# Patient Record
Sex: FEMALE | Race: WHITE | NOT HISPANIC OR LATINO | Employment: OTHER | URBAN - METROPOLITAN AREA
[De-identification: names, ages, dates, MRNs, and addresses within clinical notes are randomized per-mention and may not be internally consistent; named-entity substitution may affect disease eponyms.]

---

## 2017-05-02 RX ORDER — MEDROXYPROGESTERONE ACETATE 2.5 MG/1
TABLET ORAL
Qty: 90 TABLET | Refills: 1 | Status: SHIPPED | OUTPATIENT
Start: 2017-05-02 | End: 2018-05-30 | Stop reason: SDUPTHER

## 2017-10-23 ENCOUNTER — PROCEDURE VISIT (OUTPATIENT)
Dept: OBSTETRICS AND GYNECOLOGY | Facility: CLINIC | Age: 62
End: 2017-10-23

## 2017-10-23 ENCOUNTER — OFFICE VISIT (OUTPATIENT)
Dept: OBSTETRICS AND GYNECOLOGY | Facility: CLINIC | Age: 62
End: 2017-10-23

## 2017-10-23 VITALS
SYSTOLIC BLOOD PRESSURE: 160 MMHG | BODY MASS INDEX: 30.95 KG/M2 | DIASTOLIC BLOOD PRESSURE: 98 MMHG | HEIGHT: 62 IN | WEIGHT: 168.2 LBS

## 2017-10-23 DIAGNOSIS — Z78.0 POSTMENOPAUSAL: ICD-10-CM

## 2017-10-23 DIAGNOSIS — Z78.0 MENOPAUSE: ICD-10-CM

## 2017-10-23 DIAGNOSIS — Z01.419 ENCOUNTER FOR GYNECOLOGICAL EXAMINATION WITHOUT ABNORMAL FINDING: Primary | ICD-10-CM

## 2017-10-23 DIAGNOSIS — M85.80 OSTEOPENIA, UNSPECIFIED LOCATION: ICD-10-CM

## 2017-10-23 DIAGNOSIS — D25.2 SUBSEROUS LEIOMYOMA OF UTERUS: ICD-10-CM

## 2017-10-23 DIAGNOSIS — Z13.820 SCREENING FOR OSTEOPOROSIS: Primary | ICD-10-CM

## 2017-10-23 DIAGNOSIS — D25.1 INTRAMURAL LEIOMYOMA OF UTERUS: Primary | ICD-10-CM

## 2017-10-23 DIAGNOSIS — M85.88 OSTEOPENIA OF OTHER SITE: ICD-10-CM

## 2017-10-23 LAB
DEVELOPER EXPIRATION DATE: NORMAL
DEVELOPER LOT NUMBER: NORMAL
EXPIRATION DATE: NORMAL
FECAL OCCULT BLOOD SCREEN, POC: NEGATIVE
Lab: NORMAL
NEGATIVE CONTROL: NEGATIVE
POSITIVE CONTROL: POSITIVE

## 2017-10-23 PROCEDURE — G0328 FECAL BLOOD SCRN IMMUNOASSAY: HCPCS | Performed by: OBSTETRICS & GYNECOLOGY

## 2017-10-23 PROCEDURE — 99396 PREV VISIT EST AGE 40-64: CPT | Performed by: OBSTETRICS & GYNECOLOGY

## 2017-10-23 PROCEDURE — 77080 DXA BONE DENSITY AXIAL: CPT | Performed by: OBSTETRICS & GYNECOLOGY

## 2017-10-23 PROCEDURE — 76830 TRANSVAGINAL US NON-OB: CPT | Performed by: OBSTETRICS & GYNECOLOGY

## 2017-10-23 NOTE — PROGRESS NOTES
Subjective    Chief Complaint   Patient presents with   • Gynecologic Exam      History of Present Illness    Christiane Smith is a 62 y.o. female who presents for annual exam. Patient had mammogram today, DEXA scan today showed mild osteopenia, an ultrasound today showed same calcified 4.3 cm uterine fibroid as in 2008. Non-Smoker  , colonoscopy 6 years ago and due every 10 years.  Having no bleeding or problems.    Obstetric History:  OB History      Para Term  AB Living    1 1 1       SAB TAB Ectopic Multiple Live Births                 Menstrual History:     No LMP recorded.       Past Medical History:   Diagnosis Date   • GERD (gastroesophageal reflux disease)    • Hyperlipidemia    • Hypertension    • Migraine      Family History   Problem Relation Age of Onset   • Breast cancer Maternal Grandmother        The following portions of the patient's history were reviewed and updated as appropriate: allergies, current medications, past family history, past medical history, past social history, past surgical history and problem list.    Review of Systems   Constitutional: Negative.  Negative for fever and unexpected weight change.   HENT: Negative.    Respiratory: Negative for shortness of breath and wheezing.    Cardiovascular: Negative for chest pain, palpitations and leg swelling.   Gastrointestinal: Negative for abdominal pain, anal bleeding and blood in stool.   Genitourinary: Negative for dysuria, pelvic pain, urgency, vaginal bleeding, vaginal discharge and vaginal pain.   Skin: Negative.    Neurological: Negative.    Hematological: Negative.  Negative for adenopathy.   Psychiatric/Behavioral: Negative.  Negative for dysphoric mood. The patient is not nervous/anxious.             Objective   Physical Exam   Constitutional: She is oriented to person, place, and time. Vital signs are normal. She appears well-developed and well-nourished.   HENT:   Head: Normocephalic.   Neck: Trachea normal. No  "tracheal deviation present. No thyromegaly present.   Cardiovascular: Normal rate, regular rhythm and normal heart sounds.    No murmur heard.  Pulmonary/Chest: Effort normal and breath sounds normal.   Breasts without masses, tenderness or nipple discharge   Abdominal: Soft. Normal appearance. She exhibits no mass. There is no hepatosplenomegaly. There is no tenderness. No hernia.   Genitourinary: Rectum normal, vagina normal and uterus normal. Rectal exam shows guaiac negative stool. Uterus is not enlarged and not tender. Cervix exhibits no motion tenderness. Right adnexum displays no mass and no tenderness. Left adnexum displays no mass and no tenderness. No vaginal discharge found.   Genitourinary Comments: External genitalia normal    Lymphadenopathy:     She has no cervical adenopathy.     She has no axillary adenopathy.   Neurological: She is alert and oriented to person, place, and time.   Skin: Skin is warm and dry. No rash noted.   Psychiatric: She has a normal mood and affect. Her behavior is normal. Cognition and memory are normal.       /98  Ht 62\" (157.5 cm)  Wt 168 lb 3.2 oz (76.3 kg)  BMI 30.76 kg/m2    Assessment/Plan   Christiane was seen today for gynecologic exam.    Diagnoses and all orders for this visit:    Encounter for gynecological examination without abnormal finding  -     IGP,rfx Aptima HPV All Pth - ThinPrep Vial, Cervix  -     POC Occult Blood Stool    Menopause    Subserous leiomyoma of uterus    Osteopenia, unspecified location    Other orders  -      DEXA SCAN      Mammogram. RTO 1 year     Counseled about increasing calcium with vitamin D 2 twice a day.  Also will try to stop hormone replacement but call if she needs to go back on it.         "

## 2017-10-25 LAB
CONV .: NORMAL
CYTOLOGIST CVX/VAG CYTO: NORMAL
CYTOLOGY CVX/VAG DOC THIN PREP: NORMAL
DX ICD CODE: NORMAL
HIV 1 & 2 AB SER-IMP: NORMAL
OTHER STN SPEC: NORMAL
PATH REPORT.FINAL DX SPEC: NORMAL
STAT OF ADQ CVX/VAG CYTO-IMP: NORMAL

## 2017-11-09 ENCOUNTER — HOSPITAL ENCOUNTER (OUTPATIENT)
Dept: INFUSION THERAPY | Facility: HOSPITAL | Age: 62
Discharge: HOME | End: 2017-11-09
Admitting: PODIATRIST

## 2017-11-09 VITALS
BODY MASS INDEX: 30.91 KG/M2 | DIASTOLIC BLOOD PRESSURE: 91 MMHG | HEART RATE: 98 BPM | SYSTOLIC BLOOD PRESSURE: 158 MMHG | HEIGHT: 62 IN | OXYGEN SATURATION: 98 % | WEIGHT: 168 LBS | TEMPERATURE: 97.6 F | RESPIRATION RATE: 16 BRPM

## 2017-11-09 DIAGNOSIS — M72.2 PLANTAR FASCIITIS: Primary | ICD-10-CM

## 2017-11-09 PROCEDURE — 99195 PHLEBOTOMY: CPT

## 2017-11-09 NOTE — PROGRESS NOTES
NURSING PROGRESS NOTE: Patient ambulated to [facility] at 1000 for scheduled phlebotomy procedure.  History and medications reviewed with patient.  18g angio placed in RT. AC and 30ml blood removed.  3ml of Biomet PRP solution added to blood.  Patient tolerated procedure well.  Patient given blood sample collected to take with her to Dr. Rios's office.  Patient and her family left [facility] at 1025 to go to Dr. Rios's office.  No c/o.  OSBALDO Liang

## 2017-12-06 RX ORDER — ESTRADIOL 0.5 MG/1
TABLET ORAL
Qty: 90 TABLET | Refills: 2 | Status: SHIPPED | OUTPATIENT
Start: 2017-12-06 | End: 2021-06-09 | Stop reason: SDUPTHER

## 2018-06-04 RX ORDER — MEDROXYPROGESTERONE ACETATE 2.5 MG/1
TABLET ORAL
Qty: 90 TABLET | Refills: 2 | Status: SHIPPED | OUTPATIENT
Start: 2018-06-04 | End: 2019-03-11 | Stop reason: SDUPTHER

## 2018-08-01 ENCOUNTER — TRANSCRIBE ORDERS (OUTPATIENT)
Dept: ADMINISTRATIVE | Facility: HOSPITAL | Age: 63
End: 2018-08-01

## 2018-08-01 ENCOUNTER — LAB (OUTPATIENT)
Dept: LAB | Facility: HOSPITAL | Age: 63
End: 2018-08-01

## 2018-08-01 DIAGNOSIS — L13.9 BULLOUS SKIN DISEASE: Primary | ICD-10-CM

## 2018-08-01 DIAGNOSIS — L13.9 BULLOUS SKIN DISEASE: ICD-10-CM

## 2018-08-01 LAB — ERYTHROCYTE [SEDIMENTATION RATE] IN BLOOD: 6 MM/HR (ref 0–20)

## 2018-08-01 PROCEDURE — 86038 ANTINUCLEAR ANTIBODIES: CPT

## 2018-08-01 PROCEDURE — 86255 FLUORESCENT ANTIBODY SCREEN: CPT

## 2018-08-01 PROCEDURE — 85652 RBC SED RATE AUTOMATED: CPT

## 2018-08-01 PROCEDURE — 86256 FLUORESCENT ANTIBODY TITER: CPT

## 2018-08-01 PROCEDURE — 36415 COLL VENOUS BLD VENIPUNCTURE: CPT

## 2018-08-02 LAB — ANA SER QL IA: NEGATIVE

## 2018-08-18 LAB
BMZ BP230 AB SERPL-ACNC: 1.2 U/ML
Lab: <1 U/ML

## 2018-10-08 DIAGNOSIS — Z12.31 VISIT FOR SCREENING MAMMOGRAM: Primary | ICD-10-CM

## 2018-10-18 ENCOUNTER — OFFICE VISIT (OUTPATIENT)
Dept: OBSTETRICS AND GYNECOLOGY | Facility: CLINIC | Age: 63
End: 2018-10-18

## 2018-10-18 VITALS
WEIGHT: 168 LBS | HEIGHT: 64 IN | SYSTOLIC BLOOD PRESSURE: 126 MMHG | BODY MASS INDEX: 28.68 KG/M2 | DIASTOLIC BLOOD PRESSURE: 64 MMHG

## 2018-10-18 DIAGNOSIS — Z78.0 MENOPAUSE: ICD-10-CM

## 2018-10-18 DIAGNOSIS — M85.80 OSTEOPENIA, UNSPECIFIED LOCATION: ICD-10-CM

## 2018-10-18 DIAGNOSIS — Z01.419 ENCOUNTER FOR GYNECOLOGICAL EXAMINATION WITHOUT ABNORMAL FINDING: Primary | ICD-10-CM

## 2018-10-18 PROCEDURE — 99397 PER PM REEVAL EST PAT 65+ YR: CPT | Performed by: OBSTETRICS & GYNECOLOGY

## 2018-10-18 PROCEDURE — G0328 FECAL BLOOD SCRN IMMUNOASSAY: HCPCS | Performed by: OBSTETRICS & GYNECOLOGY

## 2018-10-18 NOTE — PROGRESS NOTES
Subjective    Chief Complaint   Patient presents with   • Gynecologic Exam     AE, PM NO BLEEDING, NO PROBLEMS + HRT'S      History of Present Illness    Christiane Smith is a 63 y.o. female who presents for annual exam.  Nonsmoker.  DEXA scan 2017 mild osteopenia.  Colonoscopy due in 3 years.  Postmenopausal on hormone replacement with estradiol 0.5 mg and Provera 2.5 mg daily.  Patient will try to wean off hormones this month but if not able to will call.  Mammogram scheduled next week.    Obstetric History:  OB History      Para Term  AB Living    1 1 1          SAB TAB Ectopic Molar Multiple Live Births                        Menstrual History:     No LMP recorded.       Past Medical History:   Diagnosis Date   • GERD (gastroesophageal reflux disease)    • Hyperlipidemia    • Hypertension    • Migraine    • Plantar fasciitis      Family History   Problem Relation Age of Onset   • Breast cancer Maternal Grandmother      History   Smoking Status   • Never Smoker   Smokeless Tobacco   • Never Used     Counseling given: Not Answered      The following portions of the patient's history were reviewed and updated as appropriate: allergies, current medications, past family history, past medical history, past social history, past surgical history and problem list.    Review of Systems   Constitutional: Negative.  Negative for fever and unexpected weight change.   HENT: Negative.    Respiratory: Negative for shortness of breath and wheezing.    Cardiovascular: Negative for chest pain, palpitations and leg swelling.   Gastrointestinal: Negative for abdominal pain, anal bleeding and blood in stool.   Genitourinary: Negative for dysuria, pelvic pain, urgency, vaginal bleeding, vaginal discharge and vaginal pain.   Skin: Negative.    Neurological: Negative.    Hematological: Negative.  Negative for adenopathy.   Psychiatric/Behavioral: Negative.  Negative for dysphoric mood. The patient is not  "nervous/anxious.             Objective   Physical Exam   Constitutional: She is oriented to person, place, and time. Vital signs are normal. She appears well-developed and well-nourished.   HENT:   Head: Normocephalic.   Neck: Trachea normal. No tracheal deviation present. No thyromegaly present.   Cardiovascular: Normal rate, regular rhythm and normal heart sounds.    No murmur heard.  Pulmonary/Chest: Effort normal and breath sounds normal.   Breasts without masses, tenderness or nipple discharge   Abdominal: Soft. Normal appearance. She exhibits no mass. There is no hepatosplenomegaly. There is no tenderness. No hernia.   Genitourinary: Rectum normal, vagina normal and uterus normal. Rectal exam shows guaiac negative stool. Uterus is not enlarged and not tender. Cervix exhibits no motion tenderness. Right adnexum displays no mass and no tenderness. Left adnexum displays no mass and no tenderness. No vaginal discharge found.   Genitourinary Comments: External genitalia normal    Lymphadenopathy:     She has no cervical adenopathy.     She has no axillary adenopathy.   Neurological: She is alert and oriented to person, place, and time.   Skin: Skin is warm and dry. No rash noted.   Psychiatric: She has a normal mood and affect. Her behavior is normal. Cognition and memory are normal.       /64   Ht 162.6 cm (64\")   Wt 76.2 kg (168 lb)   BMI 28.84 kg/m²     Assessment/Plan   Christiane was seen today for gynecologic exam.    Diagnoses and all orders for this visit:    Encounter for gynecological examination without abnormal finding  -     IGP,rfx Aptima HPV All Pth  -     POC Occult Blood Stool    Osteopenia, unspecified location    Menopause        Mammogram. RTO 1 year     Counseled about continuing calcium with vitamin D twice daily.  Also discussed weaning off hormone replacement.         "

## 2018-10-23 ENCOUNTER — OFFICE VISIT (OUTPATIENT)
Dept: INFECTIOUS DISEASES | Facility: CLINIC | Age: 63
End: 2018-10-23

## 2018-10-23 ENCOUNTER — APPOINTMENT (OUTPATIENT)
Dept: WOMENS IMAGING | Facility: HOSPITAL | Age: 63
End: 2018-10-23

## 2018-10-23 VITALS
SYSTOLIC BLOOD PRESSURE: 160 MMHG | HEART RATE: 85 BPM | HEIGHT: 64 IN | BODY MASS INDEX: 28.65 KG/M2 | TEMPERATURE: 97.9 F | DIASTOLIC BLOOD PRESSURE: 84 MMHG | RESPIRATION RATE: 12 BRPM | WEIGHT: 167.8 LBS

## 2018-10-23 DIAGNOSIS — L13.8 PSEUDOPORPHYRIA: Primary | ICD-10-CM

## 2018-10-23 PROCEDURE — 77063 BREAST TOMOSYNTHESIS BI: CPT | Performed by: RADIOLOGY

## 2018-10-23 PROCEDURE — 77067 SCR MAMMO BI INCL CAD: CPT | Performed by: RADIOLOGY

## 2018-10-23 PROCEDURE — 99204 OFFICE O/P NEW MOD 45 MIN: CPT | Performed by: INTERNAL MEDICINE

## 2018-10-23 NOTE — PROGRESS NOTES
"cc: This a very nice 63-year-old we are asked to provide evaluation opinion regarding blisters.    Patient reports that she is her usual state of health until November 2017 which developed a single skin blister.  That healed okay but she's had recurrent blisters on her toes since summer of 2018.  She reports that they tend to occur on multiple toes without known inciting events and drain watery serous fluid.  There is also spontaneously after several weeks.  She's had biopsy ×2 from dermatology which she reported to me are negative.  I did obtain those records and review them: Bullous pemphigoid antibodies were negative as well as AMMON.  Sedimentation rate was normal.  She was seen by dermatology and diagnosed with pseudo-porphyria.  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  I'm missing some of the immunotherapy dermatology report that looks like she had negative IgG and IgA basement membrane zone antibodies.  In any event, it was theorized by dermatology that she may have had this blistering reaction as a consequence of NSAID use specifically with oxaprozin.    Patient reports she's been on oxaprozin for about one year.  She continues to take it for arthritis of the back.    She has had a couple episodes of infection in the toe one on the right great toe and the right third toe.  Both are quiescent.    Past medical history: Hypertension, hyperlipidemia, seasonal allergies, degenerative disc disease  No known drug allergies  No family history of infectious diseases  Social history: .  Retired.  Nonsmoker.    Review of Systems: All other reviewed and negative except as per HPI    Blood pressure 160/84, pulse 85, temperature 97.9 °F (36.6 °C), resp. rate 12, height 162.6 cm (64\"), weight 76.1 kg (167 lb 12.8 oz).  GENERAL: " Awake and alert, in no acute distress.   HEENT: Oropharynx is clear. Hearing is grossly normal.   EYES: PERRL. No conjunctival injection. No lid lag.   LYMPHATICS: No lymphadenopathy of the neck or inguinal regions.   HEART: Regular rate and rhythm. No peripheral edema.   LUNGS: Clear to auscultation anteriorly with normal respiratory effort.   ABDOMEN: Soft, nontender, nondistended. No appreciable organomegaly.   SKIN: Warm and dry without cutaneous eruptions   PSYCHIATRIC: Appropriate mood, affect, insight, and judgment.       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  It was theorized this was secondary to oxaprozin use.  I told her that she did not have a primary infectious explanation for the blistering disease.  She's had superinfection of the toes due to the underlying primary dermatologic process.  It may be reasonable to discontinue the oxaprozin in favor of an alternative agent.  I said I would defer this to her primary care providers.  No active infection and treatment of the underlying disease should help prevent continued infections.  She is going to follow back up with dermatology and reach back out and to see if they have additional recommendations.  We will be happy to reevaluate anytime she needs.

## 2018-11-06 DIAGNOSIS — Z12.31 VISIT FOR SCREENING MAMMOGRAM: ICD-10-CM

## 2019-03-12 RX ORDER — MEDROXYPROGESTERONE ACETATE 2.5 MG/1
TABLET ORAL
Qty: 90 TABLET | Refills: 2 | Status: SHIPPED | OUTPATIENT
Start: 2019-03-12 | End: 2020-03-04

## 2019-05-08 ENCOUNTER — APPOINTMENT (OUTPATIENT)
Dept: CARDIOLOGY | Facility: HOSPITAL | Age: 64
End: 2019-05-08

## 2019-05-08 ENCOUNTER — APPOINTMENT (OUTPATIENT)
Dept: GENERAL RADIOLOGY | Facility: HOSPITAL | Age: 64
End: 2019-05-08

## 2019-05-08 ENCOUNTER — HOSPITAL ENCOUNTER (OUTPATIENT)
Facility: HOSPITAL | Age: 64
Setting detail: OBSERVATION
Discharge: HOME | End: 2019-05-10
Attending: EMERGENCY MEDICINE | Admitting: INTERNAL MEDICINE

## 2019-05-08 DIAGNOSIS — R07.9 CHEST PAIN, UNSPECIFIED TYPE: Primary | ICD-10-CM

## 2019-05-08 LAB
ALBUMIN SERPL-MCNC: 4.5 G/DL (ref 3.5–5.2)
ALBUMIN/GLOB SERPL: 1.6 G/DL
ALP SERPL-CCNC: 61 U/L (ref 39–117)
ALT SERPL W P-5'-P-CCNC: 18 U/L (ref 1–33)
ANION GAP SERPL CALCULATED.3IONS-SCNC: 16.9 MMOL/L
AST SERPL-CCNC: 19 U/L (ref 1–32)
BASOPHILS # BLD AUTO: 0.05 10*3/MM3 (ref 0–0.2)
BASOPHILS NFR BLD AUTO: 0.5 % (ref 0–1.5)
BH CV ECHO MEAS - ACS: 1.8 CM
BH CV ECHO MEAS - AO MAX PG (FULL): 0.84 MMHG
BH CV ECHO MEAS - AO MAX PG: 7.4 MMHG
BH CV ECHO MEAS - AO MEAN PG (FULL): 1 MMHG
BH CV ECHO MEAS - AO MEAN PG: 4 MMHG
BH CV ECHO MEAS - AO ROOT AREA (BSA CORRECTED): 1.5
BH CV ECHO MEAS - AO ROOT AREA: 6.2 CM^2
BH CV ECHO MEAS - AO ROOT DIAM: 2.8 CM
BH CV ECHO MEAS - AO V2 MAX: 136 CM/SEC
BH CV ECHO MEAS - AO V2 MEAN: 89 CM/SEC
BH CV ECHO MEAS - AO V2 VTI: 24.3 CM
BH CV ECHO MEAS - AVA(I,A): 3.2 CM^2
BH CV ECHO MEAS - AVA(I,D): 3.2 CM^2
BH CV ECHO MEAS - AVA(V,A): 3.3 CM^2
BH CV ECHO MEAS - AVA(V,D): 3.3 CM^2
BH CV ECHO MEAS - BSA(HAYCOCK): 1.9 M^2
BH CV ECHO MEAS - BSA: 1.8 M^2
BH CV ECHO MEAS - BZI_BMI: 29.4 KILOGRAMS/M^2
BH CV ECHO MEAS - BZI_METRIC_HEIGHT: 162.6 CM
BH CV ECHO MEAS - BZI_METRIC_WEIGHT: 77.6 KG
BH CV ECHO MEAS - EDV(CUBED): 54.4 ML
BH CV ECHO MEAS - EDV(MOD-SP2): 45.1 ML
BH CV ECHO MEAS - EDV(MOD-SP4): 64.7 ML
BH CV ECHO MEAS - EDV(TEICH): 61.6 ML
BH CV ECHO MEAS - EF(CUBED): 65.4 %
BH CV ECHO MEAS - EF(MOD-BP): 60 %
BH CV ECHO MEAS - EF(MOD-SP2): 60.1 %
BH CV ECHO MEAS - EF(MOD-SP4): 58.3 %
BH CV ECHO MEAS - EF(TEICH): 57.7 %
BH CV ECHO MEAS - ESV(CUBED): 18.8 ML
BH CV ECHO MEAS - ESV(MOD-SP2): 18 ML
BH CV ECHO MEAS - ESV(MOD-SP4): 27 ML
BH CV ECHO MEAS - ESV(TEICH): 26 ML
BH CV ECHO MEAS - FS: 29.8 %
BH CV ECHO MEAS - IVS/LVPW: 0.91
BH CV ECHO MEAS - IVSD: 1 CM
BH CV ECHO MEAS - LA DIMENSION: 3.2 CM
BH CV ECHO MEAS - LA/AO: 1.1
BH CV ECHO MEAS - LAT PEAK E' VEL: 7 CM/SEC
BH CV ECHO MEAS - LV DIASTOLIC VOL/BSA (35-75): 35.4 ML/M^2
BH CV ECHO MEAS - LV MASS(C)D: 125 GRAMS
BH CV ECHO MEAS - LV MASS(C)DI: 68.3 GRAMS/M^2
BH CV ECHO MEAS - LV MAX PG: 6.6 MMHG
BH CV ECHO MEAS - LV MEAN PG: 3 MMHG
BH CV ECHO MEAS - LV SYSTOLIC VOL/BSA (12-30): 14.8 ML/M^2
BH CV ECHO MEAS - LV V1 MAX: 128 CM/SEC
BH CV ECHO MEAS - LV V1 MEAN: 79.4 CM/SEC
BH CV ECHO MEAS - LV V1 VTI: 22.3 CM
BH CV ECHO MEAS - LVIDD: 3.8 CM
BH CV ECHO MEAS - LVIDS: 2.7 CM
BH CV ECHO MEAS - LVLD AP2: 5.9 CM
BH CV ECHO MEAS - LVLD AP4: 6.6 CM
BH CV ECHO MEAS - LVLS AP2: 4.3 CM
BH CV ECHO MEAS - LVLS AP4: 6 CM
BH CV ECHO MEAS - LVOT AREA (M): 3.5 CM^2
BH CV ECHO MEAS - LVOT AREA: 3.5 CM^2
BH CV ECHO MEAS - LVOT DIAM: 2.1 CM
BH CV ECHO MEAS - LVPWD: 1.1 CM
BH CV ECHO MEAS - MED PEAK E' VEL: 9 CM/SEC
BH CV ECHO MEAS - MV A DUR: 0.11 SEC
BH CV ECHO MEAS - MV A MAX VEL: 98 CM/SEC
BH CV ECHO MEAS - MV DEC SLOPE: 654 CM/SEC^2
BH CV ECHO MEAS - MV DEC TIME: 0.11 SEC
BH CV ECHO MEAS - MV E MAX VEL: 80 CM/SEC
BH CV ECHO MEAS - MV E/A: 0.82
BH CV ECHO MEAS - MV MAX PG: 5.6 MMHG
BH CV ECHO MEAS - MV MEAN PG: 3 MMHG
BH CV ECHO MEAS - MV P1/2T MAX VEL: 97.7 CM/SEC
BH CV ECHO MEAS - MV P1/2T: 43.8 MSEC
BH CV ECHO MEAS - MV V2 MAX: 118 CM/SEC
BH CV ECHO MEAS - MV V2 MEAN: 76.6 CM/SEC
BH CV ECHO MEAS - MV V2 VTI: 21.8 CM
BH CV ECHO MEAS - MVA P1/2T LCG: 2.3 CM^2
BH CV ECHO MEAS - MVA(P1/2T): 5 CM^2
BH CV ECHO MEAS - MVA(VTI): 3.5 CM^2
BH CV ECHO MEAS - PA ACC TIME: 0.07 SEC
BH CV ECHO MEAS - PA MAX PG (FULL): 1.5 MMHG
BH CV ECHO MEAS - PA MAX PG: 4.2 MMHG
BH CV ECHO MEAS - PA PR(ACCEL): 47.5 MMHG
BH CV ECHO MEAS - PA V2 MAX: 102 CM/SEC
BH CV ECHO MEAS - PULM A REVS DUR: 0.12 SEC
BH CV ECHO MEAS - PULM A REVS VEL: 59.7 CM/SEC
BH CV ECHO MEAS - PULM DIAS VEL: 39.8 CM/SEC
BH CV ECHO MEAS - PULM S/D: 1.5
BH CV ECHO MEAS - PULM SYS VEL: 61.6 CM/SEC
BH CV ECHO MEAS - PVA(V,A): 2.5 CM^2
BH CV ECHO MEAS - PVA(V,D): 2.5 CM^2
BH CV ECHO MEAS - QP/QS: 0.5
BH CV ECHO MEAS - RV MAX PG: 2.7 MMHG
BH CV ECHO MEAS - RV MEAN PG: 1 MMHG
BH CV ECHO MEAS - RV V1 MAX: 82 CM/SEC
BH CV ECHO MEAS - RV V1 MEAN: 46.5 CM/SEC
BH CV ECHO MEAS - RV V1 VTI: 12.2 CM
BH CV ECHO MEAS - RVOT AREA: 3.1 CM^2
BH CV ECHO MEAS - RVOT DIAM: 2 CM
BH CV ECHO MEAS - SI(AO): 81.8 ML/M^2
BH CV ECHO MEAS - SI(CUBED): 19.5 ML/M^2
BH CV ECHO MEAS - SI(LVOT): 42.2 ML/M^2
BH CV ECHO MEAS - SI(MOD-SP2): 14.8 ML/M^2
BH CV ECHO MEAS - SI(MOD-SP4): 20.6 ML/M^2
BH CV ECHO MEAS - SI(TEICH): 19.4 ML/M^2
BH CV ECHO MEAS - SV(AO): 149.6 ML
BH CV ECHO MEAS - SV(CUBED): 35.6 ML
BH CV ECHO MEAS - SV(LVOT): 77.2 ML
BH CV ECHO MEAS - SV(MOD-SP2): 27.1 ML
BH CV ECHO MEAS - SV(MOD-SP4): 37.7 ML
BH CV ECHO MEAS - SV(RVOT): 38.3 ML
BH CV ECHO MEAS - SV(TEICH): 35.5 ML
BH CV ECHO MEAS - TAPSE (>1.6): 1.9 CM2
BH CV ECHO MEASUREMENTS AVERAGE E/E' RATIO: 10
BH CV STRESS BP STAGE 1: NORMAL
BH CV STRESS BP STAGE 2: NORMAL
BH CV STRESS DURATION MIN STAGE 1: 3
BH CV STRESS DURATION SEC STAGE 1: 0
BH CV STRESS DURATION SEC STAGE 2: 21
BH CV STRESS GRADE STAGE 1: 10
BH CV STRESS GRADE STAGE 2: 12
BH CV STRESS HR STAGE 1: 133
BH CV STRESS HR STAGE 2: 140
BH CV STRESS METS STAGE 1: 5
BH CV STRESS METS STAGE 2: 7.5
BH CV STRESS PROTOCOL 1: NORMAL
BH CV STRESS RECOVERY BP: NORMAL MMHG
BH CV STRESS RECOVERY HR: 119 BPM
BH CV STRESS SPEED STAGE 1: 1.7
BH CV STRESS SPEED STAGE 2: 2.5
BH CV STRESS STAGE 1: 1
BH CV STRESS STAGE 2: 2
BH CV VAS BP RIGHT ARM: NORMAL MMHG
BH CV XLRA - RV BASE: 2.3 CM
BH CV XLRA - TDI S': 14 CM/SEC
BILIRUB SERPL-MCNC: 0.6 MG/DL (ref 0.2–1.2)
BUN BLD-MCNC: 31 MG/DL (ref 8–23)
BUN/CREAT SERPL: 41.3 (ref 7–25)
CALCIUM SPEC-SCNC: 9.5 MG/DL (ref 8.6–10.5)
CHLORIDE SERPL-SCNC: 102 MMOL/L (ref 98–107)
CO2 SERPL-SCNC: 23.1 MMOL/L (ref 22–29)
CREAT BLD-MCNC: 0.75 MG/DL (ref 0.57–1)
D DIMER PPP FEU-MCNC: 0.28 MCGFEU/ML (ref 0–0.46)
DEPRECATED RDW RBC AUTO: 44.2 FL (ref 37–54)
EOSINOPHIL # BLD AUTO: 0.17 10*3/MM3 (ref 0–0.4)
EOSINOPHIL NFR BLD AUTO: 1.6 % (ref 0.3–6.2)
ERYTHROCYTE [DISTWIDTH] IN BLOOD BY AUTOMATED COUNT: 13.1 % (ref 12.3–15.4)
GFR SERPL CREATININE-BSD FRML MDRD: 78 ML/MIN/1.73
GLOBULIN UR ELPH-MCNC: 2.9 GM/DL
GLUCOSE BLD-MCNC: 96 MG/DL (ref 65–99)
HBA1C MFR BLD: 5 % (ref 4.8–5.6)
HCT VFR BLD AUTO: 46.3 % (ref 34–46.6)
HGB BLD-MCNC: 15.4 G/DL (ref 12–15.9)
HOLD SPECIMEN: NORMAL
HOLD SPECIMEN: NORMAL
IMM GRANULOCYTES # BLD AUTO: 0.03 10*3/MM3 (ref 0–0.05)
IMM GRANULOCYTES NFR BLD AUTO: 0.3 % (ref 0–0.5)
LEFT ATRIUM VOLUME INDEX: 16 ML/M2
LYMPHOCYTES # BLD AUTO: 0.63 10*3/MM3 (ref 0.7–3.1)
LYMPHOCYTES NFR BLD AUTO: 6 % (ref 19.6–45.3)
MAXIMAL PREDICTED HEART RATE: 155 BPM
MAXIMAL PREDICTED HEART RATE: 155 BPM
MCH RBC QN AUTO: 30.4 PG (ref 26.6–33)
MCHC RBC AUTO-ENTMCNC: 33.3 G/DL (ref 31.5–35.7)
MCV RBC AUTO: 91.5 FL (ref 79–97)
MONOCYTES # BLD AUTO: 0.57 10*3/MM3 (ref 0.1–0.9)
MONOCYTES NFR BLD AUTO: 5.4 % (ref 5–12)
NEUTROPHILS # BLD AUTO: 9.13 10*3/MM3 (ref 1.7–7)
NEUTROPHILS NFR BLD AUTO: 86.2 % (ref 42.7–76)
NRBC BLD AUTO-RTO: 0 /100 WBC (ref 0–0.2)
PERCENT MAX PREDICTED HR: 90.97 %
PLATELET # BLD AUTO: 213 10*3/MM3 (ref 140–450)
PMV BLD AUTO: 10.1 FL (ref 6–12)
POTASSIUM BLD-SCNC: 3.7 MMOL/L (ref 3.5–5.2)
PROT SERPL-MCNC: 7.4 G/DL (ref 6–8.5)
RBC # BLD AUTO: 5.06 10*6/MM3 (ref 3.77–5.28)
SODIUM BLD-SCNC: 142 MMOL/L (ref 136–145)
STRESS BASELINE BP: NORMAL MMHG
STRESS BASELINE HR: 109 BPM
STRESS O2 SAT REST: 99 %
STRESS PERCENT HR: 107 %
STRESS POST ESTIMATED WORKLOAD: 5.1 METS
STRESS POST EXERCISE DUR MIN: 3 MIN
STRESS POST EXERCISE DUR SEC: 21 SEC
STRESS POST O2 SAT PEAK: 99 %
STRESS POST PEAK BP: NORMAL MMHG
STRESS POST PEAK HR: 141 BPM
STRESS TARGET HR: 132 BPM
STRESS TARGET HR: 132 BPM
TROPONIN T SERPL-MCNC: <0.01 NG/ML (ref 0–0.03)
TSH SERPL DL<=0.05 MIU/L-ACNC: 1.49 MIU/ML (ref 0.27–4.2)
WBC NRBC COR # BLD: 10.58 10*3/MM3 (ref 3.4–10.8)
WHOLE BLOOD HOLD SPECIMEN: NORMAL
WHOLE BLOOD HOLD SPECIMEN: NORMAL

## 2019-05-08 PROCEDURE — 83735 ASSAY OF MAGNESIUM: CPT | Performed by: INTERNAL MEDICINE

## 2019-05-08 PROCEDURE — 84484 ASSAY OF TROPONIN QUANT: CPT | Performed by: INTERNAL MEDICINE

## 2019-05-08 PROCEDURE — 96361 HYDRATE IV INFUSION ADD-ON: CPT

## 2019-05-08 PROCEDURE — 99204 OFFICE O/P NEW MOD 45 MIN: CPT | Performed by: NURSE PRACTITIONER

## 2019-05-08 PROCEDURE — G0378 HOSPITAL OBSERVATION PER HR: HCPCS

## 2019-05-08 PROCEDURE — 99285 EMERGENCY DEPT VISIT HI MDM: CPT

## 2019-05-08 PROCEDURE — 25010000002 ONDANSETRON PER 1 MG: Performed by: EMERGENCY MEDICINE

## 2019-05-08 PROCEDURE — 96372 THER/PROPH/DIAG INJ SC/IM: CPT

## 2019-05-08 PROCEDURE — 96374 THER/PROPH/DIAG INJ IV PUSH: CPT

## 2019-05-08 PROCEDURE — 93306 TTE W/DOPPLER COMPLETE: CPT

## 2019-05-08 PROCEDURE — 93010 ELECTROCARDIOGRAM REPORT: CPT | Performed by: INTERNAL MEDICINE

## 2019-05-08 PROCEDURE — 25010000002 ENOXAPARIN PER 10 MG: Performed by: INTERNAL MEDICINE

## 2019-05-08 PROCEDURE — 93005 ELECTROCARDIOGRAM TRACING: CPT | Performed by: EMERGENCY MEDICINE

## 2019-05-08 PROCEDURE — 93306 TTE W/DOPPLER COMPLETE: CPT | Performed by: INTERNAL MEDICINE

## 2019-05-08 PROCEDURE — 93017 CV STRESS TEST TRACING ONLY: CPT

## 2019-05-08 PROCEDURE — 93005 ELECTROCARDIOGRAM TRACING: CPT

## 2019-05-08 PROCEDURE — 85025 COMPLETE CBC W/AUTO DIFF WBC: CPT | Performed by: EMERGENCY MEDICINE

## 2019-05-08 PROCEDURE — 93016 CV STRESS TEST SUPVJ ONLY: CPT | Performed by: INTERNAL MEDICINE

## 2019-05-08 PROCEDURE — 83036 HEMOGLOBIN GLYCOSYLATED A1C: CPT | Performed by: INTERNAL MEDICINE

## 2019-05-08 PROCEDURE — 80053 COMPREHEN METABOLIC PANEL: CPT | Performed by: EMERGENCY MEDICINE

## 2019-05-08 PROCEDURE — 71046 X-RAY EXAM CHEST 2 VIEWS: CPT

## 2019-05-08 PROCEDURE — 99220 PR INITIAL OBSERVATION CARE/DAY 70 MINUTES: CPT | Performed by: INTERNAL MEDICINE

## 2019-05-08 PROCEDURE — 93018 CV STRESS TEST I&R ONLY: CPT | Performed by: INTERNAL MEDICINE

## 2019-05-08 PROCEDURE — 85379 FIBRIN DEGRADATION QUANT: CPT | Performed by: EMERGENCY MEDICINE

## 2019-05-08 PROCEDURE — 84443 ASSAY THYROID STIM HORMONE: CPT | Performed by: INTERNAL MEDICINE

## 2019-05-08 PROCEDURE — 93005 ELECTROCARDIOGRAM TRACING: CPT | Performed by: INTERNAL MEDICINE

## 2019-05-08 PROCEDURE — 99284 EMERGENCY DEPT VISIT MOD MDM: CPT | Performed by: EMERGENCY MEDICINE

## 2019-05-08 PROCEDURE — 84484 ASSAY OF TROPONIN QUANT: CPT | Performed by: EMERGENCY MEDICINE

## 2019-05-08 RX ORDER — ACETAMINOPHEN 325 MG/1
650 TABLET ORAL EVERY 4 HOURS PRN
Status: DISCONTINUED | OUTPATIENT
Start: 2019-05-08 | End: 2019-05-10 | Stop reason: HOSPADM

## 2019-05-08 RX ORDER — SODIUM CHLORIDE 0.9 % (FLUSH) 0.9 %
3 SYRINGE (ML) INJECTION EVERY 12 HOURS SCHEDULED
Status: DISCONTINUED | OUTPATIENT
Start: 2019-05-08 | End: 2019-05-10 | Stop reason: HOSPADM

## 2019-05-08 RX ORDER — SODIUM CHLORIDE 0.9 % (FLUSH) 0.9 %
3-10 SYRINGE (ML) INJECTION AS NEEDED
Status: DISCONTINUED | OUTPATIENT
Start: 2019-05-08 | End: 2019-05-10 | Stop reason: HOSPADM

## 2019-05-08 RX ORDER — ONDANSETRON 2 MG/ML
4 INJECTION INTRAMUSCULAR; INTRAVENOUS EVERY 6 HOURS PRN
Status: DISCONTINUED | OUTPATIENT
Start: 2019-05-08 | End: 2019-05-10 | Stop reason: HOSPADM

## 2019-05-08 RX ORDER — SODIUM CHLORIDE 0.9 % (FLUSH) 0.9 %
10 SYRINGE (ML) INJECTION AS NEEDED
Status: DISCONTINUED | OUTPATIENT
Start: 2019-05-08 | End: 2019-05-10 | Stop reason: HOSPADM

## 2019-05-08 RX ORDER — SODIUM CHLORIDE 9 MG/ML
150 INJECTION, SOLUTION INTRAVENOUS CONTINUOUS
Status: DISCONTINUED | OUTPATIENT
Start: 2019-05-09 | End: 2019-05-10 | Stop reason: HOSPADM

## 2019-05-08 RX ORDER — ONDANSETRON 4 MG/1
4 TABLET, FILM COATED ORAL EVERY 6 HOURS PRN
Status: DISCONTINUED | OUTPATIENT
Start: 2019-05-08 | End: 2019-05-10 | Stop reason: HOSPADM

## 2019-05-08 RX ORDER — SUMATRIPTAN 25 MG/1
50 TABLET, FILM COATED ORAL
Status: DISCONTINUED | OUTPATIENT
Start: 2019-05-08 | End: 2019-05-08

## 2019-05-08 RX ORDER — NITROGLYCERIN 0.4 MG/1
0.4 TABLET SUBLINGUAL
Status: DISCONTINUED | OUTPATIENT
Start: 2019-05-08 | End: 2019-05-10 | Stop reason: HOSPADM

## 2019-05-08 RX ORDER — ONDANSETRON 2 MG/ML
4 INJECTION INTRAMUSCULAR; INTRAVENOUS ONCE
Status: COMPLETED | OUTPATIENT
Start: 2019-05-08 | End: 2019-05-08

## 2019-05-08 RX ORDER — ATORVASTATIN CALCIUM 40 MG/1
40 TABLET, FILM COATED ORAL DAILY
Status: DISCONTINUED | OUTPATIENT
Start: 2019-05-08 | End: 2019-05-10 | Stop reason: HOSPADM

## 2019-05-08 RX ORDER — MULTIPLE VITAMINS W/ MINERALS TAB 9MG-400MCG
1 TAB ORAL DAILY
COMMUNITY
End: 2022-05-31

## 2019-05-08 RX ORDER — SODIUM CHLORIDE 9 MG/ML
75 INJECTION, SOLUTION INTRAVENOUS CONTINUOUS
Status: DISCONTINUED | OUTPATIENT
Start: 2019-05-08 | End: 2019-05-08

## 2019-05-08 RX ORDER — ONDANSETRON 2 MG/ML
INJECTION INTRAMUSCULAR; INTRAVENOUS
Status: DISPENSED
Start: 2019-05-08 | End: 2019-05-08

## 2019-05-08 RX ORDER — CHOLECALCIFEROL (VITAMIN D3) 125 MCG
5 CAPSULE ORAL NIGHTLY PRN
Status: DISCONTINUED | OUTPATIENT
Start: 2019-05-08 | End: 2019-05-10 | Stop reason: HOSPADM

## 2019-05-08 RX ORDER — SODIUM CHLORIDE 9 MG/ML
40 INJECTION, SOLUTION INTRAVENOUS AS NEEDED
Status: DISCONTINUED | OUTPATIENT
Start: 2019-05-08 | End: 2019-05-10 | Stop reason: HOSPADM

## 2019-05-08 RX ORDER — ASPIRIN 81 MG/1
81 TABLET ORAL DAILY
Status: DISCONTINUED | OUTPATIENT
Start: 2019-05-09 | End: 2019-05-10 | Stop reason: HOSPADM

## 2019-05-08 RX ORDER — PANTOPRAZOLE SODIUM 40 MG/1
40 TABLET, DELAYED RELEASE ORAL EVERY MORNING
Status: DISCONTINUED | OUTPATIENT
Start: 2019-05-09 | End: 2019-05-10 | Stop reason: HOSPADM

## 2019-05-08 RX ORDER — SODIUM CHLORIDE 9 MG/ML
125 INJECTION, SOLUTION INTRAVENOUS CONTINUOUS
Status: DISCONTINUED | OUTPATIENT
Start: 2019-05-08 | End: 2019-05-08

## 2019-05-08 RX ORDER — SENNA AND DOCUSATE SODIUM 50; 8.6 MG/1; MG/1
2 TABLET, FILM COATED ORAL NIGHTLY PRN
Status: DISCONTINUED | OUTPATIENT
Start: 2019-05-08 | End: 2019-05-10 | Stop reason: HOSPADM

## 2019-05-08 RX ORDER — ASPIRIN 325 MG
325 TABLET ORAL ONCE
Status: COMPLETED | OUTPATIENT
Start: 2019-05-08 | End: 2019-05-08

## 2019-05-08 RX ADMIN — METOPROLOL TARTRATE 12.5 MG: 25 TABLET, FILM COATED ORAL at 21:00

## 2019-05-08 RX ADMIN — SODIUM CHLORIDE 500 ML: 900 INJECTION, SOLUTION INTRAVENOUS at 09:10

## 2019-05-08 RX ADMIN — ENOXAPARIN SODIUM 40 MG: 40 INJECTION SUBCUTANEOUS at 18:27

## 2019-05-08 RX ADMIN — ONDANSETRON 4 MG: 2 INJECTION, SOLUTION INTRAMUSCULAR; INTRAVENOUS at 09:03

## 2019-05-08 RX ADMIN — NITROGLYCERIN 0.4 MG: 0.4 TABLET SUBLINGUAL at 08:45

## 2019-05-08 RX ADMIN — ATORVASTATIN CALCIUM 40 MG: 40 TABLET, FILM COATED ORAL at 18:25

## 2019-05-08 RX ADMIN — ASPIRIN 325 MG: 325 TABLET, COATED ORAL at 08:45

## 2019-05-08 RX ADMIN — SODIUM CHLORIDE, PRESERVATIVE FREE 3 ML: 5 INJECTION INTRAVENOUS at 21:01

## 2019-05-08 RX ADMIN — ACETAMINOPHEN 650 MG: 325 TABLET, FILM COATED ORAL at 18:25

## 2019-05-08 NOTE — CONSULTS
Patient Name: Christiane Smith  :1955  63 y.o.    Date of Admission: 2019  Date of Consultation:  19  Encounter Provider: THAIS Callejas  Place of Service: [facility] CARDIOLOGY  Referring Provider: Daniela Roman MD  Patient Care Team:  Shan Collier MD as PCP - General (Family Medicine)      Chief complaint: chest tightness    History of Present Illness:  63-year-old female presented to the emergency room this morning after waking up with feelings of chest tightness starting at approximately 6 AM.  She states that she tried to just go back to sleep but wound up throwing up approximately 1 hour later.  She did not feel nauseated at the time.  She presented to the ER and was given a sublingual nitro and an aspirin and states she vomited again.  She again denied having any nausea.  She said the tightness was a 7 out of 10.    She continues to have some tightness but now rates it a 4/10.  She denies having any shortness of breath while resting.    She has noticed that she has been getting  short of breath more easily with regular activity.  She is the example of vacuuming or planting flowers.    She takes lisinopril for blood pressure and is on a statin for her cholesterol.  These were prescribed per her primary care provider.  She has no known history of coronary artery disease or valvular disease.    Her father had a valve replacement at 60 she thinks it was his aortic valve but does not know the history.  Her mother had an MI at 74.  Her siblings have no known heart disease.    She denies having a history of GERD but takes an over-the-counter Prilosec every day.  She is denying that the chest tightness she is feeling at this moment is associated with gastric reflux.    She also has a history of migraines but states she has not had one in quite some time.  She is on hormone replacement therapy.  Past Medical History:   Diagnosis Date   • Arthritis     • GERD (gastroesophageal reflux disease)    • Hyperlipidemia    • Hypertension    • Migraine    • Plantar fasciitis        Past Surgical History:   Procedure Laterality Date   • CHOLECYSTECTOMY           Prior to Admission medications    Medication Sig Start Date End Date Taking? Authorizing Provider   atorvastatin (LIPITOR) 10 MG tablet 10 mg Daily. 9/28/16  Yes Melyssa Meyers MD   Calcium-Magnesium-Vitamin D ER (CALCIUM 1200+D3) 600- MG-MG-UNIT tablet sustained-release 24 hour Take 1 tablet by mouth Daily.   Yes Melyssa Meyers MD   estradiol (ESTRACE) 0.5 MG tablet TAKE ONE TABLET BY MOUTH DAILY 12/6/17  Yes Erick Toth MD   lisinopril (PRINIVIL,ZESTRIL) 10 MG tablet 10 mg Daily. 8/10/16  Yes Melyssa Meyers MD   medroxyPROGESTERone (PROVERA) 2.5 MG tablet TAKE 1 TABLET BY MOUTH ONCE A DAY 3/12/19  Yes Erick Toth MD   montelukast (SINGULAIR) 10 MG tablet Take 10 mg by mouth Daily. 9/26/16  Yes Melyssa Meyers MD   Multiple Vitamins-Minerals (MULTIVITAMIN WITH MINERALS) tablet tablet Take 1 tablet by mouth Daily.   Yes Melyssa Meyers MD   omeprazole (PriLOSEC) 40 MG capsule 40 mg Daily. 9/21/16  Yes Melyssa Meyers MD   oxaprozin (DAYPRO) 600 MG tablet Take 600 mg by mouth 2 (Two) Times a Day.   Yes Melyssa Meyers MD   Ped Multivitamins-Fl-Iron (MULTIVITAMIN WITH FLUORIDE/IRON) 0.25-10 MG/ML solution solution Take  by mouth Daily.    Emergency, Nurse Cinthya, RN   SUMAtriptan (IMITREX) 50 MG tablet Take 50 mg by mouth Every 2 (Two) Hours As Needed for migraine. Take one tablet at onset of headache. May repeat dose one time in 2 hours if headache not relieved.    Emergency, Nurse Epic, RN       No Known Allergies    Social History     Socioeconomic History   • Marital status:      Spouse name: Not on file   • Number of children: Not on file   • Years of education: Not on file   • Highest education level: Not on file   Tobacco Use   • Smoking status:  "Never Smoker   • Smokeless tobacco: Never Used   Substance and Sexual Activity   • Alcohol use: No   • Drug use: No   • Sexual activity: Defer       Family History   Problem Relation Age of Onset   • Breast cancer Maternal Grandmother        REVIEW OF SYSTEMS:   All systems reviewed.  Pertinent positives identified in HPI.  All other systems are negative.      Objective:     Vitals:    05/08/19 0932 05/08/19 1002 05/08/19 1032 05/08/19 1100   BP: 124/65 117/65 (!) 114/104 137/65   BP Location:    Left arm   Patient Position:    Lying   Pulse: 86 96 91 91   Resp:    16   Temp:    97.9 °F (36.6 °C)   TempSrc:    Oral   SpO2: 97% 96% 97% 98%   Weight:    77.9 kg (171 lb 12.8 oz)   Height:    162.6 cm (64\")     Body mass index is 29.49 kg/m².    General Appearance:    Alert, cooperative, in no acute distress   Head:    Normocephalic, without obvious abnormality, atraumatic   Eyes:            Lids and lashes normal, conjunctivae and sclerae normal, no   icterus, no pallor, corneas clear, PERRLA   Ears:    Ears appear intact with no abnormalities noted   Throat:   No oral lesions, no thrush, oral mucosa moist   Neck:   No adenopathy, supple, trachea midline, no thyromegaly, no   carotid bruit, no JVD   Back:     No kyphosis present, no scoliosis present, no skin lesions, erythema or scars, no tenderness to percussion or palpation, range of motion normal   Lungs:     Clear to auscultation,respirations regular, even and unlabored    Heart:    Regular rhythm and normal rate, normal S1 and S2, no murmur, no gallop, no rub, no click   Chest Wall:    No abnormalities observed   Abdomen:     Normal bowel sounds, no masses, no organomegaly, soft        non-tender, non-distended, no guarding, no rebound  tenderness   Extremities:   Moves all extremities well, no edema, no cyanosis, no redness   Pulses:   Pulses palpable and equal bilaterally. Normal radial, carotid, femoral, dorsalis pedis and posterior tibial pulses bilaterally. " "Normal abdominal aorta   Skin:  Psychiatric:   No bleeding, bruising or rash    Alert and oriented x 3, normal mood and affect   Lab Review:     Results from last 7 days   Lab Units 05/08/19  0830   SODIUM mmol/L 142   POTASSIUM mmol/L 3.7   CHLORIDE mmol/L 102   CO2 mmol/L 23.1   BUN mg/dL 31*   CREATININE mg/dL 0.75   CALCIUM mg/dL 9.5   BILIRUBIN mg/dL 0.6   ALK PHOS U/L 61   ALT (SGPT) U/L 18   AST (SGOT) U/L 19   GLUCOSE mg/dL 96     Results from last 7 days   Lab Units 05/08/19  1131 05/08/19  0830   TROPONIN T ng/mL <0.010 <0.010     Results from last 7 days   Lab Units 05/08/19  0830   WBC 10*3/mm3 10.58   HEMOGLOBIN g/dL 15.4   HEMATOCRIT % 46.3   PLATELETS 10*3/mm3 213                           I personally viewed and interpreted the patient's EKG/Telemetry data.    Sinus rhythm with no ACS changes    Assessment and Plan:     1.  Chest pain/tightness - now 4/10, describes as tightness only.  Does not radiate.  Try nitro paste 1/2 \" for chest pain/tightness. She threw up the s/l nitro in the ER  Check Stress    2.  SOB - increasing over the last few months.  Father with history of valve replacement (unsure of disease process and which valve was replaced)  Check echo    3.  HTN - continue home medication    4.  Hyperlipidemia - continue home mediction    Nicolás Coleman, THAIS  05/08/19  12:44 PM              "

## 2019-05-08 NOTE — ED NOTES
Blood drawn with IV start, held at bedside awaiting MD orders     Maria G Scott, RN  05/08/19 9168

## 2019-05-08 NOTE — ED PROVIDER NOTES
"Subjective     History provided by:  Patient and spouse    History of Present Illness    · Chief complaint: Chest pressure    · Location: Center of the chest nonradiating    · Quality/Severity: Patient describes the discomfort as \"pressure\"    · Timing/Onset: She woke at 6 AM with the chest pressure    · Modifying Factors: She reports no aggravating or relieving factors.    · Associated symptoms: She states she was diaphoretic at 6 AM with the onset of it, but none since.  She states that she was not nauseated but she did vomit at 7:10 AM.  She states she may have been a little bit short of breath.  She denies any fatigue or URI symptoms.    · Narrative: The patient is a 63-year-old white female who awoke at 6 AM by chest pressure in the center of her chest.  There is no radiation of discomfort.  She states she was diaphoretic at first and that resolved.  She states that she was not nauseated but vomited at 7:10 AM this morning.  She denies any palpitations, abdominal pain, or nausea.  She reports no aggravating or relieving factors.  She states activity and taking a deep breath does not affect the discomfort.  Her past medical history is negative for coronary artery disease.  She states she has borderline high blood pressure for which her PCP has her on lisinopril 10 mg.  She also has a history of arthritis that is severe.  Her family history is significant for mother having an MI at age 74.  Social history the patient is retired, , has never smoked or drink alcohol.    Review of Systems   Constitutional: Negative for activity change, appetite change, chills, diaphoresis, fatigue and fever.   HENT: Negative for congestion, dental problem, ear pain, hearing loss, mouth sores, postnasal drip, rhinorrhea, sinus pressure, sore throat and voice change.    Eyes: Negative for photophobia, pain, discharge, redness and visual disturbance.   Respiratory: Positive for shortness of breath. Negative for cough, chest " "tightness, wheezing and stridor.    Cardiovascular: Positive for chest pain. Negative for palpitations and leg swelling.   Gastrointestinal: Positive for vomiting. Negative for abdominal pain, diarrhea and nausea.   Genitourinary: Negative for difficulty urinating, dysuria, flank pain, frequency, hematuria, pelvic pain and urgency.   Musculoskeletal: Negative for arthralgias, back pain, gait problem, joint swelling, myalgias, neck pain and neck stiffness.   Skin: Negative for color change and rash.   Neurological: Negative for dizziness, tremors, seizures, syncope, facial asymmetry, speech difficulty, weakness, light-headedness, numbness and headaches.   Hematological: Negative for adenopathy.   Psychiatric/Behavioral: Negative.  Negative for confusion and decreased concentration. The patient is not nervous/anxious.      Past Medical History:   Diagnosis Date   • Arthritis    • GERD (gastroesophageal reflux disease)    • Hyperlipidemia    • Hypertension    • Migraine    • Plantar fasciitis      /54 (BP Location: Left arm, Patient Position: Lying)   Pulse 95   Temp 97.9 °F (36.6 °C) (Oral)   Resp 18   Ht 162.6 cm (64\")   Wt 77.6 kg (171 lb)   SpO2 96%   BMI 29.35 kg/m²     Past Medical History:   Diagnosis Date   • Arthritis    • GERD (gastroesophageal reflux disease)    • Hyperlipidemia    • Hypertension    • Migraine    • Plantar fasciitis        No Known Allergies    Past Surgical History:   Procedure Laterality Date   • CHOLECYSTECTOMY         Family History   Problem Relation Age of Onset   • Breast cancer Maternal Grandmother        Social History     Socioeconomic History   • Marital status:      Spouse name: Not on file   • Number of children: Not on file   • Years of education: Not on file   • Highest education level: Not on file   Tobacco Use   • Smoking status: Never Smoker   • Smokeless tobacco: Never Used   Substance and Sexual Activity   • Alcohol use: No   • Drug use: No   • Sexual " activity: Defer           Objective   Physical Exam   Constitutional: She is oriented to person, place, and time. She appears well-developed and well-nourished. No distress.   The patient appears healthy in no acute distress.  Review of her vital signs: Her temperature is 98.1, respirations normal 18 with a normal oxygen saturation 100% on room is mildly tachycardic with a heart rate of 98, blood pressure elevated 158/88.   HENT:   Head: Normocephalic and atraumatic.   Nose: Nose normal.   Mouth/Throat: Oropharynx is clear and moist. No oropharyngeal exudate.   Eyes: EOM are normal. Pupils are equal, round, and reactive to light. Right eye exhibits no discharge. Left eye exhibits no discharge. No scleral icterus.   Neck: Normal range of motion. Neck supple. No JVD present. No thyromegaly present.   Cardiovascular: Normal rate, regular rhythm and normal heart sounds.   No murmur heard.  Pulmonary/Chest: Effort normal and breath sounds normal. She has no wheezes. She has no rales. She exhibits no tenderness.   Pressure applied to the chest wall does not exacerbate or relieve the chest pressure.   Abdominal: Soft. Bowel sounds are normal. She exhibits no distension. There is no tenderness.   Musculoskeletal: Normal range of motion. She exhibits no edema, tenderness or deformity.   Lymphadenopathy:     She has no cervical adenopathy.   Neurological: She is alert and oriented to person, place, and time. No cranial nerve deficit. Coordination normal.   No focal motor sensory deficit   Skin: Skin is warm and dry. Capillary refill takes less than 2 seconds. No rash noted. She is not diaphoretic.   Psychiatric: She has a normal mood and affect. Her behavior is normal. Judgment and thought content normal.   Nursing note and vitals reviewed.      Procedures           ED Course  ED Course as of May 08 1643   Wed May 08, 2019   0911 The patient was administered aspirin 325 mg p.o.  She was administered a sublingual nitroglycerin  "which caused her to become nauseated and vomit at 09:00.  She did not become hypotensive she was in sinus tachycardia afterwards with a rate of 125.  [TP]   0912 The patient's EKG was performed at 08:15, and interpreted by me at 08:17.  My interpretation of the tracing is sinus tachycardia with a rate of 101, normal axis, normal conduction, no acute ST segment elevation or depression consistent with ischemia, no ectopy, voltage was low in the precordial leads.  [TP]   0913 Review of the patient's laboratory studies: Her CMP had an elevated BUN of 31 with a normal creatinine normal GFR 78.  Cardiac troponin was normal.  CBC had a normal white count normal hemoglobin hematocrit.  Her d-dimer was negative.  [TP]   0914 Due to the patient's elevated BUN and I ordered a normal saline 500 cc bolus followed by an infusion at 125cc/h.  [TP]   1006 Repeat examination at 10:08 the patient reports that her nausea has resolved.  She still has chest pressure.  The patient states that now she feels like she has \"no\".  [TP]   1014 My interpretation of the radiologist interpretation of the patient's chest x-ray was normal.  [TP]   1015 10:12 patient discussed with Dr. Roman, hospitalist, who agreed to admit the patient to observation for further evaluation of her chest pressure.  [TP]      ED Course User Index  [TP] Everett Harrington MD                  MDM  Number of Diagnoses or Management Options  Chest pain, unspecified type: new and requires workup     Amount and/or Complexity of Data Reviewed  Clinical lab tests: reviewed and ordered  Tests in the radiology section of CPT®: ordered and reviewed  Tests in the medicine section of CPT®: ordered and reviewed  Discuss the patient with other providers: yes  Independent visualization of images, tracings, or specimens: yes    Risk of Complications, Morbidity, and/or Mortality  Presenting problems: high  Diagnostic procedures: high  Management options: high  General comments: My " differential diagnosis for chest pain includes but is not limited to:  Muscle strain, costochondritis, myositis, pleurisy, rib fracture, intercostal neuritis, herpes zoster, tumor, myocardial infarction, coronary syndrome, unstable angina, angina, aortic dissection, mitral valve prolapse, pericarditis, palpitations, pulmonary embolus, pneumonia, pneumothorax, lung cancer, GERD, esophagitis, esophageal spasm    Patient Progress  Patient progress: stable        Final diagnoses:   Chest pain, unspecified type           Labs Reviewed   COMPREHENSIVE METABOLIC PANEL - Abnormal; Notable for the following components:       Result Value    BUN 31 (*)     BUN/Creatinine Ratio 41.3 (*)     All other components within normal limits    Narrative:     GFR Normal >60  Chronic Kidney Disease <60  Kidney Failure <15   CBC WITH AUTO DIFFERENTIAL - Abnormal; Notable for the following components:    Neutrophil % 86.2 (*)     Lymphocyte % 6.0 (*)     Neutrophils, Absolute 9.13 (*)     Lymphocytes, Absolute 0.63 (*)     All other components within normal limits   TROPONIN (IN-HOUSE) - Normal    Narrative:     Troponin T Reference Range:  <= 0.03 ng/mL-   Negative for AMI  >0.03 ng/mL-     Abnormal for myocardial necrosis.  Clinicians would have to utilize clinical acumen, EKG, Troponin and serial changes to determine if it is an Acute Myocardial Infarction or myocardial injury due to an underlying chronic condition.    D-DIMER, QUANTITATIVE - Normal    Narrative:     Can be elevated in, but is not diagnostic for deep vein thrombosis (DVT) or pulmonary embolis (PE).  It is also elevated in other medical conditions.  Clinical correlation is required.  The negative cut-off value for the D-Dimer is 0.50 mcg FEU/mL for DVT and PE.   TROPONIN (IN-HOUSE) - Normal    Narrative:     Troponin T Reference Range:  <= 0.03 ng/mL-   Negative for AMI  >0.03 ng/mL-     Abnormal for myocardial necrosis.  Clinicians would have to utilize clinical acumen,  EKG, Troponin and serial changes to determine if it is an Acute Myocardial Infarction or myocardial injury due to an underlying chronic condition.    RAINBOW DRAW    Narrative:     The following orders were created for panel order [facility] Draw.  Procedure                               Abnormality         Status                     ---------                               -----------         ------                     Light Blue Top[[id number]]                                   Final result               Green Top (Gel)[[id number]]                                  Final result               Lavender Top[[id number]]                                     Final result               Gold Top - SST[[id number]]                                   Final result                 Please view results for these tests on the individual orders.   TROPONIN (IN-HOUSE)   CBC AND DIFFERENTIAL    Narrative:     The following orders were created for panel order CBC & Differential.  Procedure                               Abnormality         Status                     ---------                               -----------         ------                     CBC Auto Differential[[id number]]        Abnormal            Final result                 Please view results for these tests on the individual orders.   LIGHT BLUE TOP   GREEN TOP   LAVENDER TOP   GOLD TOP - SST     XR Chest 2 View   ED Interpretation   No active pulmonary disease.       This report was finalized on 5/8/2019 9:33 AM by Dr. Sergei Floyd MD.          Final Result   No active pulmonary disease.       This report was finalized on 5/8/2019 9:33 AM by Dr. Sergei Floyd MD.                 Medication List      No changes were made to your prescriptions during this visit.            Everett Harrington MD  05/08/19 [id number]

## 2019-05-08 NOTE — PLAN OF CARE
"Problem: Patient Care Overview  Goal: Plan of Care Review  Outcome: Ongoing (interventions implemented as appropriate)   05/08/19 1210   Coping/Psychosocial   Plan of Care Reviewed With patient   Plan of Care Review   Progress no change   OTHER   Outcome Summary Patient admitted wiht Angina, EKS ang cardiac monitoring are stable. Trending cardiac enzymes and EKG. Patient with complaint of \"not feeling right\"      Goal: Individualization and Mutuality   05/08/19 1210   Individualization   Patient Specific Preferences None   Patient Specific Goals (Include Timeframe) Discharge tomorrow (5/9)   Mutuality/Individual Preferences   What Anxieties, Fears, Concerns, or Questions Do You Have About Your Care? \"That my heart isn't working right\"         "

## 2019-05-08 NOTE — ED NOTES
Pt's family came to RN station to say pt is vomiting.  She stated she had vomited earlier today but was not nauseated at time of triage.  Family stated she was nauseated after Nitro.  MD aware.  New orders received     Maria G Scott RN  05/08/19 [id number]

## 2019-05-08 NOTE — H&P
"[facility] HOSPITALIST     Shan Collier MD    CHIEF COMPLAINT: Chest pressure and nausea    HISTORY OF PRESENT ILLNESS:    62 yo WF w/ PMH of HTN, chronic migraines, and GERD.  Who personally states she was in her usual good health prior to this morning.  When she woke up from sleep about 4 AM with a pressure-like chest pain that was felt over most of her anterior chest, without radiation that was associated with nausea and diaphoresis.  She was brought at her 's insistence, and developed vomiting with nitro administration.  Pain has gotten better with some nitro.  Chest pain is currently minimal.  Patient's biggest complaint currently is that of a headache like her chronic migraines.  She denies radiation of the pain.    Patient has been continued to be n.p.o. throughout the day, and patient's attributes her headache and feeling of overall tiredness to be secondary to her getting exercise stress test without eating.    When asked what her usual activity level is patient states that she can usually climb 3 or 4 sets of stairs without becoming winded.  States that she is never had this chest pressure before.  And states that she has never felt indigestion \"in her life\".  (But patient has a past medical history of GERD and is on a PPI)    Daughter suggests that patient may be minimizing her symptoms, and that she has noticed progressive exercise intolerance over the last month or 2.  And states that her mother has been climbing less and less stairs, and complaining of dyspnea as to the reason she has been limiting her exercise.    Patient states the day prior to admission, she was out planting flowers.  And this activity was only limited due to her chronic pain in her hip.    Patient denies ever drinking or smoking.  Drinks about 2 glasses of sweet tea a day.  And a small cup of coffee.  Patient is on both estradiol and Provera hormone supplementation.  She also has chronic NSAID use " with Daypro.    Review of systems is negative for fever chills cough palpitations.    Both patient's parents had heart disease in their 60s and 70s.  Denies family medical history of early heart disease.      Past Medical History:   Diagnosis Date   • Arthritis    • GERD (gastroesophageal reflux disease)    • Hyperlipidemia    • Hypertension    • Migraine    • Plantar fasciitis      Past Surgical History:   Procedure Laterality Date   • CHOLECYSTECTOMY       Family History   Problem Relation Age of Onset   • Breast cancer Maternal Grandmother      Social History     Tobacco Use   • Smoking status: Never Smoker   • Smokeless tobacco: Never Used   Substance Use Topics   • Alcohol use: No   • Drug use: No     Medications Prior to Admission   Medication Sig Dispense Refill Last Dose   • atorvastatin (LIPITOR) 10 MG tablet 10 mg Daily.   5/7/2019 at Unknown time   • Calcium-Magnesium-Vitamin D ER (CALCIUM 1200+D3) 600- MG-MG-UNIT tablet sustained-release 24 hour Take 1 tablet by mouth Daily.   5/7/2019 at Unknown time   • estradiol (ESTRACE) 0.5 MG tablet TAKE ONE TABLET BY MOUTH DAILY 90 tablet 2 5/7/2019 at Unknown time   • lisinopril (PRINIVIL,ZESTRIL) 10 MG tablet 10 mg Daily.   5/7/2019 at Unknown time   • medroxyPROGESTERone (PROVERA) 2.5 MG tablet TAKE 1 TABLET BY MOUTH ONCE A DAY 90 tablet 2 5/7/2019 at Unknown time   • montelukast (SINGULAIR) 10 MG tablet Take 10 mg by mouth Daily.   5/7/2019 at Unknown time   • Multiple Vitamins-Minerals (MULTIVITAMIN WITH MINERALS) tablet tablet Take 1 tablet by mouth Daily.   5/7/2019 at Unknown time   • omeprazole (PriLOSEC) 40 MG capsule 40 mg Daily.   5/7/2019 at Unknown time   • oxaprozin (DAYPRO) 600 MG tablet Take 600 mg by mouth 2 (Two) Times a Day.   5/7/2019 at Unknown time   • SUMAtriptan (IMITREX) 50 MG tablet Take 50 mg by mouth Every 2 (Two) Hours As Needed for migraine. Take one tablet at onset of headache. May repeat dose one time in 2 hours if headache  "not relieved.   More than a month at Unknown time     Allergies:  Patient has no known allergies.  Debilities: As per HPI and Physical Exam.     REVIEW OF SYSTEMS:  Please see the above history of present illness for pertinent positives and negatives.  The remainder of the patient's systems have been reviewed and are negative.     Vital Signs  Temp:  [97.9 °F (36.6 °C)-98.1 °F (36.7 °C)] 97.9 °F (36.6 °C)  Heart Rate:  [] 95  Resp:  [16-18] 18  BP: (109-158)/() 109/54    Flowsheet Rows      First Filed Value   Admission Height  162.6 cm (64\") Documented at 05/08/2019 0800   Admission Weight  77.4 kg (170 lb 11.2 oz) Documented at 05/08/2019 0800           Physical Exam:  Physical Exam   Constitutional: She is oriented to person, place, and time. She appears well-developed and well-nourished. No distress.   HENT:   Head: Normocephalic and atraumatic.   Right Ear: External ear normal.   Left Ear: External ear normal.   Nose: Nose normal.   Mouth/Throat: Oropharynx is clear and moist. No oropharyngeal exudate.   Eyes: Conjunctivae are normal. Pupils are equal, round, and reactive to light. No scleral icterus.   Neck: Normal range of motion. Neck supple. No JVD present. No tracheal deviation present.   Cardiovascular: Normal rate, regular rhythm and normal heart sounds. Exam reveals no friction rub.   No murmur heard.  Pulmonary/Chest: Effort normal and breath sounds normal. No stridor. No respiratory distress. She has no wheezes. She has no rales.   Abdominal: Soft. Bowel sounds are normal. She exhibits no distension. There is no tenderness. There is no guarding.   Musculoskeletal: Normal range of motion. She exhibits no edema.   Neurological: She is alert and oriented to person, place, and time. She displays normal reflexes. She exhibits normal muscle tone.   Skin: Skin is warm and dry. She is not diaphoretic. No erythema. No pallor.   Psychiatric: She has a normal mood and affect. Her behavior is normal. "   Nursing note and vitals reviewed.       Results Review:    I reviewed the patient's new clinical results.  Lab Results (most recent)     Procedure Component Value Units Date/Time    Troponin [[id number]]  (Normal) Collected:  05/08/19 1717    Specimen:  Blood Updated:  05/08/19 1743     Troponin T <0.010 ng/mL     Narrative:       Troponin T Reference Range:  <= 0.03 ng/mL-   Negative for AMI  >0.03 ng/mL-     Abnormal for myocardial necrosis.  Clinicians would have to utilize clinical acumen, EKG, Troponin and serial changes to determine if it is an Acute Myocardial Infarction or myocardial injury due to an underlying chronic condition.     Troponin [[id number]]  (Normal) Collected:  05/08/19 1131    Specimen:  Blood Updated:  05/08/19 1217     Troponin T <0.010 ng/mL     Narrative:       Troponin T Reference Range:  <= 0.03 ng/mL-   Negative for AMI  >0.03 ng/mL-     Abnormal for myocardial necrosis.  Clinicians would have to utilize clinical acumen, EKG, Troponin and serial changes to determine if it is an Acute Myocardial Infarction or myocardial injury due to an underlying chronic condition.     D-dimer, Quantitative [[id number]]  (Normal) Collected:  05/08/19 0830    Specimen:  Blood Updated:  05/08/19 1006     D-Dimer, Quantitative 0.28 MCGFEU/mL     Narrative:       Can be elevated in, but is not diagnostic for deep vein thrombosis (DVT) or pulmonary embolis (PE).  It is also elevated in other medical conditions.  Clinical correlation is required.  The negative cut-off value for the D-Dimer is 0.50 mcg FEU/mL for DVT and PE.    [place] Draw [[id number]] Collected:  05/08/19 0830    Specimen:  Blood Updated:  05/08/19 0930    Narrative:       The following orders were created for panel order [place] Draw.  Procedure                               Abnormality         Status                     ---------                               -----------         ------                     Light Blue Top[[id number]]                                    Final result               Green Top (Gel)[[id number]]                                  Final result               Lavender Top[[id number]]                                     Final result               Gold Top - SST[[id number]]                                   Final result                 Please view results for these tests on the individual orders.    Light Blue Top [[id number]] Collected:  05/08/19 0830    Specimen:  Blood Updated:  05/08/19 0930     Extra Tube hold for add-on     Comment: Auto resulted       Green Top (Gel) [[id number]] Collected:  05/08/19 0830    Specimen:  Blood Updated:  05/08/19 0930     Extra Tube Hold for add-ons.     Comment: Auto resulted.       Lavender Top [[id number]] Collected:  05/08/19 0830    Specimen:  Blood Updated:  05/08/19 0930     Extra Tube hold for add-on     Comment: Auto resulted       Gold Top - SST [[id number]] Collected:  05/08/19 0830    Specimen:  Blood Updated:  05/08/19 0930     Extra Tube Hold for add-ons.     Comment: Auto resulted.       Comprehensive Metabolic Panel [[id number]]  (Abnormal) Collected:  05/08/19 0830    Specimen:  Blood Updated:  05/08/19 0853     Glucose 96 mg/dL      BUN 31 mg/dL      Creatinine 0.75 mg/dL      Sodium 142 mmol/L      Potassium 3.7 mmol/L      Chloride 102 mmol/L      CO2 23.1 mmol/L      Calcium 9.5 mg/dL      Total Protein 7.4 g/dL      Albumin 4.50 g/dL      ALT (SGPT) 18 U/L      AST (SGOT) 19 U/L      Alkaline Phosphatase 61 U/L      Total Bilirubin 0.6 mg/dL      eGFR Non African Amer 78 mL/min/1.73      Globulin 2.9 gm/dL      A/G Ratio 1.6 g/dL      BUN/Creatinine Ratio 41.3     Anion Gap 16.9 mmol/L     Narrative:       GFR Normal >60  Chronic Kidney Disease <60  Kidney Failure <15    CBC & Differential [[id number]] Collected:  05/08/19 0830    Specimen:  Blood Updated:  05/08/19 0835    Narrative:       The following orders were created for panel order CBC & Differential.  Procedure                                Abnormality         Status                     ---------                               -----------         ------                     CBC Auto Differential[[id number]]        Abnormal            Final result                 Please view results for these tests on the individual orders.    CBC Auto Differential [[id number]]  (Abnormal) Collected:  05/08/19 0830    Specimen:  Blood Updated:  05/08/19 0835     WBC 10.58 10*3/mm3      RBC 5.06 10*6/mm3      Hemoglobin 15.4 g/dL      Hematocrit 46.3 %      MCV 91.5 fL      MCH 30.4 pg      MCHC 33.3 g/dL      RDW 13.1 %      RDW-SD 44.2 fl      MPV 10.1 fL      Platelets 213 10*3/mm3      Neutrophil % 86.2 %      Lymphocyte % 6.0 %      Monocyte % 5.4 %      Eosinophil % 1.6 %      Basophil % 0.5 %      Immature Grans % 0.3 %      Neutrophils, Absolute 9.13 10*3/mm3      Lymphocytes, Absolute 0.63 10*3/mm3      Monocytes, Absolute 0.57 10*3/mm3      Eosinophils, Absolute 0.17 10*3/mm3      Basophils, Absolute 0.05 10*3/mm3      Immature Grans, Absolute 0.03 10*3/mm3      nRBC 0.0 /100 WBC           Imaging Results (most recent)     Procedure Component Value Units Date/Time    XR Chest 2 View [[id number]] Collected:  05/08/19 0933     Updated:  05/08/19 0941    Narrative:       CHEST 2 VIEWS 05/08/2019     HISTORY:   Chest pain and nausea and shortness of breath beginning at 6:00 AM this  morning.     FINDINGS:  The heart is normal in size. There is poor inspiratory result with  bibasilar discoid atelectasis. The lungs are otherwise clear. There are  no pleural effusions.       Impression:       No active pulmonary disease.     This report was finalized on 5/8/2019 9:33 AM by Dr. Sergei Floyd MD.           reviewed    ECG/EMG Results (most recent)     Procedure Component Value Units Date/Time    ECG 12 Lead [[id number]] Collected:  05/08/19 1117     Updated:  05/08/19 1118    Narrative:       HEART RATE= 91  bpm  RR Interval= 660  ms  FL Interval= 193  ms  P  Horizontal Axis= 6  deg  P Front Axis= 67  deg  QRSD Interval= 84  ms  QT Interval= 365  ms  QRS Axis= 30  deg  T Wave Axis= 54  deg  - NORMAL ECG -  Sinus rhythm  Electronically Signed By:   Date and Time of Study: 2019-05-08 11:17:35    Adult Transthoracic Echo Complete W/ Cont if Necessary Per Protocol [[id number]] Collected:  05/08/19 1440     Updated:  05/08/19 1544     BSA 1.8 m^2      IVSd 1.0 cm      LVIDd 3.8 cm      LVIDs 2.7 cm      LVPWd 1.1 cm      IVS/LVPW 0.91     FS 29.8 %      EDV(Teich) 61.6 ml      ESV(Teich) 26.0 ml      EF(Teich) 57.7 %      EDV(cubed) 54.4 ml      ESV(cubed) 18.8 ml      EF(cubed) 65.4 %      LV mass(C)d 125.0 grams      LV mass(C)dI 68.3 grams/m^2      SV(Teich) 35.5 ml      SI(Teich) 19.4 ml/m^2      SV(cubed) 35.6 ml      SI(cubed) 19.5 ml/m^2      Ao root diam 2.8 cm      Ao root area 6.2 cm^2      ACS 1.8 cm      LA dimension 3.2 cm      LA/Ao 1.1     LVOT diam 2.1 cm      LVOT area 3.5 cm^2      LVOT area(traced) 3.5 cm^2      RVOT diam 2.0 cm      RVOT area 3.1 cm^2      LVLd ap4 6.6 cm      EDV(MOD-sp4) 64.7 ml      LVLs ap4 6.0 cm      ESV(MOD-sp4) 27.0 ml      EF(MOD-sp4) 58.3 %      LVLd ap2 5.9 cm      EDV(MOD-sp2) 45.1 ml      LVLs ap2 4.3 cm      ESV(MOD-sp2) 18.0 ml      EF(MOD-sp2) 60.1 %      SV(MOD-sp4) 37.7 ml      SI(MOD-sp4) 20.6 ml/m^2      SV(MOD-sp2) 27.1 ml      SI(MOD-sp2) 14.8 ml/m^2      Ao root area (BSA corrected) 1.5     LV Robins Vol (BSA corrected) 35.4 ml/m^2      LV Sys Vol (BSA corrected) 14.8 ml/m^2      MV A dur 0.11 sec      MV E max danuta 80.0 cm/sec      MV A max danuta 98.0 cm/sec      MV E/A 0.82     MV V2 max 118.0 cm/sec      MV max PG 5.6 mmHg      MV V2 mean 76.6 cm/sec      MV mean PG 3.0 mmHg      MV V2 VTI 21.8 cm      MVA(VTI) 3.5 cm^2      MV P1/2t max danuta 97.7 cm/sec      MV P1/2t 43.8 msec      MVA(P1/2t) 5.0 cm^2      MV dec slope 654.0 cm/sec^2      MV dec time 0.11 sec      Ao pk danuta 136.0 cm/sec      Ao max PG 7.4 mmHg      Ao  max PG (full) 0.84 mmHg      Ao V2 mean 89.0 cm/sec      Ao mean PG 4.0 mmHg      Ao mean PG (full) 1.0 mmHg      Ao V2 VTI 24.3 cm      LEON(I,A) 3.2 cm^2      LEON(I,D) 3.2 cm^2      LEON(V,A) 3.3 cm^2      LEON(V,D) 3.3 cm^2      LV V1 max PG 6.6 mmHg      LV V1 mean PG 3.0 mmHg      LV V1 max 128.0 cm/sec      LV V1 mean 79.4 cm/sec      LV V1 VTI 22.3 cm      SV(Ao) 149.6 ml      SI(Ao) 81.8 ml/m^2      SV(LVOT) 77.2 ml      SV(RVOT) 38.3 ml      SI(LVOT) 42.2 ml/m^2      PA V2 max 102.0 cm/sec      PA max PG 4.2 mmHg      PA max PG (full) 1.5 mmHg       CV ECHO CHIQUITA - PVA(V,A) 2.5 cm^2       CV ECHO CHIQUITA - PVA(V,D) 2.5 cm^2      PA acc time 0.07 sec      RV V1 max PG 2.7 mmHg      RV V1 mean PG 1.0 mmHg      RV V1 max 82.0 cm/sec      RV V1 mean 46.5 cm/sec      RV V1 VTI 12.2 cm      PA pr(Accel) 47.5 mmHg      Pulm Sys Esteban 61.6 cm/sec      Pulm Robins Esteban 39.8 cm/sec      Pulm S/D 1.5     Qp/Qs 0.5     Pulm A Revs Dur 0.12 sec      Pulm A Revs Esteban 59.7 cm/sec      MVA P1/2T LCG 2.3 cm^2       CV ECHO CHIQUITA - BZI_BMI 29.4 kilograms/m^2       CV ECHO CHIQUITA - BSA([facility]) 1.9 m^2       CV ECHO CHIQUITA - BZI_METRIC_WEIGHT 77.6 kg       CV ECHO CHIQUITA - BZI_METRIC_HEIGHT 162.6 cm      Target HR (85%) 132 bpm      Max. Pred. HR (100%) 155 bpm       CV VAS BP RIGHT /65 mmHg      TDI S' 14.00 cm/sec      RV Base 2.30 cm      LA Volume Index 16.0 mL/m2      Avg E/e' ratio 10.00     EF(MOD-bp) 60.0 %      Lat Peak E' Esteban 7.0 cm/sec      Med Peak E' Esteban 9.00 cm/sec      TAPSE (>1.6) 1.90 cm2     Narrative:       · Left ventricular systolic function is normal. Calculated EF = 60.0%.   Estimated EF was in agreement with the calculated EF. Normal left   ventricular cavity size and wall thickness noted. All left ventricular   wall segments contract normally. Left ventricular diastolic dysfunction is   noted (grade I) consistent with impaired relaxation.       ECG 12 Lead [[id number]] Collected:  05/08/19 0815      "Updated:  05/08/19 1550    Narrative:       HEART RATE= 101  bpm  RR Interval= 596  ms  MI Interval= 172  ms  P Horizontal Axis= -57  deg  P Front Axis= 74  deg  QRSD Interval= 82  ms  QT Interval= 345  ms  QRS Axis= 29  deg  T Wave Axis= 56  deg  - OTHERWISE NORMAL ECG -  Sinus tachycardia  Low voltage, precordial leads  Electronically Signed By:   Date and Time of Study: 2019-05-08 08:15:51    ECG 12 Lead [[id number]] Collected:  05/08/19 1709     Updated:  05/08/19 1713    Narrative:       HEART RATE= 94  bpm  RR Interval= 636  ms  MI Interval= 191  ms  P Horizontal Axis= 4  deg  P Front Axis= 51  deg  QRSD Interval= 77  ms  QT Interval= 353  ms  QRS Axis= 16  deg  T Wave Axis= 46  deg  - OTHERWISE NORMAL ECG -  Sinus rhythm  Low voltage, precordial leads  Electronically Signed By:   Date and Time of Study: 2019-05-08 17:09:20        reviewed    Assessment/Plan     Chest pain with mostly typical features  -Patient got aspirin and nitro in ER, was intolerant of p.o. nitro, cardiology has ordered Nitropaste  -Patient reports that she was told by stress tach that she did \"everything she needed\", but review of preliminary results suggest that patient had poor exercise tolerance consistent with a moderate risk study  -Review of cardiology's note, patient will continue to be observed overnight, will allow patient to eat until midnight, will re-make patient n.p.o., troponins have been negative so far, will check in a.m., cardiology will reevaluate in a.m. to decide on if further ischemic work-up is necessary, notified patient and daughter that this may include transfer to [facility] [place] operated by [facility], or further testing here to be decided by the cardiologist in the a.m.  Patient and daughter are okay with this plan    Acute on chronic migraine  -May be related to nitro, will not continue home Imitrex, due to association with occasional MI and coronary vasospasm, will give acetaminophen, if not controlled with acetaminophen will try " NSAIDs, despite population studies correlating NSAIDs with increased risk of MI, feel current risk versus benefit while patient is being worked up of okay risk, though if migraine continues to be uncontrolled patient may need migraine cocktail    GERD  -To new home PPI    I discussed the patients findings and my recommendations with patient and daughter.     Daniela Roman MD  05/08/19  5:58 PM

## 2019-05-08 NOTE — ED NOTES
Pt stated her nausea is improved and she has not vomited again     Maria G Scott, RN  05/08/19 7537

## 2019-05-09 LAB
ADV 40+41 DNA STL QL NAA+NON-PROBE: NOT DETECTED
ANION GAP SERPL CALCULATED.3IONS-SCNC: 14.4 MMOL/L
ASTRO TYP 1-8 RNA STL QL NAA+NON-PROBE: NOT DETECTED
BASOPHILS # BLD AUTO: 0.02 10*3/MM3 (ref 0–0.2)
BASOPHILS NFR BLD AUTO: 0.3 % (ref 0–1.5)
BUN BLD-MCNC: 20 MG/DL (ref 8–23)
BUN/CREAT SERPL: 28.2 (ref 7–25)
C CAYETANENSIS DNA STL QL NAA+NON-PROBE: NOT DETECTED
CALCIUM SPEC-SCNC: 8.4 MG/DL (ref 8.6–10.5)
CAMPY SP DNA.DIARRHEA STL QL NAA+PROBE: NOT DETECTED
CHLORIDE SERPL-SCNC: 105 MMOL/L (ref 98–107)
CHOLEST SERPL-MCNC: 129 MG/DL (ref 0–200)
CO2 SERPL-SCNC: 21.6 MMOL/L (ref 22–29)
CREAT BLD-MCNC: 0.71 MG/DL (ref 0.57–1)
CRYPTOSP STL CULT: NOT DETECTED
DEPRECATED RDW RBC AUTO: 43.1 FL (ref 37–54)
E COLI DNA SPEC QL NAA+PROBE: NOT DETECTED
E HISTOLYT AG STL-ACNC: NOT DETECTED
EAEC PAA PLAS AGGR+AATA ST NAA+NON-PRB: NOT DETECTED
EC STX1 + STX2 GENES STL NAA+PROBE: NOT DETECTED
EOSINOPHIL # BLD AUTO: 0.06 10*3/MM3 (ref 0–0.4)
EOSINOPHIL NFR BLD AUTO: 0.9 % (ref 0.3–6.2)
EPEC EAE GENE STL QL NAA+NON-PROBE: NOT DETECTED
ERYTHROCYTE [DISTWIDTH] IN BLOOD BY AUTOMATED COUNT: 12.9 % (ref 12.3–15.4)
ETEC LTA+ST1A+ST1B TOX ST NAA+NON-PROBE: NOT DETECTED
G LAMBLIA DNA SPEC QL NAA+PROBE: NOT DETECTED
GFR SERPL CREATININE-BSD FRML MDRD: 83 ML/MIN/1.73
GLUCOSE BLD-MCNC: 100 MG/DL (ref 65–99)
HCT VFR BLD AUTO: 39.7 % (ref 34–46.6)
HDLC SERPL-MCNC: 61 MG/DL (ref 40–60)
HGB BLD-MCNC: 13.3 G/DL (ref 12–15.9)
IMM GRANULOCYTES # BLD AUTO: 0.03 10*3/MM3 (ref 0–0.05)
IMM GRANULOCYTES NFR BLD AUTO: 0.4 % (ref 0–0.5)
INR PPP: 1.17 (ref 0.9–1.1)
LDLC SERPL CALC-MCNC: 57 MG/DL (ref 0–100)
LDLC/HDLC SERPL: 0.93 {RATIO}
LYMPHOCYTES # BLD AUTO: 0.76 10*3/MM3 (ref 0.7–3.1)
LYMPHOCYTES NFR BLD AUTO: 10.9 % (ref 19.6–45.3)
MAGNESIUM SERPL-MCNC: 1.7 MG/DL (ref 1.6–2.4)
MCH RBC QN AUTO: 30.6 PG (ref 26.6–33)
MCHC RBC AUTO-ENTMCNC: 33.5 G/DL (ref 31.5–35.7)
MCV RBC AUTO: 91.3 FL (ref 79–97)
MONOCYTES # BLD AUTO: 0.51 10*3/MM3 (ref 0.1–0.9)
MONOCYTES NFR BLD AUTO: 7.3 % (ref 5–12)
NEUTROPHILS # BLD AUTO: 5.62 10*3/MM3 (ref 1.7–7)
NEUTROPHILS NFR BLD AUTO: 80.2 % (ref 42.7–76)
NOROVIRUS GI+II RNA STL QL NAA+NON-PROBE: DETECTED
NRBC BLD AUTO-RTO: 0 /100 WBC (ref 0–0.2)
P SHIGELLOIDES DNA STL QL NAA+NON-PROBE: NOT DETECTED
PLATELET # BLD AUTO: 169 10*3/MM3 (ref 140–450)
PMV BLD AUTO: 10.6 FL (ref 6–12)
POTASSIUM BLD-SCNC: 3.4 MMOL/L (ref 3.5–5.2)
PROTHROMBIN TIME: 14.6 SECONDS (ref 12.1–15)
RBC # BLD AUTO: 4.35 10*6/MM3 (ref 3.77–5.28)
RV RNA STL NAA+PROBE: NOT DETECTED
SALMONELLA DNA SPEC QL NAA+PROBE: NOT DETECTED
SAPO I+II+IV+V RNA STL QL NAA+NON-PROBE: NOT DETECTED
SHIGELLA SP+EIEC IPAH STL QL NAA+PROBE: NOT DETECTED
SODIUM BLD-SCNC: 141 MMOL/L (ref 136–145)
TRIGL SERPL-MCNC: 57 MG/DL (ref 0–150)
TROPONIN T SERPL-MCNC: <0.01 NG/ML (ref 0–0.03)
V CHOLERAE DNA SPEC QL NAA+PROBE: NOT DETECTED
VIBRIO DNA SPEC NAA+PROBE: NOT DETECTED
VLDLC SERPL-MCNC: 11.4 MG/DL (ref 7–27)
WBC NRBC COR # BLD: 7 10*3/MM3 (ref 3.4–10.8)
YERSINIA STL CULT: NOT DETECTED

## 2019-05-09 PROCEDURE — 87507 IADNA-DNA/RNA PROBE TQ 12-25: CPT | Performed by: INTERNAL MEDICINE

## 2019-05-09 PROCEDURE — 96361 HYDRATE IV INFUSION ADD-ON: CPT

## 2019-05-09 PROCEDURE — 80061 LIPID PANEL: CPT | Performed by: INTERNAL MEDICINE

## 2019-05-09 PROCEDURE — 94799 UNLISTED PULMONARY SVC/PX: CPT

## 2019-05-09 PROCEDURE — 80048 BASIC METABOLIC PNL TOTAL CA: CPT | Performed by: INTERNAL MEDICINE

## 2019-05-09 PROCEDURE — 84484 ASSAY OF TROPONIN QUANT: CPT | Performed by: INTERNAL MEDICINE

## 2019-05-09 PROCEDURE — 85025 COMPLETE CBC W/AUTO DIFF WBC: CPT | Performed by: INTERNAL MEDICINE

## 2019-05-09 PROCEDURE — 99214 OFFICE O/P EST MOD 30 MIN: CPT | Performed by: INTERNAL MEDICINE

## 2019-05-09 PROCEDURE — 85610 PROTHROMBIN TIME: CPT | Performed by: INTERNAL MEDICINE

## 2019-05-09 PROCEDURE — 93010 ELECTROCARDIOGRAM REPORT: CPT | Performed by: INTERNAL MEDICINE

## 2019-05-09 PROCEDURE — 99213 OFFICE O/P EST LOW 20 MIN: CPT | Performed by: INTERNAL MEDICINE

## 2019-05-09 PROCEDURE — G0378 HOSPITAL OBSERVATION PER HR: HCPCS

## 2019-05-09 RX ORDER — LOPERAMIDE HYDROCHLORIDE 2 MG/1
2 CAPSULE ORAL
Status: DISCONTINUED | OUTPATIENT
Start: 2019-05-09 | End: 2019-05-09

## 2019-05-09 RX ORDER — LOPERAMIDE HYDROCHLORIDE 2 MG/1
2 CAPSULE ORAL
Status: DISCONTINUED | OUTPATIENT
Start: 2019-05-09 | End: 2019-05-10 | Stop reason: HOSPADM

## 2019-05-09 RX ORDER — LISINOPRIL 10 MG/1
10 TABLET ORAL
Status: DISCONTINUED | OUTPATIENT
Start: 2019-05-09 | End: 2019-05-10 | Stop reason: HOSPADM

## 2019-05-09 RX ADMIN — SODIUM CHLORIDE, PRESERVATIVE FREE 3 ML: 5 INJECTION INTRAVENOUS at 20:44

## 2019-05-09 RX ADMIN — SODIUM CHLORIDE 75 ML/HR: 9 INJECTION, SOLUTION INTRAVENOUS at 00:16

## 2019-05-09 RX ADMIN — LOPERAMIDE HYDROCHLORIDE 2 MG: 2 CAPSULE ORAL at 05:49

## 2019-05-09 RX ADMIN — LOPERAMIDE HYDROCHLORIDE 2 MG: 2 CAPSULE ORAL at 11:54

## 2019-05-09 RX ADMIN — ATORVASTATIN CALCIUM 40 MG: 40 TABLET, FILM COATED ORAL at 08:03

## 2019-05-09 RX ADMIN — LISINOPRIL 10 MG: 10 TABLET ORAL at 11:25

## 2019-05-09 RX ADMIN — ASPIRIN 81 MG: 81 TABLET ORAL at 08:03

## 2019-05-09 RX ADMIN — LOPERAMIDE HYDROCHLORIDE 2 MG: 2 CAPSULE ORAL at 08:09

## 2019-05-09 RX ADMIN — SODIUM CHLORIDE 150 ML/HR: 9 INJECTION, SOLUTION INTRAVENOUS at 13:37

## 2019-05-09 RX ADMIN — METOPROLOL TARTRATE 12.5 MG: 25 TABLET, FILM COATED ORAL at 08:03

## 2019-05-09 RX ADMIN — ONDANSETRON 4 MG: 4 TABLET, FILM COATED ORAL at 00:36

## 2019-05-09 RX ADMIN — PANTOPRAZOLE SODIUM 40 MG: 40 TABLET, DELAYED RELEASE ORAL at 05:49

## 2019-05-09 RX ADMIN — SODIUM CHLORIDE 150 ML/HR: 9 INJECTION, SOLUTION INTRAVENOUS at 20:45

## 2019-05-09 NOTE — PROGRESS NOTES
LOS: 0 days   Patient Care Team:  Shan Collier MD as PCP - General (Family Medicine)    Chief Complaint: Follow-up for chest pain.    Interval History: Has developed severe diarrhea.  No further chest pain.  No abdominal pain.  No shortness of breath.    Vital Signs:  Temp:  [97.9 °F (36.6 °C)-101.1 °F (38.4 °C)] 98 °F (36.7 °C)  Heart Rate:  [] 77  Resp:  [16-18] 18  BP: (109-137)/() 128/70    Intake/Output Summary (Last 24 hours) at 5/9/2019 0818  Last data filed at 5/8/2019 1759  Gross per 24 hour   Intake 980 ml   Output --   Net 980 ml       Physical Exam:   General Appearance:    No acute distress, alert and oriented x4   Lungs:     Clear to auscultation bilaterally     Heart:    Regular rhythm and normal rate.  No murmurs, gallops, or       rubs.   Abdomen:     Soft, non-tender, non-distended.    Extremities:   Moves all extremities well.  No clubbing, cyanosis, or edema.     Results Review:    Results from last 7 days   Lab Units 05/09/19  0559   SODIUM mmol/L 141   POTASSIUM mmol/L 3.4*   CHLORIDE mmol/L 105   CO2 mmol/L 21.6*   BUN mg/dL 20   CREATININE mg/dL 0.71   GLUCOSE mg/dL 100*   CALCIUM mg/dL 8.4*     Results from last 7 days   Lab Units 05/09/19  0559 05/08/19  1717 05/08/19  1131   TROPONIN T ng/mL <0.010 <0.010 <0.010     Results from last 7 days   Lab Units 05/09/19  0559   WBC 10*3/mm3 7.00   HEMOGLOBIN g/dL 13.3   HEMATOCRIT % 39.7   PLATELETS 10*3/mm3 169     Results from last 7 days   Lab Units 05/09/19  0559   INR  1.17*     Results from last 7 days   Lab Units 05/09/19  0559   CHOLESTEROL mg/dL 129     Results from last 7 days   Lab Units 05/08/19  1717   MAGNESIUM mg/dL 1.7     Results from last 7 days   Lab Units 05/09/19  0559   CHOLESTEROL mg/dL 129   TRIGLYCERIDES mg/dL 57   HDL CHOL mg/dL 61*   LDL CHOL mg/dL 57       I reviewed the patient's new clinical results.        Assessment:  1. Chest pain and pressure  2. Diarrhea   3. Hypertension  4.  Hyperlipidemia  5. Migraine headache    Plan:  -No further chest pain - diarrhea is now the main issue.  Possibly all GI related?    -Echo looked ok.  Treadmill stress test was normal.  She was stopped after achieving target heart rate - she told me she had no chest pain or shortness of breath during the test.  I suspect her exercise tolerance is normal.    -At this point, I do not feel she needs further cardiac testing.  She does not need a heart cath.      -I stopped the metoprolol and restarted her home lisinopril.  I discontinued the Nitropaste.    -From a cardiac standpoint, she is stable for discharge.  Follow-up with Dr. Elizondo as needed.  Advised her to come back immediately for any chest pain or pressure in the future.    Thanks     Praful Smith MD  05/09/19  8:18 AM

## 2019-05-09 NOTE — NURSING NOTE
"Pt on nitro paste q 6hrs.  Day shift nurse notified me that when pt had nitro in ER she went bradycardic and became sick/vomiting.    Day shift nurse said MD okay with pt not receiving nitro if no chest pain.    Pt denies chest pain just stating that she \"just feels bad.\"    Yanna Blackwell RN  "

## 2019-05-09 NOTE — PROGRESS NOTES
"SERVICE: [facility] HOSPITALIST    CONSULTANTS:  Cardiology-signed off    CHIEF COMPLAINT: Diarrhea    SUBJECTIVE:    Patient was due to be discharged today, after evaluation by cardiology.  However she developed a diffuse watery non-bloody diarrhea overnight.  She is not able to go more than 5 to 10 minutes between bowel movements.  She has had well over 30 bowel movements since this developed in the middle of night last night.  With fecal urgency.  No foreign travel.  Her and her  did eat out at [employer] together about 3 or 4 days ago.  But her and her  usually do not eat the same foods.  Her  developed symptoms about 6 hours before the patient.  She denies any recent antibiotics in the last 3 months.  Has not had any nausea or vomiting since being given nitro in the ER yesterday.  Denies melena, but does have scant bright red blood per rectum exhibited by small some spots of blood on the tissue paper, that patient feels is related to the number of times she is already had a bowel movement today.    Patient denies any abdominal pain, and no fever and chills.    Review of systems is negative for skin rash, or headache.    OBJECTIVE:    /75 (BP Location: Right arm, Patient Position: Lying)   Pulse 75   Temp 98.3 °F (36.8 °C) (Oral)   Resp 18   Ht 162.6 cm (64\")   Wt 75.9 kg (167 lb 7 oz)   SpO2 100%   BMI 28.74 kg/m²     MEDS/LABS REVIEWED AND ORDERED      aspirin 81 mg Oral Daily   atorvastatin 40 mg Oral Daily   enoxaparin 40 mg Subcutaneous Q24H   lisinopril 10 mg Oral Q24H   pantoprazole 40 mg Oral QAM   sodium chloride 3 mL Intravenous Q12H       Physical Exam   Constitutional: She is oriented to person, place, and time. She appears well-developed and well-nourished. No distress.   Eyes: Pupils are equal, round, and reactive to light.   Cardiovascular: Normal rate, regular rhythm and normal heart sounds. Exam reveals no friction rub.   No murmur " heard.  Pulmonary/Chest: Effort normal and breath sounds normal. No stridor. No respiratory distress. She has no wheezes. She has no rales.   Abdominal: Soft. She exhibits no distension. There is no tenderness. There is no rebound and no guarding.   Hyperactive bowel sounds   Musculoskeletal: She exhibits no edema.   Neurological: She is alert and oriented to person, place, and time.   Skin: Skin is warm and dry. She is not diaphoretic. No erythema.   Nursing note and vitals reviewed.      LAB/DIAGNOSTICS:    Lab Results (last 24 hours)     Procedure Component Value Units Date/Time    Gastrointestinal Panel, PCR - Stool, Per Rectum [[id number]] Collected:  05/09/19 1154    Specimen:  Stool from Per Rectum Updated:  05/09/19 1200    Troponin [[id number]]  (Normal) Collected:  05/09/19 0559    Specimen:  Blood Updated:  05/09/19 0646     Troponin T <0.010 ng/mL     Narrative:       Troponin T Reference Range:  <= 0.03 ng/mL-   Negative for AMI  >0.03 ng/mL-     Abnormal for myocardial necrosis.  Clinicians would have to utilize clinical acumen, EKG, Troponin and serial changes to determine if it is an Acute Myocardial Infarction or myocardial injury due to an underlying chronic condition.     Basic Metabolic Panel [[id number]]  (Abnormal) Collected:  05/09/19 0559    Specimen:  Blood Updated:  05/09/19 0644     Glucose 100 mg/dL      BUN 20 mg/dL      Creatinine 0.71 mg/dL      Sodium 141 mmol/L      Potassium 3.4 mmol/L      Chloride 105 mmol/L      CO2 21.6 mmol/L      Calcium 8.4 mg/dL      eGFR Non African Amer 83 mL/min/1.73      BUN/Creatinine Ratio 28.2     Anion Gap 14.4 mmol/L     Narrative:       GFR Normal >60  Chronic Kidney Disease <60  Kidney Failure <15    Lipid Panel [[id number]]  (Abnormal) Collected:  05/09/19 0559    Specimen:  Blood Updated:  05/09/19 0644     Total Cholesterol 129 mg/dL      Triglycerides 57 mg/dL      HDL Cholesterol 61 mg/dL      LDL Cholesterol  57 mg/dL      VLDL Cholesterol 11.4  mg/dL      LDL/HDL Ratio 0.93    Narrative:       Cholesterol Reference Ranges  (U.S. Department of Health and Human Services ATP III Classifications)    Desirable          <200 mg/dL  Borderline High    200-239 mg/dL  High Risk          >240 mg/dL      Triglyceride Reference Ranges  (U.S. Department of Health and Human Services ATP III Classifications)    Normal           <150 mg/dL  Borderline High  150-199 mg/dL  High             200-499 mg/dL  Very High        >500 mg/dL    HDL Reference Ranges  (U.S. Department of Health and Human Services ATP III Classifcations)    Low     <40 mg/dl (major risk factor for CHD)  High    >60 mg/dl ('negative' risk factor for CHD)        LDL Reference Ranges  (U.S. Department of Health and Human Services ATP III Classifcations)    Optimal          <100 mg/dL  Near Optimal     100-129 mg/dL  Borderline High  130-159 mg/dL  High             160-189 mg/dL  Very High        >189 mg/dL    Protime-INR [[id number]]  (Abnormal) Collected:  05/09/19 0559    Specimen:  Blood Updated:  05/09/19 0632     Protime 14.6 Seconds      INR 1.17    Narrative:       Therapeutic Ranges for INR: 2.0-3.0 (PT 20-30)                              2.5-3.5 (PT 25-34)    CBC & Differential [[id number]] Collected:  05/09/19 0559    Specimen:  Blood Updated:  05/09/19 0622    Narrative:       The following orders were created for panel order CBC & Differential.  Procedure                               Abnormality         Status                     ---------                               -----------         ------                     CBC Auto Differential[[id number]]        Abnormal            Final result                 Please view results for these tests on the individual orders.    CBC Auto Differential [[id number]]  (Abnormal) Collected:  05/09/19 0559    Specimen:  Blood Updated:  05/09/19 0622     WBC 7.00 10*3/mm3      RBC 4.35 10*6/mm3      Hemoglobin 13.3 g/dL      Hematocrit 39.7 %      MCV 91.3 fL       MCH 30.6 pg      MCHC 33.5 g/dL      RDW 12.9 %      RDW-SD 43.1 fl      MPV 10.6 fL      Platelets 169 10*3/mm3      Neutrophil % 80.2 %      Lymphocyte % 10.9 %      Monocyte % 7.3 %      Eosinophil % 0.9 %      Basophil % 0.3 %      Immature Grans % 0.4 %      Neutrophils, Absolute 5.62 10*3/mm3      Lymphocytes, Absolute 0.76 10*3/mm3      Monocytes, Absolute 0.51 10*3/mm3      Eosinophils, Absolute 0.06 10*3/mm3      Basophils, Absolute 0.02 10*3/mm3      Immature Grans, Absolute 0.03 10*3/mm3      nRBC 0.0 /100 WBC     Magnesium [[id number]]  (Normal) Collected:  05/08/19 1717    Specimen:  Blood Updated:  05/09/19 0024     Magnesium 1.7 mg/dL     TSH [[id number]]  (Normal) Collected:  05/08/19 1717    Specimen:  Blood Updated:  05/08/19 1903     TSH 1.490 mIU/mL     Hemoglobin A1c [[id number]]  (Normal) Collected:  05/08/19 0830    Specimen:  Blood Updated:  05/08/19 1901     Hemoglobin A1C 5.00 %     Narrative:       Hemoglobin A1C Ranges:    Increased Risk for Diabetes  5.7% to 6.4%  Diabetes                     >= 6.5%  Diabetic Goal                < 7.0%        Results for orders placed during the hospital encounter of 05/08/19   Adult Transthoracic Echo Complete W/ Cont if Necessary Per Protocol    Narrative · Left ventricular systolic function is normal. Calculated EF = 60.0%.   Estimated EF was in agreement with the calculated EF. Normal left   ventricular cavity size and wall thickness noted. All left ventricular   wall segments contract normally. Left ventricular diastolic dysfunction is   noted (grade I) consistent with impaired relaxation.        Xr Chest 2 View    Result Date: 5/8/2019  No active pulmonary disease.  This report was finalized on 5/8/2019 9:33 AM by Dr. Sergei Floyd MD.          ASSESSMENT/PLAN:    Acute diarrhea due to acute norovirus  -GI panel has returned as positive for norovirus  -Patient had been n.p.o. for possible stress test, advance diet to clears, increase fluids to 150  cc/h from the 75 cc being given for maintenance.  -Continue Lomotil, patient is low risk for C. difficile  -Otherwise supportive care  -Anticipate discharge in a.m., patient has frequent enough bowel movements that she would not be able to get to her car without very likely accidentally soiling herself    Chest pain rule out  -Stress test was adequate, protocol was terminated after patient achieved goal heart rate, per Dr. Smith consistent with a low risk study, does not need any further ischemic work-up at this time  -Given development of diarrhea, chest pressure and nausea likely related to developing GI illness  Discontinue Nitropaste, cardiology recommends follow-up with Dr. Elizondo as needed, and return to the ER immediately if she develops any further chest pain or pressure in the future    HTN  -Metoprolol started for ACS discontinued by cardiology, restarted home lisinopril  -Systolic blood pressures have been 110s to 120s today, in the setting of diffuse GI fluid losses

## 2019-05-09 NOTE — PLAN OF CARE
Problem: Patient Care Overview  Goal: Discharge Needs Assessment  Outcome: Ongoing (interventions implemented as appropriate)   05/09/19 1416   Disability   Equipment Currently Used at Home none   Discharge Needs Assessment   Readmission Within the Last 30 Days no previous admission in last 30 days   Concerns to be Addressed no discharge needs identified   Patient/Family Anticipates Transition to home   Patient/Family Anticipated Services at Transition none   Transportation Concerns car, none   Transportation Anticipated family or friend will provide;car, drives self

## 2019-05-09 NOTE — NURSING NOTE
Discharge Planning Assessment   Yaima Eaton     Patient Name: Christiane Smith  MRN: [id number]  Today's Date: 5/9/2019    Admit Date: 5/8/2019    Discharge Needs Assessment     Row Name 05/09/19 1416       Living Environment    Lives With  spouse    Current Living Arrangements  home/apartment/condo    Primary Care Provided by  self    Provides Primary Care For  no one    Family Caregiver if Needed  spouse    Quality of Family Relationships  involved;supportive    Able to Return to Prior Arrangements  yes       Resource/Environmental Concerns    Resource/Environmental Concerns  none    Transportation Concerns  car, none       Transition Planning    Patient/Family Anticipates Transition to  home    Patient/Family Anticipated Services at Transition  none    Transportation Anticipated  family or friend will provide;car, drives self       Discharge Needs Assessment    Readmission Within the Last 30 Days  no previous admission in last 30 days    Concerns to be Addressed  no discharge needs identified    Equipment Currently Used at Home  none        Discharge Plan     Row Name 05/09/19 1417       Plan    Plan  plan home, no needs noted    Patient/Family in Agreement with Plan  yes    Plan Comments  Spoke with patient at bedside, face sheet verified. Patient states she lives in a home with her  and is independent of ADLs includig driving prior to admission. She denies use od DME, home 02, cpap/bipap.  She has not used home health or rehab services previously. She does not have a living will and declines additional inforamtion at this time. She uses [facility] and [facility] issues obtaining medications. She does not anticipate any needs at [place]. CM # placed on white board, will continue to follow.        Destination      No service coordination in this encounter.      Durable Medical Equipment      No service coordination in this encounter.      Dialysis/Infusion      No service coordination in this encounter.       Home Medical Care      No service coordination in this encounter.      Therapy      No service coordination in this encounter.      Community Resources      No service coordination in this encounter.          Demographic Summary     Row Name 05/09/19 1416       General Information    Admission Type  observation    Arrived From  home    Referral Source  admission list    Reason for Consult  discharge planning    Preferred Language  English     Used During This Interaction  no       Contact Information    Permission Granted to Share Info With          Functional Status    No documentation.       Psychosocial    No documentation.       Abuse/Neglect    No documentation.       Legal    No documentation.       Substance Abuse    No documentation.       Patient Forms    No documentation.           Edmundo Rodriguez RN

## 2019-05-10 ENCOUNTER — APPOINTMENT (OUTPATIENT)
Dept: CT IMAGING | Facility: HOSPITAL | Age: 64
End: 2019-05-10

## 2019-05-10 ENCOUNTER — HOSPITAL ENCOUNTER (EMERGENCY)
Facility: HOSPITAL | Age: 64
Discharge: HOME | End: 2019-05-11
Attending: EMERGENCY MEDICINE | Admitting: EMERGENCY MEDICINE

## 2019-05-10 VITALS
DIASTOLIC BLOOD PRESSURE: 59 MMHG | BODY MASS INDEX: 28.59 KG/M2 | OXYGEN SATURATION: 96 % | RESPIRATION RATE: 18 BRPM | HEIGHT: 64 IN | WEIGHT: 167.44 LBS | SYSTOLIC BLOOD PRESSURE: 105 MMHG | TEMPERATURE: 97.1 F | HEART RATE: 68 BPM

## 2019-05-10 DIAGNOSIS — N20.0 KIDNEY STONE ON LEFT SIDE: Primary | ICD-10-CM

## 2019-05-10 PROBLEM — K21.9 GERD (GASTROESOPHAGEAL REFLUX DISEASE): Status: ACTIVE | Noted: 2019-05-10

## 2019-05-10 PROBLEM — R07.9 CHEST PAIN: Status: RESOLVED | Noted: 2019-05-08 | Resolved: 2019-05-10

## 2019-05-10 PROBLEM — I10 HYPERTENSION: Status: ACTIVE | Noted: 2019-05-10

## 2019-05-10 PROBLEM — A08.11 GASTROENTERITIS DUE TO NOROVIRUS: Status: ACTIVE | Noted: 2019-05-10

## 2019-05-10 LAB
ALBUMIN SERPL-MCNC: 3.2 G/DL (ref 3.5–5.2)
ALBUMIN SERPL-MCNC: 4 G/DL (ref 3.5–5.2)
ALBUMIN/GLOB SERPL: 1.4 G/DL
ALBUMIN/GLOB SERPL: 1.5 G/DL
ALP SERPL-CCNC: 37 U/L (ref 39–117)
ALP SERPL-CCNC: 47 U/L (ref 39–117)
ALT SERPL W P-5'-P-CCNC: 17 U/L (ref 1–33)
ALT SERPL W P-5'-P-CCNC: 22 U/L (ref 1–33)
ANION GAP SERPL CALCULATED.3IONS-SCNC: 11.6 MMOL/L
ANION GAP SERPL CALCULATED.3IONS-SCNC: 17.1 MMOL/L
AST SERPL-CCNC: 19 U/L (ref 1–32)
AST SERPL-CCNC: 25 U/L (ref 1–32)
BACTERIA UR QL AUTO: ABNORMAL /HPF
BASOPHILS # BLD AUTO: 0.03 10*3/MM3 (ref 0–0.2)
BASOPHILS # BLD AUTO: 0.03 10*3/MM3 (ref 0–0.2)
BASOPHILS NFR BLD AUTO: 0.3 % (ref 0–1.5)
BASOPHILS NFR BLD AUTO: 0.6 % (ref 0–1.5)
BILIRUB SERPL-MCNC: 0.3 MG/DL (ref 0.2–1.2)
BILIRUB SERPL-MCNC: 0.3 MG/DL (ref 0.2–1.2)
BILIRUB UR QL STRIP: NEGATIVE
BUN BLD-MCNC: 10 MG/DL (ref 8–23)
BUN BLD-MCNC: 8 MG/DL (ref 8–23)
BUN/CREAT SERPL: 13 (ref 7–25)
BUN/CREAT SERPL: 14.3 (ref 7–25)
CALCIUM SPEC-SCNC: 8 MG/DL (ref 8.6–10.5)
CALCIUM SPEC-SCNC: 9 MG/DL (ref 8.6–10.5)
CHLORIDE SERPL-SCNC: 103 MMOL/L (ref 98–107)
CHLORIDE SERPL-SCNC: 109 MMOL/L (ref 98–107)
CLARITY UR: ABNORMAL
CO2 SERPL-SCNC: 17.9 MMOL/L (ref 22–29)
CO2 SERPL-SCNC: 20.4 MMOL/L (ref 22–29)
COLOR UR: YELLOW
CREAT BLD-MCNC: 0.56 MG/DL (ref 0.57–1)
CREAT BLD-MCNC: 0.77 MG/DL (ref 0.57–1)
DEPRECATED RDW RBC AUTO: 42 FL (ref 37–54)
DEPRECATED RDW RBC AUTO: 43.7 FL (ref 37–54)
EOSINOPHIL # BLD AUTO: 0.02 10*3/MM3 (ref 0–0.4)
EOSINOPHIL # BLD AUTO: 0.12 10*3/MM3 (ref 0–0.4)
EOSINOPHIL NFR BLD AUTO: 0.2 % (ref 0.3–6.2)
EOSINOPHIL NFR BLD AUTO: 2.6 % (ref 0.3–6.2)
ERYTHROCYTE [DISTWIDTH] IN BLOOD BY AUTOMATED COUNT: 12.9 % (ref 12.3–15.4)
ERYTHROCYTE [DISTWIDTH] IN BLOOD BY AUTOMATED COUNT: 13.1 % (ref 12.3–15.4)
GFR SERPL CREATININE-BSD FRML MDRD: 109 ML/MIN/1.73
GFR SERPL CREATININE-BSD FRML MDRD: 75 ML/MIN/1.73
GLOBULIN UR ELPH-MCNC: 2.3 GM/DL
GLOBULIN UR ELPH-MCNC: 2.7 GM/DL
GLUCOSE BLD-MCNC: 125 MG/DL (ref 65–99)
GLUCOSE BLD-MCNC: 92 MG/DL (ref 65–99)
GLUCOSE UR STRIP-MCNC: NEGATIVE MG/DL
HCT VFR BLD AUTO: 34.4 % (ref 34–46.6)
HCT VFR BLD AUTO: 36.5 % (ref 34–46.6)
HGB BLD-MCNC: 11.6 G/DL (ref 12–15.9)
HGB BLD-MCNC: 12.7 G/DL (ref 12–15.9)
HGB UR QL STRIP.AUTO: ABNORMAL
HYALINE CASTS UR QL AUTO: ABNORMAL /LPF
IMM GRANULOCYTES # BLD AUTO: 0.01 10*3/MM3 (ref 0–0.05)
IMM GRANULOCYTES # BLD AUTO: 0.05 10*3/MM3 (ref 0–0.05)
IMM GRANULOCYTES NFR BLD AUTO: 0.2 % (ref 0–0.5)
IMM GRANULOCYTES NFR BLD AUTO: 0.6 % (ref 0–0.5)
KETONES UR QL STRIP: ABNORMAL
LEUKOCYTE ESTERASE UR QL STRIP.AUTO: NEGATIVE
LYMPHOCYTES # BLD AUTO: 1.27 10*3/MM3 (ref 0.7–3.1)
LYMPHOCYTES # BLD AUTO: 1.72 10*3/MM3 (ref 0.7–3.1)
LYMPHOCYTES NFR BLD AUTO: 14.4 % (ref 19.6–45.3)
LYMPHOCYTES NFR BLD AUTO: 36.9 % (ref 19.6–45.3)
MAGNESIUM SERPL-MCNC: 1.6 MG/DL (ref 1.6–2.4)
MCH RBC QN AUTO: 31 PG (ref 26.6–33)
MCH RBC QN AUTO: 31 PG (ref 26.6–33)
MCHC RBC AUTO-ENTMCNC: 33.7 G/DL (ref 31.5–35.7)
MCHC RBC AUTO-ENTMCNC: 34.8 G/DL (ref 31.5–35.7)
MCV RBC AUTO: 89 FL (ref 79–97)
MCV RBC AUTO: 92 FL (ref 79–97)
MONOCYTES # BLD AUTO: 0.56 10*3/MM3 (ref 0.1–0.9)
MONOCYTES # BLD AUTO: 0.65 10*3/MM3 (ref 0.1–0.9)
MONOCYTES NFR BLD AUTO: 12 % (ref 5–12)
MONOCYTES NFR BLD AUTO: 7.4 % (ref 5–12)
NEUTROPHILS # BLD AUTO: 2.22 10*3/MM3 (ref 1.7–7)
NEUTROPHILS # BLD AUTO: 6.79 10*3/MM3 (ref 1.7–7)
NEUTROPHILS NFR BLD AUTO: 47.7 % (ref 42.7–76)
NEUTROPHILS NFR BLD AUTO: 77.1 % (ref 42.7–76)
NITRITE UR QL STRIP: NEGATIVE
NRBC BLD AUTO-RTO: 0 /100 WBC (ref 0–0.2)
NRBC BLD AUTO-RTO: 0 /100 WBC (ref 0–0.2)
PH UR STRIP.AUTO: 7 [PH] (ref 4.5–8)
PLATELET # BLD AUTO: 152 10*3/MM3 (ref 140–450)
PLATELET # BLD AUTO: 187 10*3/MM3 (ref 140–450)
PMV BLD AUTO: 10.1 FL (ref 6–12)
PMV BLD AUTO: 10.3 FL (ref 6–12)
POTASSIUM BLD-SCNC: 3 MMOL/L (ref 3.5–5.2)
POTASSIUM BLD-SCNC: 3.4 MMOL/L (ref 3.5–5.2)
PROT SERPL-MCNC: 5.5 G/DL (ref 6–8.5)
PROT SERPL-MCNC: 6.7 G/DL (ref 6–8.5)
PROT UR QL STRIP: NEGATIVE
RBC # BLD AUTO: 3.74 10*6/MM3 (ref 3.77–5.28)
RBC # BLD AUTO: 4.1 10*6/MM3 (ref 3.77–5.28)
RBC # UR: ABNORMAL /HPF
REF LAB TEST METHOD: ABNORMAL
SODIUM BLD-SCNC: 138 MMOL/L (ref 136–145)
SODIUM BLD-SCNC: 141 MMOL/L (ref 136–145)
SP GR UR STRIP: 1.01 (ref 1–1.03)
SQUAMOUS #/AREA URNS HPF: ABNORMAL /HPF
UROBILINOGEN UR QL STRIP: ABNORMAL
WBC NRBC COR # BLD: 4.66 10*3/MM3 (ref 3.4–10.8)
WBC NRBC COR # BLD: 8.81 10*3/MM3 (ref 3.4–10.8)
WBC UR QL AUTO: ABNORMAL /HPF

## 2019-05-10 PROCEDURE — 96361 HYDRATE IV INFUSION ADD-ON: CPT

## 2019-05-10 PROCEDURE — G0378 HOSPITAL OBSERVATION PER HR: HCPCS

## 2019-05-10 PROCEDURE — 99284 EMERGENCY DEPT VISIT MOD MDM: CPT | Performed by: EMERGENCY MEDICINE

## 2019-05-10 PROCEDURE — 96375 TX/PRO/DX INJ NEW DRUG ADDON: CPT

## 2019-05-10 PROCEDURE — 74176 CT ABD & PELVIS W/O CONTRAST: CPT

## 2019-05-10 PROCEDURE — 85025 COMPLETE CBC W/AUTO DIFF WBC: CPT | Performed by: EMERGENCY MEDICINE

## 2019-05-10 PROCEDURE — 96374 THER/PROPH/DIAG INJ IV PUSH: CPT

## 2019-05-10 PROCEDURE — 99284 EMERGENCY DEPT VISIT MOD MDM: CPT

## 2019-05-10 PROCEDURE — 85025 COMPLETE CBC W/AUTO DIFF WBC: CPT | Performed by: INTERNAL MEDICINE

## 2019-05-10 PROCEDURE — 83735 ASSAY OF MAGNESIUM: CPT | Performed by: INTERNAL MEDICINE

## 2019-05-10 PROCEDURE — 25010000002 KETOROLAC TROMETHAMINE PER 15 MG: Performed by: EMERGENCY MEDICINE

## 2019-05-10 PROCEDURE — 96376 TX/PRO/DX INJ SAME DRUG ADON: CPT

## 2019-05-10 PROCEDURE — 80053 COMPREHEN METABOLIC PANEL: CPT | Performed by: EMERGENCY MEDICINE

## 2019-05-10 PROCEDURE — 80053 COMPREHEN METABOLIC PANEL: CPT | Performed by: INTERNAL MEDICINE

## 2019-05-10 PROCEDURE — 25010000002 FENTANYL CITRATE (PF) 100 MCG/2ML SOLUTION: Performed by: EMERGENCY MEDICINE

## 2019-05-10 PROCEDURE — 25010000002 ONDANSETRON PER 1 MG: Performed by: EMERGENCY MEDICINE

## 2019-05-10 PROCEDURE — 25010000002 HYDROMORPHONE PER 4 MG: Performed by: EMERGENCY MEDICINE

## 2019-05-10 PROCEDURE — 81001 URINALYSIS AUTO W/SCOPE: CPT | Performed by: EMERGENCY MEDICINE

## 2019-05-10 PROCEDURE — 99217 PR OBSERVATION CARE DISCHARGE MANAGEMENT: CPT | Performed by: INTERNAL MEDICINE

## 2019-05-10 RX ORDER — SODIUM CHLORIDE 0.9 % (FLUSH) 0.9 %
10 SYRINGE (ML) INJECTION AS NEEDED
Status: DISCONTINUED | OUTPATIENT
Start: 2019-05-10 | End: 2019-05-11 | Stop reason: HOSPADM

## 2019-05-10 RX ORDER — FENTANYL CITRATE 50 UG/ML
50 INJECTION, SOLUTION INTRAMUSCULAR; INTRAVENOUS ONCE
Status: COMPLETED | OUTPATIENT
Start: 2019-05-10 | End: 2019-05-10

## 2019-05-10 RX ORDER — OXYCODONE AND ACETAMINOPHEN 10; 325 MG/1; MG/1
1 TABLET ORAL EVERY 4 HOURS PRN
Qty: 25 TABLET | Refills: 0 | Status: SHIPPED | OUTPATIENT
Start: 2019-05-10 | End: 2020-10-22

## 2019-05-10 RX ORDER — POTASSIUM CHLORIDE 20 MEQ/1
40 TABLET, EXTENDED RELEASE ORAL ONCE
Status: COMPLETED | OUTPATIENT
Start: 2019-05-10 | End: 2019-05-10

## 2019-05-10 RX ORDER — ONDANSETRON 2 MG/ML
4 INJECTION INTRAMUSCULAR; INTRAVENOUS ONCE
Status: COMPLETED | OUTPATIENT
Start: 2019-05-10 | End: 2019-05-10

## 2019-05-10 RX ORDER — ONDANSETRON 4 MG/1
4 TABLET, ORALLY DISINTEGRATING ORAL EVERY 6 HOURS PRN
Qty: 20 TABLET | Refills: 0 | Status: SHIPPED | OUTPATIENT
Start: 2019-05-10 | End: 2020-10-22

## 2019-05-10 RX ORDER — ASPIRIN 81 MG/1
81 TABLET ORAL DAILY
Qty: 30 TABLET | Refills: 0 | Status: SHIPPED | OUTPATIENT
Start: 2019-05-10

## 2019-05-10 RX ORDER — KETOROLAC TROMETHAMINE 30 MG/ML
15 INJECTION, SOLUTION INTRAMUSCULAR; INTRAVENOUS ONCE
Status: COMPLETED | OUTPATIENT
Start: 2019-05-10 | End: 2019-05-10

## 2019-05-10 RX ORDER — HYDROMORPHONE HCL 110MG/55ML
1 PATIENT CONTROLLED ANALGESIA SYRINGE INTRAVENOUS ONCE
Status: COMPLETED | OUTPATIENT
Start: 2019-05-10 | End: 2019-05-10

## 2019-05-10 RX ADMIN — PANTOPRAZOLE SODIUM 40 MG: 40 TABLET, DELAYED RELEASE ORAL at 06:26

## 2019-05-10 RX ADMIN — HYDROMORPHONE HYDROCHLORIDE 1 MG: 2 INJECTION, SOLUTION INTRAMUSCULAR; INTRAVENOUS; SUBCUTANEOUS at 21:34

## 2019-05-10 RX ADMIN — ASPIRIN 81 MG: 81 TABLET ORAL at 09:28

## 2019-05-10 RX ADMIN — ATORVASTATIN CALCIUM 40 MG: 40 TABLET, FILM COATED ORAL at 09:28

## 2019-05-10 RX ADMIN — HYDROMORPHONE HYDROCHLORIDE 1 MG: 2 INJECTION, SOLUTION INTRAMUSCULAR; INTRAVENOUS; SUBCUTANEOUS at 22:13

## 2019-05-10 RX ADMIN — SODIUM CHLORIDE 150 ML/HR: 9 INJECTION, SOLUTION INTRAVENOUS at 03:35

## 2019-05-10 RX ADMIN — FENTANYL CITRATE 50 MCG: 50 INJECTION INTRAMUSCULAR; INTRAVENOUS at 23:03

## 2019-05-10 RX ADMIN — POTASSIUM CHLORIDE 40 MEQ: 1500 TABLET, EXTENDED RELEASE ORAL at 09:35

## 2019-05-10 RX ADMIN — KETOROLAC TROMETHAMINE 15 MG: 30 INJECTION, SOLUTION INTRAMUSCULAR at 21:29

## 2019-05-10 RX ADMIN — ACETAMINOPHEN 650 MG: 325 TABLET, FILM COATED ORAL at 00:14

## 2019-05-10 RX ADMIN — ONDANSETRON 4 MG: 2 INJECTION, SOLUTION INTRAMUSCULAR; INTRAVENOUS at 21:32

## 2019-05-10 RX ADMIN — SODIUM CHLORIDE, PRESERVATIVE FREE 10 ML: 5 INJECTION INTRAVENOUS at 09:20

## 2019-05-10 RX ADMIN — SODIUM CHLORIDE 1000 ML: 9 INJECTION, SOLUTION INTRAVENOUS at 22:11

## 2019-05-10 NOTE — NURSING NOTE
PATIENT DISCHARGE COMPLETE. AVS REVIEWED, MATERIALS GIVEN. PATIENT WAITING ON FAMILY TO ARRIVE AT 12:30PM

## 2019-05-10 NOTE — PLAN OF CARE
Problem: Patient Care Overview  Goal: Plan of Care Review  Outcome: Ongoing (interventions implemented as appropriate)   05/10/19 0359   Coping/Psychosocial   Plan of Care Reviewed With patient   Plan of Care Review   Progress improving   OTHER   Outcome Summary patient denies chest pain - pt has not had any diarrhea through night shift - NS running at 150cc/hr - pt slept well through night - slight temperature at night with tylenol given: temp back to normal - pt tested positive for norovirus     Goal: Individualization and Mutuality  Outcome: Ongoing (interventions implemented as appropriate)      Problem: Cardiac: ACS (Acute Coronary Syndrome) (Adult)  Goal: Signs and Symptoms of Listed Potential Problems Will be Absent, Minimized or Managed (Cardiac: ACS)  Outcome: Ongoing (interventions implemented as appropriate)

## 2019-05-11 ENCOUNTER — READMISSION MANAGEMENT (OUTPATIENT)
Dept: CALL CENTER | Facility: HOSPITAL | Age: 64
End: 2019-05-11

## 2019-05-11 VITALS
OXYGEN SATURATION: 97 % | SYSTOLIC BLOOD PRESSURE: 139 MMHG | WEIGHT: 170 LBS | RESPIRATION RATE: 18 BRPM | HEART RATE: 80 BPM | BODY MASS INDEX: 29.02 KG/M2 | DIASTOLIC BLOOD PRESSURE: 69 MMHG | HEIGHT: 64 IN

## 2019-05-11 NOTE — DISCHARGE INSTRUCTIONS
Medications as directed.  Plenty of fluids.  Follow-up with urology as above.  Return to ED for uncontrolled pain, development of high fever, chills, other signs of systemic infection, medical emergencies.

## 2019-05-11 NOTE — OUTREACH NOTE
Prep Survey      Responses   Facility patient discharged from?  [facility]   Is LACE score < 7 ?  Yes   Is patient eligible?  Yes   Discharge diagnosis  Chest pain with mostly typical features   Does the patient have one of the following disease processes/diagnoses(primary or secondary)?  Other   Does the patient have Home health ordered?  No   Is there a DME ordered?  No   Prep survey completed?  Yes          Ivy Boo RN

## 2019-05-11 NOTE — ED PROVIDER NOTES
"Subjective   Ms. Christiane Smith is a 62 yo WF who presents stating \" I do not know if this is a kidney stone\".  Patient has left flank pain.  Sudden onset at 4 PM today.  Associated nausea and vomiting.  The pain radiates to the left lower quadrant.  Patient cannot find a comfortable position.  No fever or chills.  Patient took Phenergan at 5 PM.  This improved the nausea and vomiting.  However the pain was unaffected.  Patient presents for evaluation.    Patient was admitted on 5/8/2019 secondary to chest pain.  Her chest pain work-up was unremarkable.  However patient developed diarrhea while in the hospital.  She was kept for a total of 2 nights.  She was discharged earlier today.  Patient reports her diarrhea has now resolved.  However she does not know the urine color or frequency this evening as urination has occurred with diarrhea earlier in the day.        History provided by:  Patient  Flank Pain   Pain location:  L flank  Pain quality: sharp and stabbing    Pain radiates to:  LLQ  Pain severity:  Severe  Onset quality:  Sudden  Duration:  5 hours  Timing:  Constant  Progression:  Waxing and waning  Chronicity:  New  Context comment:  As described above.  Relieved by:  Nothing  Worsened by:  Nothing  Ineffective treatments: Phenergan.  Associated symptoms: diarrhea (Diarrhea earlier today.  However not associated with patient's current symptoms.), nausea and vomiting    Associated symptoms: no chest pain, no chills, no constipation, no dysuria, no fever, no hematuria and no shortness of breath        Review of Systems   Constitutional: Negative for chills and fever.   HENT: Negative for rhinorrhea.    Eyes: Negative for visual disturbance.   Respiratory: Negative for shortness of breath.    Cardiovascular: Negative for chest pain.   Gastrointestinal: Positive for diarrhea (Diarrhea earlier today.  However not associated with patient's current symptoms.), nausea and vomiting. Negative for constipation. "   Genitourinary: Positive for flank pain. Negative for dysuria, hematuria and urgency.   Musculoskeletal: Negative for myalgias.   Skin: Negative for color change.   Neurological: Negative for syncope.       Past Medical History:   Diagnosis Date   • Arthritis    • GERD (gastroesophageal reflux disease)    • Hyperlipidemia    • Hypertension    • Migraine    • Plantar fasciitis        No Known Allergies    Past Surgical History:   Procedure Laterality Date   • CHOLECYSTECTOMY         Family History   Problem Relation Age of Onset   • Breast cancer Maternal Grandmother        Social History     Socioeconomic History   • Marital status:      Spouse name: Not on file   • Number of children: Not on file   • Years of education: Not on file   • Highest education level: Not on file   Tobacco Use   • Smoking status: Never Smoker   • Smokeless tobacco: Never Used   Substance and Sexual Activity   • Alcohol use: No   • Drug use: No   • Sexual activity: Defer           Objective   Physical Exam   Constitutional: She is oriented to person, place, and time. She appears well-developed and well-nourished. No distress.   63-year-old white female laying in bed.  Patient has an constant motion secondary to pain.  She is holding posterior left flank with her left hand.  Patient is in obvious pain.  Vital signs notable for BP of 166/110.  Otherwise unremarkable.  She appears in good overall health.   is at bedside.   HENT:   Head: Normocephalic and atraumatic.   Right Ear: External ear normal.   Left Ear: External ear normal.   Nose: Nose normal.   Mouth/Throat: Oropharynx is clear and moist.   Eyes: Conjunctivae and EOM are normal. Pupils are equal, round, and reactive to light.   Neck: Normal range of motion. Neck supple.   Cardiovascular: Normal rate, regular rhythm, normal heart sounds and intact distal pulses. Exam reveals no gallop and no friction rub.   No murmur heard.  Pulmonary/Chest: Effort normal and breath  sounds normal.   Abdominal: Soft. Bowel sounds are normal. She exhibits no distension. There is no tenderness.   Musculoskeletal: Normal range of motion.   Neurological: She is alert and oriented to person, place, and time. She has normal reflexes.   Skin: Skin is warm and dry. She is not diaphoretic.   Psychiatric: She has a normal mood and affect. Her behavior is normal.   Nursing note and vitals reviewed.      Procedures           ED Course  ED Course as of May 10 2348   Fri May 10, 2019   2130 Patient experiencing significant pain.  I suspect kidney stone.  Giving Dilaudid, Toradol and Zofran.  [SS]   2203 Patient now resting in bed.  Reports pain significantly improved.  No nausea.  CBC is unremarkable.  CMP only notable for glucose of 125 and CO2 of 17.9.  Patient mildly dehydrated from recent diarrhea.  We will give IV fluids.  [SS]   2206 Patient reports pain is increasing.  Giving additional aliquots of Dilaudid  [SS]   2252 Patient continues to have pain.  She is somewhat restless.  Giving fentanyl.  CT shows a 3 mm stone left UVJ.  Associated moderate severe hydro-.  This is obviously the source of patient's pain.  I discussed results thus far with patient.  She has been unable to provide a urine sample thus far.  Anticipate discharging home once we have obtained control of patient's pain  [SS]   2337 Patient is feeling much better.  Responded much better to fentanyl.  UA shows large blood but is otherwise unremarkable.  Antibiotics not indicated.  Will discharge patient home with prescriptions for Percocet and Zofran.  [facility] with her prior kidney stone.  Underwent lithotripsy and stent placement.  Recommend she follow-up with [facility] this coming week.  Discussed at length with patient and spouse all results, diagnoses treatment and follow-up.  [SS]      ED Course User Index  [SS] Messi Yoder MD      Labs Reviewed   COMPREHENSIVE METABOLIC PANEL - Abnormal; Notable for the  following components:       Result Value    Glucose 125 (*)     Potassium 3.4 (*)     CO2 17.9 (*)     All other components within normal limits    Narrative:     GFR Normal >60  Chronic Kidney Disease <60  Kidney Failure <15   URINALYSIS W/ MICROSCOPIC IF INDICATED (NO CULTURE) - Abnormal; Notable for the following components:    Appearance, UA Cloudy (*)     Ketones, UA 40 mg/dL (2+) (*)     Blood, UA Large (3+) (*)     All other components within normal limits   CBC WITH AUTO DIFFERENTIAL - Abnormal; Notable for the following components:    Neutrophil % 77.1 (*)     Lymphocyte % 14.4 (*)     Eosinophil % 0.2 (*)     Immature Grans % 0.6 (*)     All other components within normal limits   URINALYSIS, MICROSCOPIC ONLY - Abnormal; Notable for the following components:    RBC, UA 13-20 (*)     WBC, UA 0-2 (*)     All other components within normal limits   CBC AND DIFFERENTIAL    Narrative:     The following orders were created for panel order CBC & Differential.  Procedure                               Abnormality         Status                     ---------                               -----------         ------                     CBC Auto Differential[[id number]]        Abnormal            Final result                 Please view results for these tests on the individual orders.     Ct Abdomen Pelvis Without Contrast    Result Date: 5/10/2019  Narrative: CT Abdomen Pelvis WO INDICATION: Left-sided lower abdominal pain and nausea beginning earlier in the day TECHNIQUE: CT of the abdomen and pelvis without contrast. Coronal and sagittal reconstructions were obtained.  Radiation dose reduction techniques included automated exposure control or exposure modulation based on body size. Count of known CT and cardiac nuc med studies performed in previous 12 months: 0. COMPARISON: None available. FINDINGS: Abdomen: There is mild atelectasis or scarring at both lung bases posteriorly. The liver, spleen and pancreas are  normal in size. The right kidney contains a lower pole nonobstructing stone measuring 4 mm in diameter. The left kidney demonstrates moderately severe hydronephrosis. The ureter is dilated down to the ureterovesical junction where a 3 mm obstructing stone is noted. There is no evidence of retroperitoneal adenopathy. There are no distended bowel loops. Pelvis: Normal appendix. No evidence of adenopathy, mass or fluid collection. The uterus is retroverted and the fundus of the uterus contains a large densely calcified fibroid.     Impression: 3 mm obstructing stone at the left UVJ with moderately severe hydronephrosis. Nonobstructing 4 mm kidney stone on the right. Signer Name: Jason Vaca MD  Signed: 5/10/2019 10:37 PM  Workstation Name: [email or web address]     Xr Chest 2 View    Result Date: 5/8/2019  Narrative: CHEST 2 VIEWS 05/08/2019  HISTORY: Chest pain and nausea and shortness of breath beginning at 6:00 AM this morning.  FINDINGS: The heart is normal in size. There is poor inspiratory result with bibasilar discoid atelectasis. The lungs are otherwise clear. There are no pleural effusions.      Impression: No active pulmonary disease.  This report was finalized on 5/8/2019 9:33 AM by Dr. Sergei Floyd MD.      My differential diagnosis for abdominal pain includes but is not limited to:  Gastritis, gastroenteritis, peptic ulcer disease, GERD, esophageal perforation, acute appendicitis, mesenteric adenitis, Meckel’s diverticulum, epiploic appendagitis, diverticulitis, colon cancer, ulcerative colitis, Crohn’s disease, intussusception, small bowel obstruction, adhesions, ischemic bowel, perforated viscus, ileus, obstipation, biliary colic, cholecystitis, cholelithiasis, Segundo-Rad Mc, hepatitis, pancreatitis, common bile duct obstruction, cholangitis, bile leak, splenic trauma, splenic rupture, splenic infarction, splenic abscess, abdominal abscess, ascites, spontaneous bacterial peritonitis, hernia, UTI, cystitis,  prostatitis, ureterolithiasis, urinary obstruction, ovarian cyst, torsion, pregnancy, ectopic pregnancy, PID, pelvic abscess, mittelschmerz, endometriosis, AAA, myocardial infarction, pneumonia, cancer, porphyria, DKA, medications, sickle cell, viral syndrome, zoster              MDM  Number of Diagnoses or Management Options  Kidney stone on left side: new and requires workup     Amount and/or Complexity of Data Reviewed  Clinical lab tests: reviewed and ordered  Tests in the radiology section of CPT®: reviewed and ordered    Risk of Complications, Morbidity, and/or Mortality  Presenting problems: moderate  Diagnostic procedures: high  Management options: moderate    Patient Progress  Patient progress: improved        Final diagnoses:   Kidney stone on left side            Messi Yoder MD  05/10/19 4228

## 2019-05-13 ENCOUNTER — READMISSION MANAGEMENT (OUTPATIENT)
Dept: CALL CENTER | Facility: HOSPITAL | Age: 64
End: 2019-05-13

## 2019-05-13 NOTE — DISCHARGE SUMMARY
"Christiane Smith  1955  [id number]        Hospitalists Discharge Summary    Date of Admission: 5/8/2019  Date of Discharge:  5/10/2019    Primary Discharge Diagnoses: Chest Pain w/ typical and atypical features w/ ruled out for ischemic etiology POA    Secondary Discharge Diagnoses:   Acute Noroviral Gastroenteritis POA  HTN POA  Chronic Migraines POA    PCP  Patient Care Team:  Shan Collier MD as PCP - General (Family Medicine)    Consults:   Consults     Date and Time Order Name Status Description    5/8/2019 1012 Inpatient Cardiology Consult Completed           CC:  Chest pressure and nausea    History of Present Illness:  As per H&P: \"62 yo WF w/ PMH of HTN, chronic migraines, and GERD.  Who personally states she was in her usual good health prior to this morning.  When she woke up from sleep about 4 AM with a pressure-like chest pain that was felt over most of her anterior chest, without radiation that was associated with nausea and diaphoresis.  She was brought at her 's insistence, and developed vomiting with nitro administration.  Pain has gotten better with some nitro.  Chest pain is currently minimal.  Patient's biggest complaint currently is that of a headache like her chronic migraines.  She denies radiation of the pain.     Patient has been continued to be n.p.o. throughout the day, and patient's attributes her headache and feeling of overall tiredness to be secondary to her getting exercise stress test without eating.     When asked what her usual activity level is patient states that she can usually climb 3 or 4 sets of stairs without becoming winded.  States that she is never had this chest pressure before.  And states that she has never felt indigestion \"in her life\".  (But patient has a past medical history of GERD and is on a PPI)     Daughter suggests that patient may be minimizing her symptoms, and that she has noticed progressive exercise intolerance over the last month " "or 2.  And states that her mother has been climbing less and less stairs, and complaining of dyspnea as to the reason she has been limiting her exercise.     Patient states the day prior to admission, she was out planting flowers.  And this activity was only limited due to her chronic pain in her hip.     Patient denies ever drinking or smoking.  Drinks about 2 glasses of sweet tea a day.  And a small cup of coffee.  Patient is on both estradiol and Provera hormone supplementation.  She also has chronic NSAID use with Daypro.\"    Hospital Course  Pt was given a stress test that was negative for ischemic disease. Cardiology didn't feel she needed any further testing.    However, prior to being discharged, she developed copious waterry diarrhea, with stools every 5-10 minutes. No further vomiting since the ER. Her  at home had also developed the same symptoms about 6 hours prior to her at the hospital. Pt had been on maintance fluids for being NPO for testing. These were increased and pt given a clear liquid diet overnight. In the AM, stool frequency slowed to the point where the pt could be discharged, and she tolerated a normal diet. No sign of marci on AM labs, encouraged pt to increase fluid and gatoraid intake over the next day or two.     Possible the Chest pressure and nausea was early prodrome of viral infection, or Gastritis.     Physical Exam at Discharge  Vital Signs      Vitals:    05/09/19 1949 05/09/19 2359 05/10/19 0356 05/10/19 0652   BP: 129/56 110/58  105/59   BP Location: Left arm Left arm  Left arm   Patient Position: Lying Lying  Lying   Pulse: 84 83  68   Resp: 18 16  18   Temp: 99.2 °F (37.3 °C) 100.1 °F (37.8 °C) 98 °F (36.7 °C) 97.1 °F (36.2 °C)   TempSrc: Oral Oral Oral Oral   SpO2: 95% 98%  96%   Weight:       Height:           Physical Exam:  Physical Exam   Constitutional: She is oriented to person, place, and time. She appears well-developed and well-nourished. No distress.   Eyes: " Pupils are equal, round, and reactive to light.   Cardiovascular: Normal rate, regular rhythm and normal heart sounds. Exam reveals no friction rub.   No murmur heard.  Pulmonary/Chest: Effort normal and breath sounds normal. No stridor. No respiratory distress. She has no wheezes. She has no rales.   Abdominal: Soft. Bowel sounds are normal. She exhibits no distension. There is no tenderness. There is no guarding.   Musculoskeletal: She exhibits no edema.   Neurological: She is alert and oriented to person, place, and time. No cranial nerve deficit.   Skin: Skin is warm and dry. She is not diaphoretic. No erythema.   Psychiatric: She has a normal mood and affect. Her behavior is normal.   Nursing note and vitals reviewed.      Operations and Procedures Performed:        Allergies:  has No Known Allergies.    Stefano  reviewed    Discharge Medications:     Discharge Medications      New Medications      Instructions Start Date   aspirin 81 MG EC tablet   81 mg, Oral, Daily         Continue These Medications      Instructions Start Date   atorvastatin 10 MG tablet  Commonly known as:  LIPITOR   10 mg, Daily      CALCIUM 1200+D3 600- MG-MG-UNIT tablet sustained-release 24 hour  Generic drug:  Calcium-Magnesium-Vitamin D ER   1 tablet, Oral, Daily      estradiol 0.5 MG tablet  Commonly known as:  ESTRACE   TAKE ONE TABLET BY MOUTH DAILY      lisinopril 10 MG tablet  Commonly known as:  PRINIVIL,ZESTRIL   10 mg, Daily      medroxyPROGESTERone 2.5 MG tablet  Commonly known as:  PROVERA   TAKE 1 TABLET BY MOUTH ONCE A DAY      montelukast 10 MG tablet  Commonly known as:  SINGULAIR   10 mg, Oral, Daily      multivitamin with minerals tablet tablet   1 tablet, Oral, Daily      omeprazole 40 MG capsule  Commonly known as:  priLOSEC   40 mg, Daily      SUMAtriptan 50 MG tablet  Commonly known as:  IMITREX   50 mg, Oral, Every 2 Hours PRN, Take one tablet at onset of headache. May repeat dose one time in 2 hours if  headache not relieved.          Stop These Medications    oxaprozin 600 MG tablet  Commonly known as:  DAYPRO            Last Lab Results:   Lab Results (last 72 hours)     Procedure Component Value Units Date/Time    Comprehensive Metabolic Panel [[id number]]  (Abnormal) Collected:  05/10/19 0414    Specimen:  Blood Updated:  05/10/19 0444     Glucose 92 mg/dL      BUN 8 mg/dL      Creatinine 0.56 mg/dL      Sodium 141 mmol/L      Potassium 3.0 mmol/L      Chloride 109 mmol/L      CO2 20.4 mmol/L      Calcium 8.0 mg/dL      Total Protein 5.5 g/dL      Albumin 3.20 g/dL      ALT (SGPT) 17 U/L      AST (SGOT) 19 U/L      Alkaline Phosphatase 37 U/L      Total Bilirubin 0.3 mg/dL      eGFR Non African Amer 109 mL/min/1.73      Globulin 2.3 gm/dL      A/G Ratio 1.4 g/dL      BUN/Creatinine Ratio 14.3     Anion Gap 11.6 mmol/L     Narrative:       GFR Normal >60  Chronic Kidney Disease <60  Kidney Failure <15    Magnesium [[id number]]  (Normal) Collected:  05/10/19 0414    Specimen:  Blood Updated:  05/10/19 0436     Magnesium 1.6 mg/dL     CBC & Differential [[id number]] Collected:  05/10/19 0414    Specimen:  Blood Updated:  05/10/19 0423    Narrative:       The following orders were created for panel order CBC & Differential.  Procedure                               Abnormality         Status                     ---------                               -----------         ------                     CBC Auto Differential[[id number]]        Abnormal            Final result                 Please view results for these tests on the individual orders.    CBC Auto Differential [[id number]]  (Abnormal) Collected:  05/10/19 0414    Specimen:  Blood Updated:  05/10/19 0423     WBC 4.66 10*3/mm3      RBC 3.74 10*6/mm3      Hemoglobin 11.6 g/dL      Hematocrit 34.4 %      MCV 92.0 fL      MCH 31.0 pg      MCHC 33.7 g/dL      RDW 13.1 %      RDW-SD 43.7 fl      MPV 10.1 fL      Platelets 152 10*3/mm3      Neutrophil % 47.7 %       Lymphocyte % 36.9 %      Monocyte % 12.0 %      Eosinophil % 2.6 %      Basophil % 0.6 %      Immature Grans % 0.2 %      Neutrophils, Absolute 2.22 10*3/mm3      Lymphocytes, Absolute 1.72 10*3/mm3      Monocytes, Absolute 0.56 10*3/mm3      Eosinophils, Absolute 0.12 10*3/mm3      Basophils, Absolute 0.03 10*3/mm3      Immature Grans, Absolute 0.01 10*3/mm3      nRBC 0.0 /100 WBC     Gastrointestinal Panel, PCR - Stool, Per Rectum [[id number]]  (Abnormal) Collected:  05/09/19 1154    Specimen:  Stool from Per Rectum Updated:  05/09/19 1748     Campylobacter Not Detected     Plesiomonas shigelloides Not Detected     Salmonella Not Detected     Vibrio Not Detected     Vibrio cholerae Not Detected     Yersinia enterocolitica Not Detected     Enteroaggregative E. coli (EAEC) Not Detected     Enteropathogenic E. coli (EPEC) Not Detected     Enterotoxigenic E. coli (ETEC) lt/st Not Detected     Shiga-like toxin-producing E. coli (STEC) stx1/stx2 Not Detected     E. coli O157 Not Detected     Shigella/Enteroinvasive E. coli (EIEC) Not Detected     Cryptosporidium Not Detected     Cyclospora cayetanensis Not Detected     Entamoeba histolytica Not Detected     Giardia lamblia Not Detected     Adenovirus F40/41 Not Detected     Astrovirus Not Detected     Norovirus GI/GII Detected     Rotavirus A Not Detected     Sapovirus (I, II, IV or V) Not Detected    Troponin [[id number]]  (Normal) Collected:  05/09/19 0559    Specimen:  Blood Updated:  05/09/19 0646     Troponin T <0.010 ng/mL     Narrative:       Troponin T Reference Range:  <= 0.03 ng/mL-   Negative for AMI  >0.03 ng/mL-     Abnormal for myocardial necrosis.  Clinicians would have to utilize clinical acumen, EKG, Troponin and serial changes to determine if it is an Acute Myocardial Infarction or myocardial injury due to an underlying chronic condition.     Basic Metabolic Panel [[id number]]  (Abnormal) Collected:  05/09/19 0559    Specimen:  Blood Updated:  05/09/19  0644     Glucose 100 mg/dL      BUN 20 mg/dL      Creatinine 0.71 mg/dL      Sodium 141 mmol/L      Potassium 3.4 mmol/L      Chloride 105 mmol/L      CO2 21.6 mmol/L      Calcium 8.4 mg/dL      eGFR Non African Amer 83 mL/min/1.73      BUN/Creatinine Ratio 28.2     Anion Gap 14.4 mmol/L     Narrative:       GFR Normal >60  Chronic Kidney Disease <60  Kidney Failure <15    Lipid Panel [[id number]]  (Abnormal) Collected:  05/09/19 0559    Specimen:  Blood Updated:  05/09/19 0644     Total Cholesterol 129 mg/dL      Triglycerides 57 mg/dL      HDL Cholesterol 61 mg/dL      LDL Cholesterol  57 mg/dL      VLDL Cholesterol 11.4 mg/dL      LDL/HDL Ratio 0.93    Narrative:       Cholesterol Reference Ranges  (U.S. Department of Health and Human Services ATP III Classifications)    Desirable          <200 mg/dL  Borderline High    200-239 mg/dL  High Risk          >240 mg/dL      Triglyceride Reference Ranges  (U.S. Department of Health and Human Services ATP III Classifications)    Normal           <150 mg/dL  Borderline High  150-199 mg/dL  High             200-499 mg/dL  Very High        >500 mg/dL    HDL Reference Ranges  (U.S. Department of Health and Human Services ATP III Classifcations)    Low     <40 mg/dl (major risk factor for CHD)  High    >60 mg/dl ('negative' risk factor for CHD)        LDL Reference Ranges  (U.S. Department of Health and Human Services ATP III Classifcations)    Optimal          <100 mg/dL  Near Optimal     100-129 mg/dL  Borderline High  130-159 mg/dL  High             160-189 mg/dL  Very High        >189 mg/dL    Protime-INR [[id number]]  (Abnormal) Collected:  05/09/19 0559    Specimen:  Blood Updated:  05/09/19 0632     Protime 14.6 Seconds      INR 1.17    Narrative:       Therapeutic Ranges for INR: 2.0-3.0 (PT 20-30)                              2.5-3.5 (PT 25-34)    CBC & Differential [[id number]] Collected:  05/09/19 0559    Specimen:  Blood Updated:  05/09/19 0622    Narrative:        The following orders were created for panel order CBC & Differential.  Procedure                               Abnormality         Status                     ---------                               -----------         ------                     CBC Auto Differential[[id number]]        Abnormal            Final result                 Please view results for these tests on the individual orders.    CBC Auto Differential [[id number]]  (Abnormal) Collected:  05/09/19 0559    Specimen:  Blood Updated:  05/09/19 0622     WBC 7.00 10*3/mm3      RBC 4.35 10*6/mm3      Hemoglobin 13.3 g/dL      Hematocrit 39.7 %      MCV 91.3 fL      MCH 30.6 pg      MCHC 33.5 g/dL      RDW 12.9 %      RDW-SD 43.1 fl      MPV 10.6 fL      Platelets 169 10*3/mm3      Neutrophil % 80.2 %      Lymphocyte % 10.9 %      Monocyte % 7.3 %      Eosinophil % 0.9 %      Basophil % 0.3 %      Immature Grans % 0.4 %      Neutrophils, Absolute 5.62 10*3/mm3      Lymphocytes, Absolute 0.76 10*3/mm3      Monocytes, Absolute 0.51 10*3/mm3      Eosinophils, Absolute 0.06 10*3/mm3      Basophils, Absolute 0.02 10*3/mm3      Immature Grans, Absolute 0.03 10*3/mm3      nRBC 0.0 /100 WBC     Magnesium [[id number]]  (Normal) Collected:  05/08/19 1717    Specimen:  Blood Updated:  05/09/19 0024     Magnesium 1.7 mg/dL     TSH [[id number]]  (Normal) Collected:  05/08/19 1717    Specimen:  Blood Updated:  05/08/19 1903     TSH 1.490 mIU/mL     Hemoglobin A1c [[id number]]  (Normal) Collected:  05/08/19 0830    Specimen:  Blood Updated:  05/08/19 1901     Hemoglobin A1C 5.00 %     Narrative:       Hemoglobin A1C Ranges:    Increased Risk for Diabetes  5.7% to 6.4%  Diabetes                     >= 6.5%  Diabetic Goal                < 7.0%    Troponin [[id number]]  (Normal) Collected:  05/08/19 1717    Specimen:  Blood Updated:  05/08/19 1743     Troponin T <0.010 ng/mL     Narrative:       Troponin T Reference Range:  <= 0.03 ng/mL-   Negative for AMI  >0.03 ng/mL-      Abnormal for myocardial necrosis.  Clinicians would have to utilize clinical acumen, EKG, Troponin and serial changes to determine if it is an Acute Myocardial Infarction or myocardial injury due to an underlying chronic condition.     Troponin [[id number]]  (Normal) Collected:  05/08/19 1131    Specimen:  Blood Updated:  05/08/19 1217     Troponin T <0.010 ng/mL     Narrative:       Troponin T Reference Range:  <= 0.03 ng/mL-   Negative for AMI  >0.03 ng/mL-     Abnormal for myocardial necrosis.  Clinicians would have to utilize clinical acumen, EKG, Troponin and serial changes to determine if it is an Acute Myocardial Infarction or myocardial injury due to an underlying chronic condition.     D-dimer, Quantitative [[id number]]  (Normal) Collected:  05/08/19 0830    Specimen:  Blood Updated:  05/08/19 1006     D-Dimer, Quantitative 0.28 MCGFEU/mL     Narrative:       Can be elevated in, but is not diagnostic for deep vein thrombosis (DVT) or pulmonary embolis (PE).  It is also elevated in other medical conditions.  Clinical correlation is required.  The negative cut-off value for the D-Dimer is 0.50 mcg FEU/mL for DVT and PE.    [place] Draw [[id number]] Collected:  05/08/19 0830    Specimen:  Blood Updated:  05/08/19 0930    Narrative:       The following orders were created for panel order [place] Draw.  Procedure                               Abnormality         Status                     ---------                               -----------         ------                     Light Blue Top[[id number]]                                   Final result               Green Top (Gel)[[id number]]                                  Final result               Lavender Top[[id number]]                                     Final result               Gold Top - SST[[id number]]                                   Final result                 Please view results for these tests on the individual orders.    Light Blue Top [[id number]]  Collected:  05/08/19 0830    Specimen:  Blood Updated:  05/08/19 0930     Extra Tube hold for add-on     Comment: Auto resulted       Green Top (Gel) [[id number]] Collected:  05/08/19 0830    Specimen:  Blood Updated:  05/08/19 0930     Extra Tube Hold for add-ons.     Comment: Auto resulted.       Lavender Top [[id number]] Collected:  05/08/19 0830    Specimen:  Blood Updated:  05/08/19 0930     Extra Tube hold for add-on     Comment: Auto resulted       Gold Top - SST [[id number]] Collected:  05/08/19 0830    Specimen:  Blood Updated:  05/08/19 0930     Extra Tube Hold for add-ons.     Comment: Auto resulted.       Comprehensive Metabolic Panel [[id number]]  (Abnormal) Collected:  05/08/19 0830    Specimen:  Blood Updated:  05/08/19 0853     Glucose 96 mg/dL      BUN 31 mg/dL      Creatinine 0.75 mg/dL      Sodium 142 mmol/L      Potassium 3.7 mmol/L      Chloride 102 mmol/L      CO2 23.1 mmol/L      Calcium 9.5 mg/dL      Total Protein 7.4 g/dL      Albumin 4.50 g/dL      ALT (SGPT) 18 U/L      AST (SGOT) 19 U/L      Alkaline Phosphatase 61 U/L      Total Bilirubin 0.6 mg/dL      eGFR Non African Amer 78 mL/min/1.73      Globulin 2.9 gm/dL      A/G Ratio 1.6 g/dL      BUN/Creatinine Ratio 41.3     Anion Gap 16.9 mmol/L     Narrative:       GFR Normal >60  Chronic Kidney Disease <60  Kidney Failure <15    Troponin [[id number]]  (Normal) Collected:  05/08/19 0830    Specimen:  Blood Updated:  05/08/19 0853     Troponin T <0.010 ng/mL     Narrative:       Troponin T Reference Range:  <= 0.03 ng/mL-   Negative for AMI  >0.03 ng/mL-     Abnormal for myocardial necrosis.  Clinicians would have to utilize clinical acumen, EKG, Troponin and serial changes to determine if it is an Acute Myocardial Infarction or myocardial injury due to an underlying chronic condition.     CBC & Differential [[id number]] Collected:  05/08/19 0830    Specimen:  Blood Updated:  05/08/19 0835    Narrative:       The following orders were created  for panel order CBC & Differential.  Procedure                               Abnormality         Status                     ---------                               -----------         ------                     CBC Auto Differential[[id number]]        Abnormal            Final result                 Please view results for these tests on the individual orders.    CBC Auto Differential [[id number]]  (Abnormal) Collected:  05/08/19 0830    Specimen:  Blood Updated:  05/08/19 0835     WBC 10.58 10*3/mm3      RBC 5.06 10*6/mm3      Hemoglobin 15.4 g/dL      Hematocrit 46.3 %      MCV 91.5 fL      MCH 30.4 pg      MCHC 33.3 g/dL      RDW 13.1 %      RDW-SD 44.2 fl      MPV 10.1 fL      Platelets 213 10*3/mm3      Neutrophil % 86.2 %      Lymphocyte % 6.0 %      Monocyte % 5.4 %      Eosinophil % 1.6 %      Basophil % 0.5 %      Immature Grans % 0.3 %      Neutrophils, Absolute 9.13 10*3/mm3      Lymphocytes, Absolute 0.63 10*3/mm3      Monocytes, Absolute 0.57 10*3/mm3      Eosinophils, Absolute 0.17 10*3/mm3      Basophils, Absolute 0.05 10*3/mm3      Immature Grans, Absolute 0.03 10*3/mm3      nRBC 0.0 /100 WBC         Ct Abdomen Pelvis Without Contrast    Result Date: 5/10/2019  Narrative: CT Abdomen Pelvis WO INDICATION: Left-sided lower abdominal pain and nausea beginning earlier in the day TECHNIQUE: CT of the abdomen and pelvis without contrast. Coronal and sagittal reconstructions were obtained.  Radiation dose reduction techniques included automated exposure control or exposure modulation based on body size. Count of known CT and cardiac nuc med studies performed in previous 12 months: 0. COMPARISON: None available. FINDINGS: Abdomen: There is mild atelectasis or scarring at both lung bases posteriorly. The liver, spleen and pancreas are normal in size. The right kidney contains a lower pole nonobstructing stone measuring 4 mm in diameter. The left kidney demonstrates moderately severe hydronephrosis. The ureter  is dilated down to the ureterovesical junction where a 3 mm obstructing stone is noted. There is no evidence of retroperitoneal adenopathy. There are no distended bowel loops. Pelvis: Normal appendix. No evidence of adenopathy, mass or fluid collection. The uterus is retroverted and the fundus of the uterus contains a large densely calcified fibroid.     Impression: 3 mm obstructing stone at the left UVJ with moderately severe hydronephrosis. Nonobstructing 4 mm kidney stone on the right. Signer Name: Jason Vaca MD  Signed: 5/10/2019 10:37 PM  Workstation Name: [id number]     Xr Chest 2 View    Result Date: 5/8/2019  Narrative: CHEST 2 VIEWS 05/08/2019  HISTORY: Chest pain and nausea and shortness of breath beginning at 6:00 AM this morning.  FINDINGS: The heart is normal in size. There is poor inspiratory result with bibasilar discoid atelectasis. The lungs are otherwise clear. There are no pleural effusions.      Impression: No active pulmonary disease.  This report was finalized on 5/8/2019 9:33 AM by Dr. Sergei Floyd MD.        Condition on Discharge:  Good    Discharge Disposition  To home    Visiting Nurse:    No     Home PT/OT:  No     Home Safety Evaluation:  No     DME  None    Discharge Diet:        Activity at Discharge:  activity as tolerated      Pre-discharge education      Follow-up Appointments  No future appointments.  Additional Instructions for the Follow-ups that You Need to Schedule     Discharge Follow-up with PCP   As directed       Currently Documented PCP:    Shan Collier MD    PCP Phone Number:    [phone]     Follow Up Details:  2 weeks post hospital follow up for Chest Pain rule out probably due to Noroviral gastroenteritis prodrome               Test Results Pending at Discharge       Daniela Roman MD  05/13/19  2:58 PM    Time: Discharge < 30 (if over 30 minutes give explanation as to why it took greater than 30 minutes)

## 2019-05-13 NOTE — OUTREACH NOTE
LAG < 7 Survey      Responses   Facility patient discharged from?  [facility]   Does the patient have one of the following disease processes/diagnoses(primary or secondary)?  Other   Is there a successful TCM telephone encounter documented?  No   [facility] <7 Attempt successful?  Yes   Call start time  1149   Call end time  1152   Discharge diagnosis  Chest pain with mostly typical features   Meds reviewed with patient/caregiver?  Yes   Is the patient having any side effects they believe may be caused by any medication additions or changes?  No   Does the patient have all medications ordered at discharge?  Yes   Is the patient taking all medications as directed (includes completed medication regime)?  No   What is preventing the patient from taking all medications as directed?  Desires to consult PCP first   Nursing Interventions  Nurse provided patient education   Comments regarding appointments  sees PCP today   Does the patient have a primary care provider?   Yes   Does the patient have an appointment with their PCP within 7 days of discharge?  Yes   Has the patient kept scheduled appointments due by today?  Yes   Psychosocial issues?  No   Did the patient receive a copy of their discharge instructions?  Yes   Nursing interventions  Reviewed instructions with patient   What is the patient's perception of their health status since discharge?  Returned to baseline/stable   Graduated  Yes          Beth Palacio RN

## 2019-10-24 ENCOUNTER — PROCEDURE VISIT (OUTPATIENT)
Dept: OBSTETRICS AND GYNECOLOGY | Facility: CLINIC | Age: 64
End: 2019-10-24

## 2019-10-24 ENCOUNTER — APPOINTMENT (OUTPATIENT)
Dept: WOMENS IMAGING | Facility: HOSPITAL | Age: 64
End: 2019-10-24

## 2019-10-24 DIAGNOSIS — Z12.31 VISIT FOR SCREENING MAMMOGRAM: Primary | ICD-10-CM

## 2019-10-24 PROCEDURE — 77063 BREAST TOMOSYNTHESIS BI: CPT | Performed by: OBSTETRICS & GYNECOLOGY

## 2019-10-24 PROCEDURE — 77063 BREAST TOMOSYNTHESIS BI: CPT | Performed by: RADIOLOGY

## 2019-10-24 PROCEDURE — 77067 SCR MAMMO BI INCL CAD: CPT | Performed by: OBSTETRICS & GYNECOLOGY

## 2019-10-24 PROCEDURE — 77067 SCR MAMMO BI INCL CAD: CPT | Performed by: RADIOLOGY

## 2020-03-04 RX ORDER — MEDROXYPROGESTERONE ACETATE 2.5 MG/1
TABLET ORAL
Qty: 90 TABLET | Refills: 1 | Status: SHIPPED | OUTPATIENT
Start: 2020-03-04 | End: 2021-06-09 | Stop reason: SDUPTHER

## 2020-10-22 ENCOUNTER — OFFICE VISIT (OUTPATIENT)
Dept: OBSTETRICS AND GYNECOLOGY | Facility: CLINIC | Age: 65
End: 2020-10-22

## 2020-10-22 VITALS
DIASTOLIC BLOOD PRESSURE: 80 MMHG | WEIGHT: 166 LBS | BODY MASS INDEX: 28.34 KG/M2 | HEIGHT: 64 IN | SYSTOLIC BLOOD PRESSURE: 128 MMHG

## 2020-10-22 DIAGNOSIS — Z01.419 ENCOUNTER FOR GYNECOLOGICAL EXAMINATION WITHOUT ABNORMAL FINDING: Primary | ICD-10-CM

## 2020-10-22 DIAGNOSIS — M85.80 OSTEOPENIA, UNSPECIFIED LOCATION: ICD-10-CM

## 2020-10-22 DIAGNOSIS — Z12.39 ENCOUNTER FOR BREAST CANCER SCREENING USING NON-MAMMOGRAM MODALITY: ICD-10-CM

## 2020-10-22 PROCEDURE — 99397 PER PM REEVAL EST PAT 65+ YR: CPT | Performed by: OBSTETRICS & GYNECOLOGY

## 2020-10-22 PROCEDURE — 82274 ASSAY TEST FOR BLOOD FECAL: CPT | Performed by: OBSTETRICS & GYNECOLOGY

## 2020-10-22 RX ORDER — FLUOROURACIL 50 MG/G
CREAM TOPICAL
Status: ON HOLD | COMMUNITY
Start: 2020-10-12 | End: 2021-01-07

## 2020-10-22 RX ORDER — CELECOXIB 200 MG/1
200 CAPSULE ORAL 2 TIMES DAILY
COMMUNITY
Start: 2020-08-12

## 2020-10-22 NOTE — PROGRESS NOTES
Subjective    Chief Complaint   Patient presents with   • Gynecologic Exam     AE      History of Present Illness    Christiane Smith is a 65 y.o. female who presents for annual exam.  Non-smoker.  Mammogram due after  and scheduled for next week..  DEXA scan 2017 showed osteopenia.  Colonoscopy approximately 9 years ago and due every 10 years.  No bleeding and no problems.  Obstetric History:  OB History        1    Para   1    Term   1            AB        Living           SAB        TAB        Ectopic        Molar        Multiple        Live Births                   Menstrual History:     No LMP recorded. Patient is postmenopausal.       Past Medical History:   Diagnosis Date   • Arthritis    • GERD (gastroesophageal reflux disease)    • Hyperlipidemia    • Hypertension    • Migraine    • Plantar fasciitis      Family History   Problem Relation Age of Onset   • Breast cancer Maternal Grandmother      Social History     Tobacco Use   Smoking Status Never Smoker   Smokeless Tobacco Never Used         The following portions of the patient's history were reviewed and updated as appropriate: allergies, current medications, past family history, past medical history, past social history, past surgical history and problem list.    Review of Systems   Constitutional: Negative.  Negative for fever and unexpected weight change.   HENT: Negative.    Respiratory: Negative for shortness of breath and wheezing.    Cardiovascular: Negative for chest pain, palpitations and leg swelling.   Gastrointestinal: Negative for abdominal pain, anal bleeding and blood in stool.   Genitourinary: Negative for dysuria, pelvic pain, urgency, vaginal bleeding, vaginal discharge and vaginal pain.   Skin: Negative.    Neurological: Negative.    Hematological: Negative.  Negative for adenopathy.   Psychiatric/Behavioral: Negative.  Negative for dysphoric mood. The patient is not nervous/anxious.          "    Objective   Physical Exam  Exam conducted with a chaperone present.   Constitutional:       Appearance: Normal appearance. She is well-developed.   HENT:      Head: Normocephalic.   Neck:      Thyroid: No thyromegaly.      Trachea: Trachea normal. No tracheal deviation.   Cardiovascular:      Rate and Rhythm: Normal rate and regular rhythm.      Heart sounds: Normal heart sounds. No murmur.   Pulmonary:      Effort: Pulmonary effort is normal.      Breath sounds: Normal breath sounds.   Chest:      Breasts:         Right: Normal. No mass or tenderness.         Left: Normal. No mass or tenderness.   Abdominal:      Palpations: Abdomen is soft. There is no mass.      Tenderness: There is no abdominal tenderness.      Hernia: No hernia is present.   Genitourinary:     General: Normal vulva.      Labia:         Right: No lesion.         Left: No lesion.       Urethra: No prolapse or urethral lesion.      Vagina: Normal. No vaginal discharge.      Cervix: No cervical motion tenderness.      Uterus: Not enlarged and not tender.       Adnexa:         Right: No mass or tenderness.          Left: No mass or tenderness.        Rectum: Normal. Guaiac result negative. No external hemorrhoid or internal hemorrhoid. Normal anal tone.      Comments: External genitalia normal   Lymphadenopathy:      Cervical: No cervical adenopathy.      Upper Body:      Right upper body: No axillary adenopathy.      Left upper body: No axillary adenopathy.   Skin:     General: Skin is warm and dry.      Findings: No rash.   Neurological:      Mental Status: She is alert and oriented to person, place, and time.   Psychiatric:         Behavior: Behavior normal.         /80   Ht 162.6 cm (64\")   Wt 75.3 kg (166 lb)   BMI 28.49 kg/m²     Assessment/Plan   Diagnoses and all orders for this visit:    1. Encounter for gynecological examination without abnormal finding (Primary)  -     IGP,rfx Aptima HPV All Pth  -     POC Occult Blood " Stool    2. Osteopenia, unspecified location    3. Encounter for breast cancer screening using non-mammogram modality        Mammogram.  Return to office 1 year or as needed.  Counseled about continuing calcium with vitamin D and we will recheck DEXA scan next year.

## 2020-10-26 ENCOUNTER — APPOINTMENT (OUTPATIENT)
Dept: WOMENS IMAGING | Facility: HOSPITAL | Age: 65
End: 2020-10-26

## 2020-10-26 ENCOUNTER — PROCEDURE VISIT (OUTPATIENT)
Dept: OBSTETRICS AND GYNECOLOGY | Facility: CLINIC | Age: 65
End: 2020-10-26

## 2020-10-26 DIAGNOSIS — Z12.31 VISIT FOR SCREENING MAMMOGRAM: Primary | ICD-10-CM

## 2020-10-26 LAB
CONV .: NORMAL
CYTOLOGIST CVX/VAG CYTO: NORMAL
CYTOLOGY CVX/VAG DOC CYTO: NORMAL
CYTOLOGY CVX/VAG DOC THIN PREP: NORMAL
DX ICD CODE: NORMAL
HIV 1 & 2 AB SER-IMP: NORMAL
OTHER STN SPEC: NORMAL
STAT OF ADQ CVX/VAG CYTO-IMP: NORMAL

## 2020-10-26 PROCEDURE — 77063 BREAST TOMOSYNTHESIS BI: CPT | Performed by: RADIOLOGY

## 2020-10-26 PROCEDURE — 77063 BREAST TOMOSYNTHESIS BI: CPT | Performed by: OBSTETRICS & GYNECOLOGY

## 2020-10-26 PROCEDURE — 77067 SCR MAMMO BI INCL CAD: CPT | Performed by: RADIOLOGY

## 2020-10-26 PROCEDURE — 77067 SCR MAMMO BI INCL CAD: CPT | Performed by: OBSTETRICS & GYNECOLOGY

## 2020-11-16 ENCOUNTER — HOSPITAL ENCOUNTER (EMERGENCY)
Facility: HOSPITAL | Age: 65
Discharge: HOME | End: 2020-11-16
Attending: EMERGENCY MEDICINE | Admitting: EMERGENCY MEDICINE

## 2020-11-16 VITALS
RESPIRATION RATE: 18 BRPM | OXYGEN SATURATION: 95 % | DIASTOLIC BLOOD PRESSURE: 87 MMHG | TEMPERATURE: 99.3 F | HEART RATE: 104 BPM | SYSTOLIC BLOOD PRESSURE: 102 MMHG

## 2020-11-16 DIAGNOSIS — B34.9 VIRAL ILLNESS: Primary | ICD-10-CM

## 2020-11-16 PROCEDURE — 99282 EMERGENCY DEPT VISIT SF MDM: CPT

## 2020-11-16 NOTE — ED NOTES
"Pt and spouse are waiting in their vehicle. The pt's spouse was diagnosed with COVID \"a week ago Saturday\".     Ophelia Smith [phone]  Hector Smith [phone]     Toyin Acuna, RN  11/16/20 1155    "

## 2020-11-17 NOTE — ED NOTES
Patient complaining of general weakness, fatigue and cough since Friday. Patient denies soa, nausea, vomiting, diarrhea.     Fay Robbins RN  11/16/20 2025

## 2020-11-17 NOTE — ED PROVIDER NOTES
EMERGENCY DEPARTMENT ENCOUNTER      Room Number: 3/03    History is provided by the patient, no translation services needed    HPI:    Chief complaint: Fatigue    Location: Generalized    Quality/Severity:     Timing/Duration: 4 days/constant    Modifying Factors: Spouse diagnosed with COVID-19    Associated Symptoms: Nonproductive cough fever    Narrative: Pt is a 65 y.o. female who presents complaining of fatigue, general weakness and nonproductive cough x4 days.  Patient is also complaining of fever.  Patient denies any nausea, vomiting, or diarrhea.  Patient denies any shortness of breath.  Patient states that her  was diagnosed with COVID-19.      PMD: Samantha Song PA    REVIEW OF SYSTEMS  Review of Systems   Constitutional: Positive for chills, fatigue and fever.   Eyes: Negative for pain and visual disturbance.   Respiratory: Negative for cough and shortness of breath.    Cardiovascular: Negative for chest pain and leg swelling.   Gastrointestinal: Negative for abdominal pain, constipation, diarrhea, nausea and vomiting.   Genitourinary: Negative for dysuria and flank pain.   Musculoskeletal: Negative for arthralgias and myalgias.   Skin: Negative for rash and wound.   Neurological: Negative for dizziness, syncope and headaches.   Psychiatric/Behavioral: Negative for suicidal ideas. The patient is not nervous/anxious.          PAST MEDICAL HISTORY  Active Ambulatory Problems     Diagnosis Date Noted   • Gastroenteritis due to norovirus 05/10/2019   • Hypertension 05/10/2019   • GERD (gastroesophageal reflux disease) 05/10/2019     Resolved Ambulatory Problems     Diagnosis Date Noted   • Chest pain 05/08/2019     Past Medical History:   Diagnosis Date   • Arthritis    • Hyperlipidemia    • Migraine    • Plantar fasciitis        PAST SURGICAL HISTORY  Past Surgical History:   Procedure Laterality Date   • CHOLECYSTECTOMY         FAMILY HISTORY  Family History   Problem Relation Age of Onset   •  Breast cancer Maternal Grandmother        SOCIAL HISTORY  Social History     Socioeconomic History   • Marital status:      Spouse name: Not on file   • Number of children: Not on file   • Years of education: Not on file   • Highest education level: Not on file   Tobacco Use   • Smoking status: Never Smoker   • Smokeless tobacco: Never Used   Substance and Sexual Activity   • Alcohol use: No   • Drug use: No   • Sexual activity: Defer       ALLERGIES  Patient has no known allergies.    No current facility-administered medications for this encounter.     Current Outpatient Medications:   •  Acetaminophen 325 MG capsule, Take 1-2 capsules by mouth., Disp: , Rfl:   •  aspirin 81 MG EC tablet, Take 1 tablet by mouth Daily., Disp: 30 tablet, Rfl: 0  •  atorvastatin (LIPITOR) 10 MG tablet, 10 mg Daily., Disp: , Rfl:   •  B Complex-C-E-Zn (b complex-C-E-zinc) tablet, Take 1 tablet by mouth Daily., Disp: , Rfl:   •  Calcium-Magnesium-Vitamin D ER (CALCIUM 1200+D3) 600- MG-MG-UNIT tablet sustained-release 24 hour, Take 1 tablet by mouth Daily., Disp: , Rfl:   •  celecoxib (CeleBREX) 200 MG capsule, , Disp: , Rfl:   •  estradiol (ESTRACE) 0.5 MG tablet, TAKE ONE TABLET BY MOUTH DAILY, Disp: 90 tablet, Rfl: 2  •  lisinopril (PRINIVIL,ZESTRIL) 10 MG tablet, 10 mg Daily., Disp: , Rfl:   •  medroxyPROGESTERone (PROVERA) 2.5 MG tablet, TAKE 1 TABLET BY MOUTH ONCE A DAY, Disp: 90 tablet, Rfl: 1  •  montelukast (SINGULAIR) 10 MG tablet, Take 10 mg by mouth Daily., Disp: , Rfl:   •  Multiple Vitamins-Minerals (MULTIVITAMIN WITH MINERALS) tablet tablet, Take 1 tablet by mouth Daily., Disp: , Rfl:   •  omeprazole (PriLOSEC) 40 MG capsule, 40 mg Daily., Disp: , Rfl:   •  fluorouracil (EFUDEX) 5 % cream, , Disp: , Rfl:   •  SUMAtriptan (IMITREX) 50 MG tablet, Take 50 mg by mouth Every 2 (Two) Hours As Needed for migraine. Take one tablet at onset of headache. May repeat dose one time in 2 hours if headache not relieved.,  Disp: , Rfl:     PHYSICAL EXAM  ED Triage Vitals [11/16/20 1853]   Temp Heart Rate Resp BP SpO2   99.3 °F (37.4 °C) 110 18 135/84 96 %      Temp src Heart Rate Source Patient Position BP Location FiO2 (%)   Oral Monitor Sitting Right arm --       Physical Exam   Constitutional: She is oriented to person, place, and time and well-developed, well-nourished, and in no distress.   HENT:   Head: Normocephalic and atraumatic.   Eyes: Conjunctivae and EOM are normal.   Neck: Normal range of motion. Neck supple.   Cardiovascular: Normal rate, regular rhythm and normal heart sounds.   Pulmonary/Chest: Effort normal and breath sounds normal. No respiratory distress.   Abdominal: Soft. Bowel sounds are normal. She exhibits no distension. There is no abdominal tenderness.   Musculoskeletal: Normal range of motion.         General: No edema.   Neurological: She is alert and oriented to person, place, and time. GCS score is 15.   Skin: Skin is warm and dry.   Psychiatric: Mood and affect normal.   Nursing note and vitals reviewed.        LAB RESULTS  Lab Results (last 24 hours)     ** No results found for the last 24 hours. **                RADIOLOGY  No Radiology Exams Resulted Within Past 24 Hours        PROCEDURES  Procedures      PROGRESS AND CONSULTS  ED Course as of Nov 16 2202   Mon Nov 16, 2020   2150 65-year-old female presenting to the ED with complaints of fatigue and fever.  Patient states that she is also had a nonproductive cough.  Patient states that her  was recently diagnosed with COVID-19.  Patient denies any vomiting or diarrhea.  After history and physical exam, discussed with patient that she had a viral illness presumably COVID-19 due to her exposure.  Instructed patient that she would need to take over-the-counter symptomatic medication.  Also instructed the patient to stay well-hydrated.  Discussed with the patient the importance of her returning to the ED if her symptoms worsen such as shortness  of breath or uncontrollable vomiting.  Patient expressed verbal understanding of plan of care    [GT]      ED Course User Index  [GT] Foreign Acuna PA-C           MEDICAL DECISION MAKING  .     MDM       DIAGNOSIS  Final diagnoses:   Viral illness       Latest Documented Vital Signs:  As of 22:02 EST  BP- 102/87 HR- 104 Temp- 99.3 °F (37.4 °C) (Oral) O2 sat- 95%    DISPOSITION  Discharge home         Return to the emergency Department warnings were given.         Medication List      No changes were made to your prescriptions during this visit.             Follow-up Information     Samantha Song PA. Call in 1 day.    Specialty: Physician Assistant  Why: To schedule a follow up appointment  Contact information:  [address] [facility] [address]  [phone]                     Dictated utilizing Dragon dictation     Foreign Acuna PA-C  11/16/20 8691

## 2021-01-07 ENCOUNTER — APPOINTMENT (OUTPATIENT)
Dept: NUCLEAR MEDICINE | Facility: HOSPITAL | Age: 66
End: 2021-01-07

## 2021-01-07 ENCOUNTER — HOSPITAL ENCOUNTER (OUTPATIENT)
Facility: HOSPITAL | Age: 66
Setting detail: OBSERVATION
Discharge: HOME | End: 2021-01-08
Attending: EMERGENCY MEDICINE | Admitting: INTERNAL MEDICINE

## 2021-01-07 ENCOUNTER — APPOINTMENT (OUTPATIENT)
Dept: CT IMAGING | Facility: HOSPITAL | Age: 66
End: 2021-01-07

## 2021-01-07 ENCOUNTER — APPOINTMENT (OUTPATIENT)
Dept: GENERAL RADIOLOGY | Facility: HOSPITAL | Age: 66
End: 2021-01-07

## 2021-01-07 DIAGNOSIS — R07.9 CHEST PAIN, UNSPECIFIED TYPE: Primary | ICD-10-CM

## 2021-01-07 LAB
ALBUMIN SERPL-MCNC: 4.4 G/DL (ref 3.5–5.2)
ALBUMIN/GLOB SERPL: 1.2 G/DL
ALP SERPL-CCNC: 96 U/L (ref 39–117)
ALT SERPL W P-5'-P-CCNC: 21 U/L (ref 1–33)
ANION GAP SERPL CALCULATED.3IONS-SCNC: 10.1 MMOL/L (ref 5–15)
AST SERPL-CCNC: 19 U/L (ref 1–32)
BASOPHILS # BLD AUTO: 0.06 10*3/MM3 (ref 0–0.2)
BASOPHILS NFR BLD AUTO: 0.7 % (ref 0–1.5)
BILIRUB SERPL-MCNC: 0.6 MG/DL (ref 0–1.2)
BILIRUB UR QL STRIP: NEGATIVE
BUN SERPL-MCNC: 16 MG/DL (ref 8–23)
BUN/CREAT SERPL: 20 (ref 7–25)
CALCIUM SPEC-SCNC: 10 MG/DL (ref 8.6–10.5)
CHLORIDE SERPL-SCNC: 99 MMOL/L (ref 98–107)
CLARITY UR: CLEAR
CO2 SERPL-SCNC: 25.9 MMOL/L (ref 22–29)
COLOR UR: YELLOW
CREAT SERPL-MCNC: 0.8 MG/DL (ref 0.57–1)
D DIMER PPP FEU-MCNC: 1.12 MCGFEU/ML (ref 0–0.46)
DEPRECATED RDW RBC AUTO: 41.2 FL (ref 37–54)
EOSINOPHIL # BLD AUTO: 0.14 10*3/MM3 (ref 0–0.4)
EOSINOPHIL NFR BLD AUTO: 1.7 % (ref 0.3–6.2)
ERYTHROCYTE [DISTWIDTH] IN BLOOD BY AUTOMATED COUNT: 12.7 % (ref 12.3–15.4)
GFR SERPL CREATININE-BSD FRML MDRD: 72 ML/MIN/1.73
GLOBULIN UR ELPH-MCNC: 3.6 GM/DL
GLUCOSE SERPL-MCNC: 95 MG/DL (ref 65–99)
GLUCOSE UR STRIP-MCNC: NEGATIVE MG/DL
HCT VFR BLD AUTO: 45.1 % (ref 34–46.6)
HGB BLD-MCNC: 14.9 G/DL (ref 12–15.9)
HGB UR QL STRIP.AUTO: NEGATIVE
IMM GRANULOCYTES # BLD AUTO: 0.02 10*3/MM3 (ref 0–0.05)
IMM GRANULOCYTES NFR BLD AUTO: 0.2 % (ref 0–0.5)
INR PPP: 1.1 (ref 0.9–1.1)
KETONES UR QL STRIP: NEGATIVE
LEUKOCYTE ESTERASE UR QL STRIP.AUTO: NEGATIVE
LYMPHOCYTES # BLD AUTO: 2.65 10*3/MM3 (ref 0.7–3.1)
LYMPHOCYTES NFR BLD AUTO: 31.5 % (ref 19.6–45.3)
MCH RBC QN AUTO: 29.4 PG (ref 26.6–33)
MCHC RBC AUTO-ENTMCNC: 33 G/DL (ref 31.5–35.7)
MCV RBC AUTO: 89 FL (ref 79–97)
MONOCYTES # BLD AUTO: 0.63 10*3/MM3 (ref 0.1–0.9)
MONOCYTES NFR BLD AUTO: 7.5 % (ref 5–12)
NEUTROPHILS NFR BLD AUTO: 4.92 10*3/MM3 (ref 1.7–7)
NEUTROPHILS NFR BLD AUTO: 58.4 % (ref 42.7–76)
NITRITE UR QL STRIP: NEGATIVE
NRBC BLD AUTO-RTO: 0 /100 WBC (ref 0–0.2)
NT-PROBNP SERPL-MCNC: 20.3 PG/ML (ref 0–900)
PH UR STRIP.AUTO: 7 [PH] (ref 4.5–8)
PLATELET # BLD AUTO: 277 10*3/MM3 (ref 140–450)
PMV BLD AUTO: 9.8 FL (ref 6–12)
POTASSIUM SERPL-SCNC: 4.2 MMOL/L (ref 3.5–5.2)
PROT SERPL-MCNC: 8 G/DL (ref 6–8.5)
PROT UR QL STRIP: NEGATIVE
PROTHROMBIN TIME: 13.9 SECONDS (ref 12.1–15)
RBC # BLD AUTO: 5.07 10*6/MM3 (ref 3.77–5.28)
SARS-COV-2 RNA PNL SPEC NAA+PROBE: NOT DETECTED
SODIUM SERPL-SCNC: 135 MMOL/L (ref 136–145)
SP GR UR STRIP: 1.01 (ref 1–1.03)
TROPONIN T SERPL-MCNC: <0.01 NG/ML (ref 0–0.03)
TROPONIN T SERPL-MCNC: <0.01 NG/ML (ref 0–0.03)
UROBILINOGEN UR QL STRIP: NORMAL
WBC # BLD AUTO: 8.42 10*3/MM3 (ref 3.4–10.8)

## 2021-01-07 PROCEDURE — 96374 THER/PROPH/DIAG INJ IV PUSH: CPT

## 2021-01-07 PROCEDURE — G0378 HOSPITAL OBSERVATION PER HR: HCPCS

## 2021-01-07 PROCEDURE — 25010000002 MORPHINE PER 10 MG: Performed by: EMERGENCY MEDICINE

## 2021-01-07 PROCEDURE — 71045 X-RAY EXAM CHEST 1 VIEW: CPT

## 2021-01-07 PROCEDURE — 99220 PR INITIAL OBSERVATION CARE/DAY 70 MINUTES: CPT | Performed by: INTERNAL MEDICINE

## 2021-01-07 PROCEDURE — A9540 TC99M MAA: HCPCS | Performed by: EMERGENCY MEDICINE

## 2021-01-07 PROCEDURE — 25010000002 ONDANSETRON PER 1 MG: Performed by: EMERGENCY MEDICINE

## 2021-01-07 PROCEDURE — 93005 ELECTROCARDIOGRAM TRACING: CPT | Performed by: EMERGENCY MEDICINE

## 2021-01-07 PROCEDURE — 81003 URINALYSIS AUTO W/O SCOPE: CPT | Performed by: EMERGENCY MEDICINE

## 2021-01-07 PROCEDURE — 99284 EMERGENCY DEPT VISIT MOD MDM: CPT

## 2021-01-07 PROCEDURE — 93010 ELECTROCARDIOGRAM REPORT: CPT | Performed by: INTERNAL MEDICINE

## 2021-01-07 PROCEDURE — 80053 COMPREHEN METABOLIC PANEL: CPT | Performed by: EMERGENCY MEDICINE

## 2021-01-07 PROCEDURE — 96372 THER/PROPH/DIAG INJ SC/IM: CPT

## 2021-01-07 PROCEDURE — 84484 ASSAY OF TROPONIN QUANT: CPT | Performed by: INTERNAL MEDICINE

## 2021-01-07 PROCEDURE — 85025 COMPLETE CBC W/AUTO DIFF WBC: CPT | Performed by: EMERGENCY MEDICINE

## 2021-01-07 PROCEDURE — 78580 LUNG PERFUSION IMAGING: CPT

## 2021-01-07 PROCEDURE — 87635 SARS-COV-2 COVID-19 AMP PRB: CPT | Performed by: EMERGENCY MEDICINE

## 2021-01-07 PROCEDURE — 96375 TX/PRO/DX INJ NEW DRUG ADDON: CPT

## 2021-01-07 PROCEDURE — 84484 ASSAY OF TROPONIN QUANT: CPT | Performed by: EMERGENCY MEDICINE

## 2021-01-07 PROCEDURE — 0 TECHNETIUM ALBUMIN AGGREGATED: Performed by: EMERGENCY MEDICINE

## 2021-01-07 PROCEDURE — 85610 PROTHROMBIN TIME: CPT | Performed by: EMERGENCY MEDICINE

## 2021-01-07 PROCEDURE — 25010000002 ENOXAPARIN PER 10 MG: Performed by: EMERGENCY MEDICINE

## 2021-01-07 PROCEDURE — 85379 FIBRIN DEGRADATION QUANT: CPT | Performed by: EMERGENCY MEDICINE

## 2021-01-07 PROCEDURE — 83880 ASSAY OF NATRIURETIC PEPTIDE: CPT | Performed by: EMERGENCY MEDICINE

## 2021-01-07 PROCEDURE — 99283 EMERGENCY DEPT VISIT LOW MDM: CPT | Performed by: EMERGENCY MEDICINE

## 2021-01-07 RX ORDER — ONDANSETRON 2 MG/ML
4 INJECTION INTRAMUSCULAR; INTRAVENOUS ONCE
Status: COMPLETED | OUTPATIENT
Start: 2021-01-07 | End: 2021-01-07

## 2021-01-07 RX ORDER — ATORVASTATIN CALCIUM 10 MG/1
10 TABLET, FILM COATED ORAL NIGHTLY
Status: DISCONTINUED | OUTPATIENT
Start: 2021-01-07 | End: 2021-01-08

## 2021-01-07 RX ORDER — ONDANSETRON 2 MG/ML
4 INJECTION INTRAMUSCULAR; INTRAVENOUS EVERY 6 HOURS PRN
Status: DISCONTINUED | OUTPATIENT
Start: 2021-01-07 | End: 2021-01-08 | Stop reason: HOSPADM

## 2021-01-07 RX ORDER — ASPIRIN 81 MG/1
81 TABLET ORAL DAILY
Status: DISCONTINUED | OUTPATIENT
Start: 2021-01-07 | End: 2021-01-08 | Stop reason: HOSPADM

## 2021-01-07 RX ORDER — LISINOPRIL 10 MG/1
10 TABLET ORAL DAILY
Status: DISCONTINUED | OUTPATIENT
Start: 2021-01-08 | End: 2021-01-08 | Stop reason: HOSPADM

## 2021-01-07 RX ORDER — ALUMINA, MAGNESIA, AND SIMETHICONE 2400; 2400; 240 MG/30ML; MG/30ML; MG/30ML
15 SUSPENSION ORAL EVERY 6 HOURS PRN
Status: DISCONTINUED | OUTPATIENT
Start: 2021-01-07 | End: 2021-01-08 | Stop reason: HOSPADM

## 2021-01-07 RX ORDER — ACETAMINOPHEN 325 MG/1
650 TABLET ORAL EVERY 4 HOURS PRN
Status: DISCONTINUED | OUTPATIENT
Start: 2021-01-07 | End: 2021-01-08 | Stop reason: HOSPADM

## 2021-01-07 RX ORDER — SODIUM CHLORIDE 0.9 % (FLUSH) 0.9 %
10 SYRINGE (ML) INJECTION AS NEEDED
Status: DISCONTINUED | OUTPATIENT
Start: 2021-01-07 | End: 2021-01-08 | Stop reason: HOSPADM

## 2021-01-07 RX ORDER — ASPIRIN 81 MG/1
324 TABLET, CHEWABLE ORAL ONCE
Status: DISCONTINUED | OUTPATIENT
Start: 2021-01-07 | End: 2021-01-07

## 2021-01-07 RX ORDER — NITROGLYCERIN 0.4 MG/1
0.4 TABLET SUBLINGUAL
Status: DISCONTINUED | OUTPATIENT
Start: 2021-01-07 | End: 2021-01-08 | Stop reason: HOSPADM

## 2021-01-07 RX ORDER — SODIUM CHLORIDE 9 MG/ML
40 INJECTION, SOLUTION INTRAVENOUS AS NEEDED
Status: DISCONTINUED | OUTPATIENT
Start: 2021-01-07 | End: 2021-01-08 | Stop reason: HOSPADM

## 2021-01-07 RX ORDER — ACETAMINOPHEN 160 MG/5ML
650 SOLUTION ORAL EVERY 4 HOURS PRN
Status: DISCONTINUED | OUTPATIENT
Start: 2021-01-07 | End: 2021-01-08 | Stop reason: HOSPADM

## 2021-01-07 RX ORDER — ONDANSETRON 4 MG/1
4 TABLET, FILM COATED ORAL EVERY 6 HOURS PRN
Status: DISCONTINUED | OUTPATIENT
Start: 2021-01-07 | End: 2021-01-08 | Stop reason: HOSPADM

## 2021-01-07 RX ORDER — MULTIPLE VITAMINS W/ MINERALS TAB 9MG-400MCG
1 TAB ORAL DAILY
Status: DISCONTINUED | OUTPATIENT
Start: 2021-01-07 | End: 2021-01-08 | Stop reason: HOSPADM

## 2021-01-07 RX ORDER — BISACODYL 10 MG
10 SUPPOSITORY, RECTAL RECTAL DAILY PRN
Status: DISCONTINUED | OUTPATIENT
Start: 2021-01-07 | End: 2021-01-08 | Stop reason: HOSPADM

## 2021-01-07 RX ORDER — LORAZEPAM 2 MG/ML
0.5 INJECTION INTRAMUSCULAR ONCE
Status: DISCONTINUED | OUTPATIENT
Start: 2021-01-07 | End: 2021-01-07

## 2021-01-07 RX ORDER — ACETAMINOPHEN 650 MG/1
650 SUPPOSITORY RECTAL EVERY 4 HOURS PRN
Status: DISCONTINUED | OUTPATIENT
Start: 2021-01-07 | End: 2021-01-08 | Stop reason: HOSPADM

## 2021-01-07 RX ORDER — ASPIRIN 81 MG/1
243 TABLET, CHEWABLE ORAL ONCE
Status: COMPLETED | OUTPATIENT
Start: 2021-01-07 | End: 2021-01-07

## 2021-01-07 RX ORDER — SUMATRIPTAN 25 MG/1
50 TABLET, FILM COATED ORAL
Status: DISCONTINUED | OUTPATIENT
Start: 2021-01-07 | End: 2021-01-08 | Stop reason: HOSPADM

## 2021-01-07 RX ORDER — MONTELUKAST SODIUM 10 MG/1
10 TABLET ORAL DAILY
Status: DISCONTINUED | OUTPATIENT
Start: 2021-01-07 | End: 2021-01-08 | Stop reason: HOSPADM

## 2021-01-07 RX ORDER — SODIUM CHLORIDE 0.9 % (FLUSH) 0.9 %
10 SYRINGE (ML) INJECTION EVERY 12 HOURS SCHEDULED
Status: DISCONTINUED | OUTPATIENT
Start: 2021-01-07 | End: 2021-01-08 | Stop reason: HOSPADM

## 2021-01-07 RX ORDER — BISACODYL 5 MG/1
5 TABLET, DELAYED RELEASE ORAL DAILY PRN
Status: DISCONTINUED | OUTPATIENT
Start: 2021-01-07 | End: 2021-01-08 | Stop reason: HOSPADM

## 2021-01-07 RX ORDER — PANTOPRAZOLE SODIUM 40 MG/1
40 TABLET, DELAYED RELEASE ORAL EVERY MORNING
Status: DISCONTINUED | OUTPATIENT
Start: 2021-01-08 | End: 2021-01-08 | Stop reason: HOSPADM

## 2021-01-07 RX ADMIN — NITROGLYCERIN 1 INCH: 20 OINTMENT TOPICAL at 16:59

## 2021-01-07 RX ADMIN — MORPHINE SULFATE 4 MG: 4 INJECTION INTRAVENOUS at 17:02

## 2021-01-07 RX ADMIN — NITROGLYCERIN 0.4 MG: 0.4 TABLET SUBLINGUAL at 13:56

## 2021-01-07 RX ADMIN — SODIUM CHLORIDE, PRESERVATIVE FREE 10 ML: 5 INJECTION INTRAVENOUS at 21:30

## 2021-01-07 RX ADMIN — ONDANSETRON 4 MG: 2 INJECTION, SOLUTION INTRAMUSCULAR; INTRAVENOUS at 17:01

## 2021-01-07 RX ADMIN — NITROGLYCERIN 0.4 MG: 0.4 TABLET SUBLINGUAL at 13:44

## 2021-01-07 RX ADMIN — ENOXAPARIN SODIUM 70 MG: 80 INJECTION SUBCUTANEOUS at 17:03

## 2021-01-07 RX ADMIN — SODIUM CHLORIDE, POTASSIUM CHLORIDE, SODIUM LACTATE AND CALCIUM CHLORIDE 500 ML: 600; 310; 30; 20 INJECTION, SOLUTION INTRAVENOUS at 13:33

## 2021-01-07 RX ADMIN — ASPIRIN 81 MG CHEWABLE TABLET 243 MG: 81 TABLET CHEWABLE at 13:44

## 2021-01-07 RX ADMIN — KIT FOR THE PREPARATION OF TECHNETIUM TC 99M ALBUMIN AGGREGATED 1 DOSE: 2.5 INJECTION, POWDER, FOR SOLUTION INTRAVENOUS at 16:11

## 2021-01-07 NOTE — PLAN OF CARE
Goal Outcome Evaluation:  Plan of Care Reviewed With: patient  Progress: improving  Outcome Summary: Denies chest pain; telemetry shows SR 80s-90s; echo tomorrow; labs in am; troponin q 6 hrs

## 2021-01-07 NOTE — ED NOTES
Call placed to cardiology. Answering service said they will page Dr. Lindsey and will call back.      Ander Dotson  01/07/21 0399

## 2021-01-07 NOTE — H&P
[facility] HOSPITALIST     Samantha Song PA    CHIEF COMPLAINT:     Chest pain    HISTORY OF PRESENT ILLNESS:    Pleasant 65-year-old female presented to the ER for a history of chest pain that started on Saturday.  Ever since Saturday she has had intermittent substernal chest pressure associated with some shortness of air with the pressure.  She is not had any real dyspnea on exertion.  There is also some associated nausea.  The pain was becoming more frequent and was unrelieved with therapies at home.  She tried to make appointment with her primary but they had no appointments so she came to the ER for evaluation.  In the ER D-dimer was elevated they attempted CTA but had trouble with access and had a VQ scan that was negative.  ER discussed with cardiology and decision was made for admission here.  Of note she had a negative echocardiogram and stress test in May 2019.  She also had Covid along with her  in November but she has recovered well from this with no symptoms.      Past Medical History:   Diagnosis Date   • Arthritis    • GERD (gastroesophageal reflux disease)    • Hyperlipidemia    • Hypertension    • Migraine    • Plantar fasciitis      Past Surgical History:   Procedure Laterality Date   • CHOLECYSTECTOMY       Family History   Problem Relation Age of Onset   • Breast cancer Maternal Grandmother      Social History     Tobacco Use   • Smoking status: Never Smoker   • Smokeless tobacco: Never Used   Substance Use Topics   • Alcohol use: No   • Drug use: No     (Not in a hospital admission)    Allergies:  Patient has no known allergies.    Immunization History   Administered Date(s) Administered   • TD Preservative Free 01/06/2017           REVIEW OF SYSTEMS:  Please see the above history of present illness for pertinent positives and negatives.  The remainder of the patient's systems have been reviewed and are negative.     Objective     Vital Signs  Temp:  [98 °F (36.7  "°C)] 98 °F (36.7 °C)  Heart Rate:  [83-93] 88  Resp:  [16] 16  BP: (134-151)/(75-93) 151/75    Flowsheet Rows      First Filed Value   Admission Height  162.6 cm (64\") Documented at 01/07/2021 1313   Admission Weight  68.4 kg (150 lb 11.2 oz) Documented at 01/07/2021 1313           Physical Exam:  Physical Exam  Vitals signs reviewed.   HENT:      Mouth/Throat:      Pharynx: Oropharynx is clear.   Eyes:      Pupils: Pupils are equal, round, and reactive to light.   Neck:      Musculoskeletal: Neck supple.   Cardiovascular:      Rate and Rhythm: Normal rate and regular rhythm.   Pulmonary:      Effort: Pulmonary effort is normal. No respiratory distress.   Abdominal:      General: There is no distension.      Palpations: Abdomen is soft.      Tenderness: There is no abdominal tenderness.   Musculoskeletal:         General: No swelling.   Neurological:      General: No focal deficit present.      Mental Status: She is alert and oriented to person, place, and time.   Psychiatric:         Mood and Affect: Mood normal.         Behavior: Behavior normal.            Results Review:    I reviewed the patient's new clinical results.  Lab Results (most recent)     Procedure Component Value Units Date/Time    BNP [[id number]]  (Normal) Collected: 01/07/21 1328    Specimen: Blood Updated: 01/07/21 1415     proBNP 20.3 pg/mL     Narrative:      Among patients with dyspnea, NT-proBNP is highly sensitive for the detection of acute congestive heart failure. In addition NT-proBNP of <300 pg/ml effectively rules out acute congestive heart failure with 99% negative predictive value.    Results may be falsely decreased if patient taking Biotin.      Urinalysis With Microscopic If Indicated (No Culture) - Urine, Clean Catch [[id number]]  (Normal) Collected: 01/07/21 1330    Specimen: Urine, Clean Catch Updated: 01/07/21 1402     Color, UA Yellow     Appearance, UA Clear     pH, UA 7.0     Specific Gravity, UA 1.015     Glucose, UA " Negative     Ketones, UA Negative     Bilirubin, UA Negative     Blood, UA Negative     Protein, UA Negative     Leuk Esterase, UA Negative     Nitrite, UA Negative     Urobilinogen, UA 0.2 E.U./dL    Narrative:      Urine microscopic not indicated.    Troponin [[id number]]  (Normal) Collected: 01/07/21 1328    Specimen: Blood Updated: 01/07/21 1356     Troponin T <0.010 ng/mL     Narrative:      Troponin T Reference Range:  <= 0.03 ng/mL-   Negative for AMI  >0.03 ng/mL-     Abnormal for myocardial necrosis.  Clinicians would have to utilize clinical acumen, EKG, Troponin and serial changes to determine if it is an Acute Myocardial Infarction or myocardial injury due to an underlying chronic condition.       Results may be falsely decreased if patient taking Biotin.      Comprehensive Metabolic Panel [[id number]]  (Abnormal) Collected: 01/07/21 1328    Specimen: Blood Updated: 01/07/21 1353     Glucose 95 mg/dL      BUN 16 mg/dL      Creatinine 0.80 mg/dL      Sodium 135 mmol/L      Potassium 4.2 mmol/L      Chloride 99 mmol/L      CO2 25.9 mmol/L      Calcium 10.0 mg/dL      Total Protein 8.0 g/dL      Albumin 4.40 g/dL      ALT (SGPT) 21 U/L      AST (SGOT) 19 U/L      Alkaline Phosphatase 96 U/L      Total Bilirubin 0.6 mg/dL      eGFR Non African Amer 72 mL/min/1.73      Globulin 3.6 gm/dL      A/G Ratio 1.2 g/dL      BUN/Creatinine Ratio 20.0     Anion Gap 10.1 mmol/L     Narrative:      GFR Normal >60  Chronic Kidney Disease <60  Kidney Failure <15      D-dimer, Quantitative [[id number]]  (Abnormal) Collected: 01/07/21 1328    Specimen: Blood Updated: 01/07/21 1347     D-Dimer, Quantitative 1.12 MCGFEU/mL     Narrative:      Can be elevated in, but is not diagnostic for deep vein thrombosis (DVT) or pulmonary embolis (PE).  It is also elevated in other medical conditions.  Clinical correlation is required.  The negative cut-off value for the D-Dimer is 0.50 mcg FEU/mL for DVT and PE.      Protime-INR [[id number]]   (Normal) Collected: 01/07/21 1328    Specimen: Blood Updated: 01/07/21 1346     Protime 13.9 Seconds      INR 1.10    Narrative:      Therapeutic Ranges for INR: 2.0-3.0 (PT 20-30)                              2.5-3.5 (PT 25-34)    CBC & Differential [[id number]]  (Normal) Collected: 01/07/21 1328    Specimen: Blood Updated: 01/07/21 1334    Narrative:      The following orders were created for panel order CBC & Differential.  Procedure                               Abnormality         Status                     ---------                               -----------         ------                     CBC Auto Differential[[id number]]        Normal              Final result                 Please view results for these tests on the individual orders.    CBC Auto Differential [[id number]]  (Normal) Collected: 01/07/21 1328    Specimen: Blood Updated: 01/07/21 1334     WBC 8.42 10*3/mm3      RBC 5.07 10*6/mm3      Hemoglobin 14.9 g/dL      Hematocrit 45.1 %      MCV 89.0 fL      MCH 29.4 pg      MCHC 33.0 g/dL      RDW 12.7 %      RDW-SD 41.2 fl      MPV 9.8 fL      Platelets 277 10*3/mm3      Neutrophil % 58.4 %      Lymphocyte % 31.5 %      Monocyte % 7.5 %      Eosinophil % 1.7 %      Basophil % 0.7 %      Immature Grans % 0.2 %      Neutrophils, Absolute 4.92 10*3/mm3      Lymphocytes, Absolute 2.65 10*3/mm3      Monocytes, Absolute 0.63 10*3/mm3      Eosinophils, Absolute 0.14 10*3/mm3      Basophils, Absolute 0.06 10*3/mm3      Immature Grans, Absolute 0.02 10*3/mm3      nRBC 0.0 /100 WBC           Imaging Results (Most Recent)     Procedure Component Value Units Date/Time    NM Lung Scan Perfusion Particulate [[id number]] Collected: 01/07/21 1617     Updated: 01/07/21 1620    Narrative:      PERFUSION STUDY OF THE LUNGS 01/07/2021           HISTORY:   Chest pain shortness of breath for 7 days. Elevated d-dimer.     DOSE:   5.7 mCi of technetium 99m MAA.     COMPARISON:   Chest x-ray from today     FINDINGS:   The  perfusion images are normal       Impression:      Normal study. No perfusion defect is identified     This report was finalized on 1/7/2021 4:18 PM by Dr. Sudeep Clay MD.       XR Chest 1 View [[id number]] Collected: 01/07/21 1554     Updated: 01/07/21 1556    Narrative:      AP PORTABLE CHEST, 01/07/2021     HISTORY:  Chest pain shortness of air for one week     COMPARISON:  05/08/2019     TECHNIQUE:  AP portable chest x-ray.     FINDINGS:  Heart size and pulmonary vascularity are normal. The lungs are expanded  and clear. No visible pulmonary infiltrate or pleural effusion.           Impression:      Negative single view chest     This report was finalized on 1/7/2021 3:54 PM by Dr. Sudeep Clay MD.           reviewed personally negative chest x-ray    ECG/EMG Results (most recent)     Procedure Component Value Units Date/Time    ECG 12 Lead [[id number]] Collected: 01/07/21 1318     Updated: 01/07/21 1320     QT Interval 340 ms     Narrative:      HEART RATE= 90  bpm  RR Interval= 664  ms  NM Interval= 187  ms  P Horizontal Axis= 2  deg  P Front Axis= 67  deg  QRSD Interval= 75  ms  QT Interval= 340  ms  QRS Axis= 5  deg  T Wave Axis= 49  deg  - NORMAL ECG -  Sinus rhythm  Electronically Signed By:   Date and Time of Study: 2021-01-07 13:18:20    ECG 12 Lead [[id number]] Collected: 01/07/21 1349     Updated: 01/07/21 1351     QT Interval 337 ms     Narrative:      HEART RATE= 100  bpm  RR Interval= 600  ms  NM Interval= 179  ms  P Horizontal Axis= 13  deg  P Front Axis= 49  deg  QRSD Interval= 77  ms  QT Interval= 337  ms  QRS Axis= 10  deg  T Wave Axis= 41  deg  - OTHERWISE NORMAL ECG -  Sinus tachycardia  Electronically Signed By:   Date and Time of Study: 2021-01-07 13:49:04        reviewed personally with sinus tachycardia but no overt ST changes      Assessment/Plan     Active Hospital Problems:  Active Hospital Problems    Diagnosis  POA   • Chest pain [R07.9]  Yes      Resolved Hospital Problems   No resolved  problems to display.     Chest pain eval ACS  -Initial troponin negative, trend  -VQ scan negative  -Echo  -Continue statin aspirin  -Consult cardiology (per notes from prior admit for stress test if chest pain recurred they may consider heart cath)    HTN HLD  -Chronic home medications    GERD  -Continue PPI    High risk      DVT ppx-SCD    This patient has been examined wearing appropriate Personal Protective Equipment. 01/07/21      I discussed the patient's findings and my recommendations with patient .     Electronically signed by Obed Dukes DO, 01/07/21, 17:37 EST.

## 2021-01-07 NOTE — ED NOTES
pts  called for another update. Let him know that she just went for another scan and we will update his when we know something     Tyra Manrique  01/07/21 4514

## 2021-01-07 NOTE — ED NOTES
Pt back in ED, notified that her  tried to call her and she can call him back      Tyra Manrique  01/07/21 1233

## 2021-01-07 NOTE — ED NOTES
Pts  called wanting an update when possible. Hector Smith [phone]     Ander Dotson  01/07/21 0256

## 2021-01-07 NOTE — ED NOTES
Unable to get a 18g in AC with ultrasound. Pt  updated on status, and MD notified      Grace Perla, RN  01/07/21 4642

## 2021-01-07 NOTE — ED PROVIDER NOTES
EMERGENCY DEPARTMENT ENCOUNTER      Room Number: 06/06      HPI:    Chief complaint: Chest pain    Location: Sternal chest    Quality/Severity: Sometimes pressure-like, sometimes burning, moderate    Timing/Duration: Started on Saturday has come and gone since then    Modifying Factors: Denies worsening with exertion, denies worsening or improving feeding    Associated Symptoms: Dyspnea, nausea    Narrative: Pt is a 65 y.o. female who presents complaining of chest pain described as pressure-like sometimes and burning at other times that feels like it radiates to the top of her chest.  When it started it was associated with some nausea and some shortness of breath.  She says that it is coming on since that time but does not have any association with exertion.  She says that she tried to see her doctor but there was no appointment so she came to the ER.  Currently she says she is having the pain and it is stayed in the same location in the center of her chest.  She reports that she had a cardiac evaluation consisting of a stress test and echocardiogram in May 2019 and has never followed up with a cardiologist since that time.  Risk factors reported include age, family history and hypertension/hyperlipidemia    PMD: Samantha Song PA    REVIEW OF SYSTEMS  Review of Systems   Constitutional: Negative for activity change, appetite change, chills and fever.   Respiratory: Positive for shortness of breath. Negative for chest tightness.    Cardiovascular: Positive for chest pain. Negative for leg swelling.   Gastrointestinal: Negative for abdominal pain, nausea and vomiting.   Genitourinary: Negative for difficulty urinating and dysuria.   Musculoskeletal: Negative for arthralgias and joint swelling.   Skin: Negative for color change and rash.   Neurological: Negative for dizziness and weakness.   Hematological: Negative for adenopathy. Does not bruise/bleed easily.   Psychiatric/Behavioral: Negative for agitation and  confusion.       PAST MEDICAL HISTORY  Active Ambulatory Problems     Diagnosis Date Noted   • Gastroenteritis due to norovirus 05/10/2019   • Hypertension 05/10/2019   • GERD (gastroesophageal reflux disease) 05/10/2019     Resolved Ambulatory Problems     Diagnosis Date Noted   • Chest pain 05/08/2019     Past Medical History:   Diagnosis Date   • Arthritis    • Hyperlipidemia    • Migraine    • Plantar fasciitis        PAST SURGICAL HISTORY  Past Surgical History:   Procedure Laterality Date   • CHOLECYSTECTOMY         FAMILY HISTORY  Family History   Problem Relation Age of Onset   • Breast cancer Maternal Grandmother        SOCIAL HISTORY  Social History     Socioeconomic History   • Marital status:      Spouse name: Not on file   • Number of children: Not on file   • Years of education: Not on file   • Highest education level: Not on file   Tobacco Use   • Smoking status: Never Smoker   • Smokeless tobacco: Never Used   Substance and Sexual Activity   • Alcohol use: No   • Drug use: No   • Sexual activity: Defer       ALLERGIES  Patient has no known allergies.      Current Facility-Administered Medications:   •  LORazepam (ATIVAN) injection 0.5 mg, 0.5 mg, Intravenous, Once, Jason Drummond MD, Stopped at 01/07/21 1453  •  nitroglycerin (NITROSTAT) SL tablet 0.4 mg, 0.4 mg, Sublingual, Q5 Min PRN, Jason Drummond MD, 0.4 mg at 01/07/21 1356  •  [COMPLETED] Insert peripheral IV, , , Once **AND** sodium chloride 0.9 % flush 10 mL, 10 mL, Intravenous, PRN, Jason Drummond MD    Current Outpatient Medications:   •  Acetaminophen 325 MG capsule, Take 1-2 capsules by mouth., Disp: , Rfl:   •  aspirin 81 MG EC tablet, Take 1 tablet by mouth Daily., Disp: 30 tablet, Rfl: 0  •  atorvastatin (LIPITOR) 10 MG tablet, 10 mg Daily., Disp: , Rfl:   •  B Complex-C-E-Zn (b complex-C-E-zinc) tablet, Take 1 tablet by mouth Daily., Disp: , Rfl:   •  Calcium-Magnesium-Vitamin D ER (CALCIUM 1200+D3) 600- MG-MG-UNIT  tablet sustained-release 24 hour, Take 1 tablet by mouth Daily., Disp: , Rfl:   •  celecoxib (CeleBREX) 200 MG capsule, , Disp: , Rfl:   •  estradiol (ESTRACE) 0.5 MG tablet, TAKE ONE TABLET BY MOUTH DAILY, Disp: 90 tablet, Rfl: 2  •  fluorouracil (EFUDEX) 5 % cream, , Disp: , Rfl:   •  lisinopril (PRINIVIL,ZESTRIL) 10 MG tablet, 10 mg Daily., Disp: , Rfl:   •  medroxyPROGESTERone (PROVERA) 2.5 MG tablet, TAKE 1 TABLET BY MOUTH ONCE A DAY, Disp: 90 tablet, Rfl: 1  •  montelukast (SINGULAIR) 10 MG tablet, Take 10 mg by mouth Daily., Disp: , Rfl:   •  Multiple Vitamins-Minerals (MULTIVITAMIN WITH MINERALS) tablet tablet, Take 1 tablet by mouth Daily., Disp: , Rfl:   •  omeprazole (PriLOSEC) 40 MG capsule, 40 mg Daily., Disp: , Rfl:   •  SUMAtriptan (IMITREX) 50 MG tablet, Take 50 mg by mouth Every 2 (Two) Hours As Needed for migraine. Take one tablet at onset of headache. May repeat dose one time in 2 hours if headache not relieved., Disp: , Rfl:     PHYSICAL EXAM  ED Triage Vitals [01/07/21 1313]   Temp Heart Rate Resp BP SpO2   98 °F (36.7 °C) 90 16 147/90 100 %      Temp src Heart Rate Source Patient Position BP Location FiO2 (%)   Oral Monitor Lying Right arm --       Physical Exam  INITIAL VITAL SIGNS: Reviewed by me.  Pulse ox normal  GENERAL: Alert and interactive. No acute distress.  HEAD: Head is normocephalic.  EYES: EOMI. PERRL. No scleral icterus. No conjunctival injection.  ENT: Moist mucous membranes.   NECK: Supple. Full range of motion.  RESPIRATORY: No tachypnea. Clear breath sounds bilaterally. No wheezing. No rales. No rhonchi.  CV: Regular rate and rhythm. No murmurs. No rubs or gallops.  ABDOMEN: Soft, non-distended, non-tender. No guarding. No rebound. No masses.   BACK:  No obvious deformity.  EXTREMITIES: No deformity. No clubbing or cyanosis. No edema.   SKIN: Warm and dry. No diaphoresis. No obvious rashes.   NEUROLOGIC: Alert and oriented. Face is symmetric. Speech is normal. Moves all  extremities equally. Motor and sensory distally intact.       LAB RESULTS  Results for orders placed or performed during the hospital encounter of 01/07/21   Comprehensive Metabolic Panel    Specimen: Blood   Result Value Ref Range    Glucose 95 65 - 99 mg/dL    BUN 16 8 - 23 mg/dL    Creatinine 0.80 0.57 - 1.00 mg/dL    Sodium 135 (L) 136 - 145 mmol/L    Potassium 4.2 3.5 - 5.2 mmol/L    Chloride 99 98 - 107 mmol/L    CO2 25.9 22.0 - 29.0 mmol/L    Calcium 10.0 8.6 - 10.5 mg/dL    Total Protein 8.0 6.0 - 8.5 g/dL    Albumin 4.40 3.50 - 5.20 g/dL    ALT (SGPT) 21 1 - 33 U/L    AST (SGOT) 19 1 - 32 U/L    Alkaline Phosphatase 96 39 - 117 U/L    Total Bilirubin 0.6 0.0 - 1.2 mg/dL    eGFR Non African Amer 72 >60 mL/min/1.73    Globulin 3.6 gm/dL    A/G Ratio 1.2 g/dL    BUN/Creatinine Ratio 20.0 7.0 - 25.0    Anion Gap 10.1 5.0 - 15.0 mmol/L   Protime-INR    Specimen: Blood   Result Value Ref Range    Protime 13.9 12.1 - 15.0 Seconds    INR 1.10 0.90 - 1.10   Urinalysis With Microscopic If Indicated (No Culture) - Urine, Clean Catch    Specimen: Urine, Clean Catch   Result Value Ref Range    Color, UA Yellow Yellow, Straw    Appearance, UA Clear Clear    pH, UA 7.0 4.5 - 8.0    Specific Gravity, UA 1.015 1.003 - 1.030    Glucose, UA Negative Negative    Ketones, UA Negative Negative    Bilirubin, UA Negative Negative    Blood, UA Negative Negative    Protein, UA Negative Negative    Leuk Esterase, UA Negative Negative    Nitrite, UA Negative Negative    Urobilinogen, UA 0.2 E.U./dL 0.2 - 1.0 E.U./dL   BNP    Specimen: Blood   Result Value Ref Range    proBNP 20.3 0.0 - 900.0 pg/mL   Troponin    Specimen: Blood   Result Value Ref Range    Troponin T <0.010 0.000 - 0.030 ng/mL   CBC Auto Differential    Specimen: Blood   Result Value Ref Range    WBC 8.42 3.40 - 10.80 10*3/mm3    RBC 5.07 3.77 - 5.28 10*6/mm3    Hemoglobin 14.9 12.0 - 15.9 g/dL    Hematocrit 45.1 34.0 - 46.6 %    MCV 89.0 79.0 - 97.0 fL    MCH 29.4 26.6  - 33.0 pg    MCHC 33.0 31.5 - 35.7 g/dL    RDW 12.7 12.3 - 15.4 %    RDW-SD 41.2 37.0 - 54.0 fl    MPV 9.8 6.0 - 12.0 fL    Platelets 277 140 - 450 10*3/mm3    Neutrophil % 58.4 42.7 - 76.0 %    Lymphocyte % 31.5 19.6 - 45.3 %    Monocyte % 7.5 5.0 - 12.0 %    Eosinophil % 1.7 0.3 - 6.2 %    Basophil % 0.7 0.0 - 1.5 %    Immature Grans % 0.2 0.0 - 0.5 %    Neutrophils, Absolute 4.92 1.70 - 7.00 10*3/mm3    Lymphocytes, Absolute 2.65 0.70 - 3.10 10*3/mm3    Monocytes, Absolute 0.63 0.10 - 0.90 10*3/mm3    Eosinophils, Absolute 0.14 0.00 - 0.40 10*3/mm3    Basophils, Absolute 0.06 0.00 - 0.20 10*3/mm3    Immature Grans, Absolute 0.02 0.00 - 0.05 10*3/mm3    nRBC 0.0 0.0 - 0.2 /100 WBC   D-dimer, Quantitative    Specimen: Blood   Result Value Ref Range    D-Dimer, Quantitative 1.12 (H) 0.00 - 0.46 MCGFEU/mL   ECG 12 Lead   Result Value Ref Range    QT Interval 340 ms   ECG 12 Lead   Result Value Ref Range    QT Interval 337 ms         I ordered the above labs and reviewed the results    RADIOLOGY  Nm Lung Scan Perfusion Particulate    Result Date: 1/7/2021  PERFUSION STUDY OF THE LUNGS 01/07/2021    HISTORY: Chest pain shortness of breath for 7 days. Elevated d-dimer.  DOSE: 5.7 mCi of technetium 99m MAA.  COMPARISON: Chest x-ray from today  FINDINGS: The perfusion images are normal      Normal study. No perfusion defect is identified  This report was finalized on 1/7/2021 4:18 PM by Dr. Sudeep Clay MD.      Xr Chest 1 View    Result Date: 1/7/2021  AP PORTABLE CHEST, 01/07/2021  HISTORY: Chest pain shortness of air for one week  COMPARISON: 05/08/2019  TECHNIQUE: AP portable chest x-ray.  FINDINGS: Heart size and pulmonary vascularity are normal. The lungs are expanded and clear. No visible pulmonary infiltrate or pleural effusion.       Negative single view chest  This report was finalized on 1/7/2021 3:54 PM by Dr. Sudeep Clay MD.        I ordered the above radiologic testing and reviewed the  results    PROCEDURES  Procedures  EKG           EKG time/Interp time: 1318/1320  Rhythm/Rate: Sinus rhythm rate of 90  P waves and WV: Normal P waves with a normal WV interval  QRS, axis: Normal QRS duration normal axis  ST and T waves: No ST elevations or depressions    Independently interpreted by me contemporaneously with treatment    EKG           EKG time/Interp time: 1349/1352  Rhythm/Rate: Sinus rhythm rate of 100  P waves and WV: Normal P waves with normal WV interval  QRS, axis: Normal QRS duration with normal axis  ST and T waves: No ST elevations or depressions    Independently interpreted by me contemporaneously with treatment    PROGRESS AND CONSULTS  ED Course as of Jan 07 1717   Thu Jan 07, 2021   1505 Care of this patient has been transferred from Dr. Master Mcdaniel to Dr. Drew Yoder.    [SS]   1645 VQ scan is unremarkable.  I find no etiology for patient's pain.  I am concerned the patient's ongoing pain is cardiac in nature.  Placing nitroglycerin paste on her chest.  Giving Lovenox.  Also giving pain and nausea medicines.  Calling cardiology.  Patient rates her chest pain currently at a 4.  It was at an 8 at its worst.    [SS]   1705 I discussed this case with Dr. Erick Pierre - cardiology.  He feels patient is suitable for  hospitalization here at [facility].  Calling hospitalist.    [SS]   1712 Discussed with Dr. Obed Dukes.  He will hospitalize for further care.    [SS]      ED Course User Index  [SS] Messi Yoder MD     Labs Reviewed   COMPREHENSIVE METABOLIC PANEL - Abnormal; Notable for the following components:       Result Value    Sodium 135 (*)     All other components within normal limits    Narrative:     GFR Normal >60  Chronic Kidney Disease <60  Kidney Failure <15     D-DIMER, QUANTITATIVE - Abnormal; Notable for the following components:    D-Dimer, Quantitative 1.12 (*)     All other components within normal limits    Narrative:     Can be elevated in, but is not  diagnostic for deep vein thrombosis (DVT) or pulmonary embolis (PE).  It is also elevated in other medical conditions.  Clinical correlation is required.  The negative cut-off value for the D-Dimer is 0.50 mcg FEU/mL for DVT and PE.     PROTIME-INR - Normal    Narrative:     Therapeutic Ranges for INR: 2.0-3.0 (PT 20-30)                              2.5-3.5 (PT 25-34)   URINALYSIS W/ MICROSCOPIC IF INDICATED (NO CULTURE) - Normal    Narrative:     Urine microscopic not indicated.   BNP (IN-HOUSE) - Normal    Narrative:     Among patients with dyspnea, NT-proBNP is highly sensitive for the detection of acute congestive heart failure. In addition NT-proBNP of <300 pg/ml effectively rules out acute congestive heart failure with 99% negative predictive value.    Results may be falsely decreased if patient taking Biotin.     TROPONIN (IN-HOUSE) - Normal    Narrative:     Troponin T Reference Range:  <= 0.03 ng/mL-   Negative for AMI  >0.03 ng/mL-     Abnormal for myocardial necrosis.  Clinicians would have to utilize clinical acumen, EKG, Troponin and serial changes to determine if it is an Acute Myocardial Infarction or myocardial injury due to an underlying chronic condition.       Results may be falsely decreased if patient taking Biotin.     CBC WITH AUTO DIFFERENTIAL - Normal   CBC AND DIFFERENTIAL    Narrative:     The following orders were created for panel order CBC & Differential.  Procedure                               Abnormality         Status                     ---------                               -----------         ------                     CBC Auto Differential[[id number]]        Normal              Final result                 Please view results for these tests on the individual orders.     Nm Lung Scan Perfusion Particulate    Result Date: 1/7/2021  Narrative: PERFUSION STUDY OF THE LUNGS 01/07/2021    HISTORY: Chest pain shortness of breath for 7 days. Elevated d-dimer.  DOSE: 5.7 mCi of  technetium 99m MAA.  COMPARISON: Chest x-ray from today  FINDINGS: The perfusion images are normal      Impression: Normal study. No perfusion defect is identified  This report was finalized on 1/7/2021 4:18 PM by Dr. Sudeep Clay MD.      Xr Chest 1 View    Result Date: 1/7/2021  Narrative: AP PORTABLE CHEST, 01/07/2021  HISTORY: Chest pain shortness of air for one week  COMPARISON: 05/08/2019  TECHNIQUE: AP portable chest x-ray.  FINDINGS: Heart size and pulmonary vascularity are normal. The lungs are expanded and clear. No visible pulmonary infiltrate or pleural effusion.       Impression: Negative single view chest  This report was finalized on 1/7/2021 3:54 PM by Dr. Sudeep Clay MD.      My differential diagnosis for chest pain includes but is not limited to:  Muscle strain, costochondritis, myositis, pleurisy, rib fracture, intercostal neuritis, herpes zoster, tumor, myocardial infarction, coronary syndrome, unstable angina, angina, aortic dissection, mitral valve prolapse, pericarditis, palpitations, pulmonary embolus, pneumonia, pneumothorax, lung cancer, GERD, esophagitis, esophageal spasm        MEDICAL DECISION MAKING      MDM      HEART Score (for prediction of 6-week risk of major adverse cardiac event) reviewed and/or performed as part of the patient evaluation and treatment planning process.  The result associated with this review/performance is: 4      65-year-old female complaining of some intermittent chest pain since Saturday.  When it first started she had some nausea and dyspnea but no diaphoresis.  She says sometimes is pressure-like sometimes is burning-like.  She has had a cardiac work-up but not for the last 19 months.  I plan to draw labs including a D-dimer as she is complaining of dyspnea and then she likely will need admission based on age and complaint.    D-dimer is positive, I have ordered a CTA    Multiple attempts to get additional line for the CTA have been undertaken by nursing staff  included ultrasound-guided placement of IV which appeared successful at first but the line blew with flushing.  I have therefore ordered a VQ scan.  Labs show negative troponin normal CBC, normal CMP, normal BNP.  Care turned over to oncoming physician Dr. Yoder pending completion and results of VQ scan and then patient should be admitted to the hospital.        DIAGNOSIS  Final diagnoses:   Chest pain, unspecified type       Latest Documented Vital Signs:  As of 17:17 EST  BP- 151/75 HR- 88 Temp- 98 °F (36.7 °C) (Oral) O2 sat- 98%    DISPOSITION  Observation      Discussed pertinent labs and imaging findings with the patient/family.  Patient/Family voiced understanding of need to follow-up for recheck, further testing as needed.  Return to the emergency Department warnings were given.         Medication List      No changes were made to your prescriptions during this visit.                   Dictated utilizing Dragon dictation     Messi Yoder MD  01/07/21 6417

## 2021-01-07 NOTE — ED NOTES
Call placed to Dr. Dukes, connected with Dr. Yoder. Call complete.      Ander Dotson  01/07/21 0052

## 2021-01-08 ENCOUNTER — TRANSCRIBE ORDERS (OUTPATIENT)
Dept: ADMINISTRATIVE | Facility: HOSPITAL | Age: 66
End: 2021-01-08

## 2021-01-08 ENCOUNTER — LAB (OUTPATIENT)
Dept: LAB | Facility: HOSPITAL | Age: 66
End: 2021-01-08

## 2021-01-08 VITALS
BODY MASS INDEX: 25.73 KG/M2 | RESPIRATION RATE: 18 BRPM | HEIGHT: 64 IN | HEART RATE: 84 BPM | WEIGHT: 150.7 LBS | TEMPERATURE: 97.5 F | DIASTOLIC BLOOD PRESSURE: 76 MMHG | OXYGEN SATURATION: 97 % | SYSTOLIC BLOOD PRESSURE: 115 MMHG

## 2021-01-08 DIAGNOSIS — R07.9 CHEST PAIN, UNSPECIFIED TYPE: Primary | ICD-10-CM

## 2021-01-08 DIAGNOSIS — Z01.818 OTHER SPECIFIED PRE-OPERATIVE EXAMINATION: Primary | ICD-10-CM

## 2021-01-08 DIAGNOSIS — Z01.818 OTHER SPECIFIED PRE-OPERATIVE EXAMINATION: ICD-10-CM

## 2021-01-08 LAB
ALBUMIN SERPL-MCNC: 4.3 G/DL (ref 3.5–5.2)
ALBUMIN/GLOB SERPL: 1.3 G/DL
ALP SERPL-CCNC: 89 U/L (ref 39–117)
ALT SERPL W P-5'-P-CCNC: 18 U/L (ref 1–33)
ANION GAP SERPL CALCULATED.3IONS-SCNC: 6.3 MMOL/L (ref 5–15)
AST SERPL-CCNC: 17 U/L (ref 1–32)
BH CV ECHO MEAS - AO MAX PG: 6 MMHG
BH CV ECHO MEAS - AO MEAN PG (FULL): 1 MMHG
BH CV ECHO MEAS - AO MEAN PG: 3 MMHG
BH CV ECHO MEAS - AO ROOT AREA (BSA CORRECTED): 1.8
BH CV ECHO MEAS - AO ROOT AREA: 8 CM^2
BH CV ECHO MEAS - AO ROOT DIAM: 3.2 CM
BH CV ECHO MEAS - AO V2 MAX: 121 CM/SEC
BH CV ECHO MEAS - AO V2 MEAN: 83.8 CM/SEC
BH CV ECHO MEAS - AO V2 VTI: 22 CM
BH CV ECHO MEAS - ASC AORTA: 3 CM
BH CV ECHO MEAS - AVA(I,A): 2.4 CM^2
BH CV ECHO MEAS - AVA(I,D): 2.4 CM^2
BH CV ECHO MEAS - BSA(HAYCOCK): 1.8 M^2
BH CV ECHO MEAS - BSA: 1.7 M^2
BH CV ECHO MEAS - BZI_BMI: 25.7 KILOGRAMS/M^2
BH CV ECHO MEAS - BZI_METRIC_HEIGHT: 162.6 CM
BH CV ECHO MEAS - BZI_METRIC_WEIGHT: 68 KG
BH CV ECHO MEAS - EDV(CUBED): 50.7 ML
BH CV ECHO MEAS - EDV(MOD-SP2): 61.3 ML
BH CV ECHO MEAS - EDV(MOD-SP4): 70.9 ML
BH CV ECHO MEAS - EDV(TEICH): 58.1 ML
BH CV ECHO MEAS - EF(CUBED): 67.3 %
BH CV ECHO MEAS - EF(MOD-BP): 52.8 %
BH CV ECHO MEAS - EF(MOD-SP2): 52.9 %
BH CV ECHO MEAS - EF(MOD-SP4): 53 %
BH CV ECHO MEAS - EF(TEICH): 59.7 %
BH CV ECHO MEAS - ESV(CUBED): 16.6 ML
BH CV ECHO MEAS - ESV(MOD-SP2): 28.9 ML
BH CV ECHO MEAS - ESV(MOD-SP4): 33.3 ML
BH CV ECHO MEAS - ESV(TEICH): 23.4 ML
BH CV ECHO MEAS - FS: 31.1 %
BH CV ECHO MEAS - IVS/LVPW: 0.94
BH CV ECHO MEAS - IVSD: 0.91 CM
BH CV ECHO MEAS - LAT PEAK E' VEL: 8.3 CM/SEC
BH CV ECHO MEAS - LV DIASTOLIC VOL/BSA (35-75): 41 ML/M^2
BH CV ECHO MEAS - LV MASS(C)D: 103.1 GRAMS
BH CV ECHO MEAS - LV MASS(C)DI: 59.6 GRAMS/M^2
BH CV ECHO MEAS - LV MAX PG: 3 MMHG
BH CV ECHO MEAS - LV MEAN PG: 2 MMHG
BH CV ECHO MEAS - LV SYSTOLIC VOL/BSA (12-30): 19.2 ML/M^2
BH CV ECHO MEAS - LV V1 MAX: 88 CM/SEC
BH CV ECHO MEAS - LV V1 MEAN: 64.3 CM/SEC
BH CV ECHO MEAS - LV V1 VTI: 16.6 CM
BH CV ECHO MEAS - LVIDD: 3.7 CM
BH CV ECHO MEAS - LVIDS: 2.6 CM
BH CV ECHO MEAS - LVLD AP2: 7 CM
BH CV ECHO MEAS - LVLD AP4: 7.1 CM
BH CV ECHO MEAS - LVLS AP2: 6.1 CM
BH CV ECHO MEAS - LVLS AP4: 6 CM
BH CV ECHO MEAS - LVOT AREA (M): 3.1 CM^2
BH CV ECHO MEAS - LVOT AREA: 3.1 CM^2
BH CV ECHO MEAS - LVOT DIAM: 2 CM
BH CV ECHO MEAS - LVPWD: 0.97 CM
BH CV ECHO MEAS - MED PEAK E' VEL: 6.3 CM/SEC
BH CV ECHO MEAS - MV A MAX VEL: 70.1 CM/SEC
BH CV ECHO MEAS - MV DEC SLOPE: 257 CM/SEC^2
BH CV ECHO MEAS - MV DEC TIME: 219 SEC
BH CV ECHO MEAS - MV E MAX VEL: 56.3 CM/SEC
BH CV ECHO MEAS - MV E/A: 0.8
BH CV ECHO MEAS - MV MEAN PG: 2 MMHG
BH CV ECHO MEAS - MV P1/2T MAX VEL: 70 CM/SEC
BH CV ECHO MEAS - MV P1/2T: 79.8 MSEC
BH CV ECHO MEAS - MV V2 MEAN: 62 CM/SEC
BH CV ECHO MEAS - MV V2 VTI: 20.9 CM
BH CV ECHO MEAS - MVA P1/2T LCG: 3.1 CM^2
BH CV ECHO MEAS - MVA(P1/2T): 2.8 CM^2
BH CV ECHO MEAS - MVA(VTI): 2.5 CM^2
BH CV ECHO MEAS - RAP SYSTOLE: 3 MMHG
BH CV ECHO MEAS - SI(AO): 102.2 ML/M^2
BH CV ECHO MEAS - SI(CUBED): 19.7 ML/M^2
BH CV ECHO MEAS - SI(LVOT): 30.1 ML/M^2
BH CV ECHO MEAS - SI(MOD-SP2): 18.7 ML/M^2
BH CV ECHO MEAS - SI(MOD-SP4): 21.7 ML/M^2
BH CV ECHO MEAS - SI(TEICH): 20 ML/M^2
BH CV ECHO MEAS - SV(AO): 176.9 ML
BH CV ECHO MEAS - SV(CUBED): 34.1 ML
BH CV ECHO MEAS - SV(LVOT): 52.2 ML
BH CV ECHO MEAS - SV(MOD-SP2): 32.4 ML
BH CV ECHO MEAS - SV(MOD-SP4): 37.6 ML
BH CV ECHO MEAS - SV(TEICH): 34.7 ML
BH CV ECHO MEAS - TAPSE (>1.6): 1.7 CM
BH CV ECHO MEASUREMENTS AVERAGE E/E' RATIO: 7.71
BH CV XLRA - RV BASE: 2.3 CM
BH CV XLRA - TDI S': 9.4 CM/SEC
BILIRUB SERPL-MCNC: 0.7 MG/DL (ref 0–1.2)
BUN SERPL-MCNC: 15 MG/DL (ref 8–23)
BUN/CREAT SERPL: 20 (ref 7–25)
CALCIUM SPEC-SCNC: 9.3 MG/DL (ref 8.6–10.5)
CHLORIDE SERPL-SCNC: 103 MMOL/L (ref 98–107)
CO2 SERPL-SCNC: 27.7 MMOL/L (ref 22–29)
CREAT SERPL-MCNC: 0.75 MG/DL (ref 0.57–1)
DEPRECATED RDW RBC AUTO: 43.8 FL (ref 37–54)
ERYTHROCYTE [DISTWIDTH] IN BLOOD BY AUTOMATED COUNT: 12.9 % (ref 12.3–15.4)
GFR SERPL CREATININE-BSD FRML MDRD: 77 ML/MIN/1.73
GLOBULIN UR ELPH-MCNC: 3.4 GM/DL
GLUCOSE SERPL-MCNC: 88 MG/DL (ref 65–99)
HCT VFR BLD AUTO: 43.4 % (ref 34–46.6)
HGB BLD-MCNC: 14.2 G/DL (ref 12–15.9)
LEFT ATRIUM VOLUME INDEX: 15 ML/M2
MAXIMAL PREDICTED HEART RATE: 153 BPM
MCH RBC QN AUTO: 30 PG (ref 26.6–33)
MCHC RBC AUTO-ENTMCNC: 32.7 G/DL (ref 31.5–35.7)
MCV RBC AUTO: 91.8 FL (ref 79–97)
PLATELET # BLD AUTO: 247 10*3/MM3 (ref 140–450)
PMV BLD AUTO: 10.8 FL (ref 6–12)
POTASSIUM SERPL-SCNC: 4.5 MMOL/L (ref 3.5–5.2)
PROT SERPL-MCNC: 7.7 G/DL (ref 6–8.5)
QT INTERVAL: 337 MS
QT INTERVAL: 340 MS
RBC # BLD AUTO: 4.73 10*6/MM3 (ref 3.77–5.28)
SARS-COV-2 ORF1AB RESP QL NAA+PROBE: NOT DETECTED
SODIUM SERPL-SCNC: 137 MMOL/L (ref 136–145)
STRESS TARGET HR: 130 BPM
WBC # BLD AUTO: 6.16 10*3/MM3 (ref 3.4–10.8)

## 2021-01-08 PROCEDURE — 99213 OFFICE O/P EST LOW 20 MIN: CPT | Performed by: INTERNAL MEDICINE

## 2021-01-08 PROCEDURE — U0004 COV-19 TEST NON-CDC HGH THRU: HCPCS

## 2021-01-08 PROCEDURE — G0378 HOSPITAL OBSERVATION PER HR: HCPCS

## 2021-01-08 PROCEDURE — 99217 PR OBSERVATION CARE DISCHARGE MANAGEMENT: CPT | Performed by: HOSPITALIST

## 2021-01-08 PROCEDURE — 80053 COMPREHEN METABOLIC PANEL: CPT | Performed by: INTERNAL MEDICINE

## 2021-01-08 PROCEDURE — C9803 HOPD COVID-19 SPEC COLLECT: HCPCS

## 2021-01-08 PROCEDURE — 85027 COMPLETE CBC AUTOMATED: CPT | Performed by: INTERNAL MEDICINE

## 2021-01-08 RX ORDER — ATORVASTATIN CALCIUM 10 MG/1
10 TABLET, FILM COATED ORAL DAILY
Status: DISCONTINUED | OUTPATIENT
Start: 2021-01-09 | End: 2021-01-08

## 2021-01-08 RX ORDER — ATORVASTATIN CALCIUM 40 MG/1
40 TABLET, FILM COATED ORAL NIGHTLY
Status: DISCONTINUED | OUTPATIENT
Start: 2021-01-08 | End: 2021-01-08 | Stop reason: HOSPADM

## 2021-01-08 RX ORDER — ATORVASTATIN CALCIUM 40 MG/1
40 TABLET, FILM COATED ORAL NIGHTLY
Qty: 30 TABLET | Refills: 0 | Status: SHIPPED | OUTPATIENT
Start: 2021-01-08 | End: 2021-02-07

## 2021-01-08 RX ADMIN — MULTIPLE VITAMINS W/ MINERALS TAB 1 TABLET: TAB at 08:27

## 2021-01-08 RX ADMIN — METOPROLOL TARTRATE 25 MG: 25 TABLET, FILM COATED ORAL at 11:24

## 2021-01-08 RX ADMIN — MONTELUKAST SODIUM 10 MG: 10 TABLET, FILM COATED ORAL at 08:27

## 2021-01-08 RX ADMIN — ACETAMINOPHEN 650 MG: 325 TABLET, FILM COATED ORAL at 11:25

## 2021-01-08 RX ADMIN — SODIUM CHLORIDE, PRESERVATIVE FREE 10 ML: 5 INJECTION INTRAVENOUS at 08:29

## 2021-01-08 RX ADMIN — ASPIRIN 81 MG: 81 TABLET, COATED ORAL at 08:27

## 2021-01-08 RX ADMIN — LISINOPRIL 10 MG: 10 TABLET ORAL at 08:27

## 2021-01-08 NOTE — PLAN OF CARE
Goal Outcome Evaluation:  Plan of Care Reviewed With: patient  Progress: improving  Outcome Summary: no c/o chest pain this shift. plan to see [facility] this morning and have ECHO. continue to monitor with VS and telemetry.

## 2021-01-08 NOTE — CONSULTS
Date of Hospital Visit: 2021  Date of consult: 2020  Encounter Provider: Keyur Mancini MD  Place of Service: [facility] CARDIOLOGY  Patient Name: Christiane Smith  :1955  Referral Provider: No ref. provider found    Chief complaint-chest pain    For consult: Chest pain    History of Present Illness  Ms. Smith is a 65 years old female patient with past medical history of hyperlipidemia and hypertension came to the ER yesterday with progressively worsening intermittent chest pain of 1 week duration.  Episodes  last for few minutes and of variable severity and located to the retrosternal area that she describes as heaviness.  No known exacerbating or relieving factor and she gets chest pain past during exertion and at rest and even during nighttime.  Associated she has shortness of breath.  She reports since after recovering from Covid back in 2020 she has had persistent fatigue, shortness of breath and cough.  She denied any palpitations, presyncope or syncope, orthopnea, PND or extremity swelling.  She admits she does not exercise regularly.  She denied any prior tobacco use.    She had echocardiogram and treadmill test back in .  She walked only for 3 minutes EKG was negative for ischemia the duration of the study.  Echocardiogram was unremarkable.    She reports her blood pressures well controlled on lisinopril 10 mg daily.    She has negative troponin x2 and EKG showing only mild sinus tachycardia.  And she was given sublingual nitroglycerin x2 and aspirin and on Nitropaste temporarily and pain completely resolved.  No recurrence of pain since after admission.    So also had VQ scan because of borderline elevated D-dimer that is negative for any significant PE.      Past Medical History:   Diagnosis Date   • Arthritis    • GERD (gastroesophageal reflux disease)    • Hyperlipidemia    • Hypertension    • Migraine    • Plantar fasciitis        Past  Surgical History:   Procedure Laterality Date   • CHOLECYSTECTOMY         Medications Prior to Admission   Medication Sig Dispense Refill Last Dose   • aspirin 81 MG EC tablet Take 1 tablet by mouth Daily. 30 tablet 0 1/7/2021 at am   • atorvastatin (LIPITOR) 10 MG tablet 10 mg Daily.   1/7/2021 at am   • B Complex-C-E-Zn (b complex-C-E-zinc) tablet Take 1 tablet by mouth Daily.   1/7/2021 at am   • Calcium-Magnesium-Vitamin D ER (CALCIUM 1200+D3) 600- MG-MG-UNIT tablet sustained-release 24 hour Take 1 tablet by mouth Daily.   1/7/2021 at am   • celecoxib (CeleBREX) 200 MG capsule Take 200 mg by mouth 2 (Two) Times a Day.   1/7/2021 at am   • estradiol (ESTRACE) 0.5 MG tablet TAKE ONE TABLET BY MOUTH DAILY (Patient taking differently: Take 0.5 mg by mouth Every Other Day.) 90 tablet 2 1/7/2021 at Unknown time   • lisinopril (PRINIVIL,ZESTRIL) 10 MG tablet 10 mg Daily.   1/7/2021 at am   • medroxyPROGESTERone (PROVERA) 2.5 MG tablet TAKE 1 TABLET BY MOUTH ONCE A DAY (Patient taking differently: Take 2.5 mg by mouth 2 (two) times a day.) 90 tablet 1 1/7/2021 at am   • montelukast (SINGULAIR) 10 MG tablet Take 10 mg by mouth Daily.   1/7/2021 at am   • Multiple Vitamins-Minerals (MULTIVITAMIN WITH MINERALS) tablet tablet Take 1 tablet by mouth Daily.   1/7/2021 at am   • omeprazole (PriLOSEC) 40 MG capsule 40 mg Daily.   1/7/2021 at am   • Acetaminophen 325 MG capsule Take 1-2 capsules by mouth 2 (Two) Times a Day As Needed.   Unknown at Unknown time   • SUMAtriptan (IMITREX) 50 MG tablet Take 50 mg by mouth Every 2 (Two) Hours As Needed for migraine. Take one tablet at onset of headache. May repeat dose one time in 2 hours if headache not relieved.   Unknown at Unknown time       Current Meds  Scheduled Meds:aspirin, 81 mg, Oral, Daily  [START ON 1/9/2021] atorvastatin, 10 mg, Oral, Daily  lisinopril, 10 mg, Oral, Daily  montelukast, 10 mg, Oral, Daily  multivitamin with minerals, 1 tablet, Oral,  "Daily  pantoprazole, 40 mg, Oral, QAM  sodium chloride, 10 mL, Intravenous, Q12H      Continuous Infusions:   PRN Meds:.•  acetaminophen **OR** acetaminophen **OR** acetaminophen  •  aluminum-magnesium hydroxide-simethicone  •  bisacodyl  •  bisacodyl  •  magnesium hydroxide  •  nitroglycerin  •  ondansetron **OR** ondansetron  •  [COMPLETED] Insert peripheral IV **AND** sodium chloride  •  sodium chloride  •  sodium chloride  •  SUMAtriptan    Allergies as of 01/07/2021   • (No Known Allergies)       Social History     Socioeconomic History   • Marital status:      Spouse name: Not on file   • Number of children: Not on file   • Years of education: Not on file   • Highest education level: Not on file   Tobacco Use   • Smoking status: Never Smoker   • Smokeless tobacco: Never Used   Substance and Sexual Activity   • Alcohol use: No   • Drug use: No   • Sexual activity: Defer       Family History   Problem Relation Age of Onset   • Breast cancer Maternal Grandmother        REVIEW OF SYSTEMS:   All systems reviewed and negative except as noted in HPI.       Objective:   Temp:  [97.4 °F (36.3 °C)-98.4 °F (36.9 °C)] 97.4 °F (36.3 °C)  Heart Rate:  [77-93] 83  Resp:  [16-18] 18  BP: (104-151)/(61-93) 144/82  Body mass index is 25.85 kg/m².  Flowsheet Rows      First Filed Value   Admission Height  162.6 cm (64\") Documented at 01/07/2021 1313   Admission Weight  68.4 kg (150 lb 11.2 oz) Documented at 01/07/2021 1313        Vitals:    01/08/21 0630   BP: 144/82   Pulse: 83   Resp: 18   Temp: 97.4 °F (36.3 °C)   SpO2: 98%       General Appearance:    Alert, cooperative, in no acute distress   Head:    Normocephalic, without obvious abnormality, atraumatic   Eyes:            Lids and lashes normal, conjunctivae and sclerae normal, no   icterus, no pallor, corneas clear, PERRLA   Ears:    Ears appear intact with no abnormalities noted   Throat:   No oral lesions, no thrush, oral mucosa moist   Neck:   No adenopathy, " supple, trachea midline, no thyromegaly, no   carotid bruit, no JVD   Back:     No kyphosis present, no scoliosis present, no skin lesions, erythema or scars, no tenderness to percussion or palpation, range of motion normal   Lungs:     Clear to auscultation,respirations regular, even and unlabored    Heart:    Regular rhythm and normal rate, normal S1 and S2, no murmur, no gallop, no rub, no click   Chest Wall:    No abnormalities observed   Abdomen:     Normal bowel sounds, no masses, no organomegaly, soft nontender, nondistended, no guarding, no rebound  tenderness   Extremities:   Moves all extremities well, no edema, no cyanosis, no redness   Pulses:   Pulses palpable and equal bilaterally. Normal radial, carotid, femoral, dorsalis pedis and posterior tibial pulses bilaterally. Normal abdominal aorta   Skin:  Neurology:   Psychiatric:   No bleeding, bruising or rash   Normal speech and cranial nerve exam, no focal deficit   Alert and oriented x 3, normal mood and affect                 Review of Data:      Results from last 7 days   Lab Units 01/08/21  0647   SODIUM mmol/L 137   POTASSIUM mmol/L 4.5   CHLORIDE mmol/L 103   CO2 mmol/L 27.7   BUN mg/dL 15   CREATININE mg/dL 0.75   CALCIUM mg/dL 9.3   BILIRUBIN mg/dL 0.7   ALK PHOS U/L 89   ALT (SGPT) U/L 18   AST (SGOT) U/L 17   GLUCOSE mg/dL 88     Results from last 7 days   Lab Units 01/07/21  1932 01/07/21  1328   TROPONIN T ng/mL <0.010 <0.010     @LABRCNTbnp@  Results from last 7 days   Lab Units 01/08/21  0647 01/07/21  1328   WBC 10*3/mm3 6.16 8.42   HEMOGLOBIN g/dL 14.2 14.9   HEMATOCRIT % 43.4 45.1   PLATELETS 10*3/mm3 247 277     Results from last 7 days   Lab Units 01/07/21  1328   INR  1.10         @[facility](chol,trig,hdl,ldl)    I personally viewed and interpreted the patient's EKG/Telemetry data  )  Patient Active Problem List   Diagnosis   • Gastroenteritis due to norovirus   • Hypertension   • GERD (gastroesophageal reflux disease)   • Chest pain      Assessment and Plan:    1.  Chest pain concerning for angina: Troponin negative x2, EKG with sinus tachycardia, normal proBNP  -Patient is hemodynamically stable.  She cannot get myocardial perfusion study because of VQ scan she had yesterday  - continue aspirin 81 mg daily, start metoprolol 25 mg daily and increase Lipitor to 40 mg daily.  Discontinue lisinopril as her blood pressures is normal and to avoid hypotension with addition of metoprolol.    2.  Hypertension-controlled    3.  Hyperlipidemia-on statin    4.  Recent history of COVID-19 infection.    Patient can be discharged home from cardiology standpoint instead of waiting till Monday for stress test.  Schedule outpatient myocardial perfusion study.  Patient advised to come to the ER with worsening and recurrent chest pain.  She can be released home on as needed nitroglycerin as well.      Keyur Mancini MD  01/08/21  09:21 EST.  Time spent in reviewing chart, discussion and examination:

## 2021-01-08 NOTE — NURSING NOTE
Case Management Discharge Note      Final Note: D/C home         Selected Continued Care - Discharged on 1/8/2021 Admission date: 1/7/2021 - Discharge disposition: Home or Self Care    Destination    No services have been selected for the patient.              Durable Medical Equipment    No services have been selected for the patient.              Dialysis/Infusion    No services have been selected for the patient.              Home Medical Care    No services have been selected for the patient.              Therapy    No services have been selected for the patient.              Community Resources    No services have been selected for the patient.                       Final Discharge Disposition Code: 01 - home or self-care

## 2021-01-08 NOTE — NURSING NOTE
Discharge Planning Assessment   Yaima Eaton     Patient Name: Christiane Smith  MRN: [id number]  Today's Date: 1/8/2021    Admit Date: 1/7/2021    Discharge Needs Assessment     Row Name 01/08/21 1117       Living Environment    Lives With  spouse;grandchild(hiro)    Name(s) of Who Lives With Patient   and 20 year old grandson    Current Living Arrangements  home/apartment/condo    Primary Care Provided by  self    Provides Primary Care For  no one        Discharge Plan     Row Name 01/08/21 1156       Plan    Plan Comments  Spoke with Mrs Smith at bedside.  She her  and 20 year old grandson live in a home in [place].  She does not have any DME or oxygen at home.  She is independent with her ADL's and drives herself.  Her pharmacy is [facility] in [place].  She does not have an advanced directive on file and has no interest in such.  Plan is home when stable.  Will continue to follow    Row Name 01/08/21 1159       Plan    Plan  Home when stable        Continued Care and Services - Admitted Since 1/7/2021    Coordination has not been started for this encounter.       Expected Discharge Date and Time     Expected Discharge Date Expected Discharge Time    Jan 8, 2021         Demographic Summary     Row Name 01/08/21 1117       General Information    Admission Type  observation    Arrived From  home    Referral Source  admission list    Reason for Consult  discharge planning    Preferred Language  English     Used During This Interaction  no       Contact Information    Permission Granted to Share Info With          Functional Status    No documentation.       Psychosocial    No documentation.       Abuse/Neglect    No documentation.       Legal    No documentation.       Substance Abuse    No documentation.       Patient Forms    No documentation.           Mackenzie Chairez RN

## 2021-01-08 NOTE — DISCHARGE SUMMARY
Christiane Smith  1955  [id number]    Hospitalists Discharge Summary    Date of Admission: 1/7/2021  Date of Discharge:  1/8/2021    Primary Discharge Diagnoses:  1.  Chest Pain    Secondary Discharge Diagnoses:  1.  HTN  2.  GERD    History of Present Illness (taken from H&P):  Pleasant 65-year-old female presented to the ER for a history of chest pain that started on Saturday.  Ever since Saturday she has had intermittent substernal chest pressure associated with some shortness of air with the pressure.  She is not had any real dyspnea on exertion.  There is also some associated nausea.  The pain was becoming more frequent and was unrelieved with therapies at home.  She tried to make appointment with her primary but they had no appointments so she came to the ER for evaluation.  In the ER D-dimer was elevated they attempted CTA but had trouble with access and had a VQ scan that was negative.  ER discussed with cardiology and decision was made for admission here.  Of note she had a negative echocardiogram and stress test in May 2019.  She also had Covid along with her  in November but she has recovered well from this with no symptoms.    Hospital Course:  Ms. Smith was admitted to the Med/Surg unit.  AMI was excluded by serial biomarker study.  She was seen in consultation by Cardiology, and plans were made for outpatient cardiac stress.    PCP  Patient Care Team:  Samantha Song PA as PCP - General (Physician Assistant)    Consults:   Consults     Date and Time Order Name Status Description    1/7/2021 1806 Inpatient Cardiology Consult            Operations and Procedures Performed:       Nm Lung Scan Perfusion Particulate    Result Date: 1/7/2021  Narrative: PERFUSION STUDY OF THE LUNGS 01/07/2021    HISTORY: Chest pain shortness of breath for 7 days. Elevated d-dimer.  DOSE: 5.7 mCi of technetium 99m MAA.  COMPARISON: Chest x-ray from today  FINDINGS: The perfusion images are normal       Impression: Normal study. No perfusion defect is identified  This report was finalized on 1/7/2021 4:18 PM by Dr. Sudeep Clay MD.      Xr Chest 1 View    Result Date: 1/7/2021  Narrative: AP PORTABLE CHEST, 01/07/2021  HISTORY: Chest pain shortness of air for one week  COMPARISON: 05/08/2019  TECHNIQUE: AP portable chest x-ray.  FINDINGS: Heart size and pulmonary vascularity are normal. The lungs are expanded and clear. No visible pulmonary infiltrate or pleural effusion.       Impression: Negative single view chest  This report was finalized on 1/7/2021 3:54 PM by Dr. Sudeep Clay MD.        Allergies:  has No Known Allergies.    Stefano  reviewed    Discharge Medications:     Discharge Medications      New Medications      Instructions Start Date   metoprolol tartrate 25 MG tablet  Commonly known as: LOPRESSOR   25 mg, Oral, Every 12 Hours Scheduled         Changes to Medications      Instructions Start Date   atorvastatin 40 MG tablet  Commonly known as: LIPITOR  What changed:   · medication strength  · how much to take  · how to take this  · when to take this   40 mg, Oral, Nightly      estradiol 0.5 MG tablet  Commonly known as: ESTRACE  What changed: when to take this   TAKE ONE TABLET BY MOUTH DAILY      medroxyPROGESTERone 2.5 MG tablet  Commonly known as: PROVERA  What changed: when to take this   TAKE 1 TABLET BY MOUTH ONCE A DAY         Continue These Medications      Instructions Start Date   Acetaminophen 325 MG capsule   1-2 capsules, Oral, 2 Times Daily PRN      aspirin 81 MG EC tablet   81 mg, Oral, Daily      b complex-C-E-zinc tablet   1 tablet, Oral, Daily      Calcium 1200+D3 600- MG-MG-UNIT tablet sustained-release 24 hour  Generic drug: Calcium-Magnesium-Vitamin D ER   1 tablet, Oral, Daily      celecoxib 200 MG capsule  Commonly known as: CeleBREX   200 mg, Oral, 2 Times Daily      lisinopril 10 MG tablet  Commonly known as: PRINIVIL,ZESTRIL   10 mg, Daily      montelukast 10 MG  tablet  Commonly known as: SINGULAIR   10 mg, Oral, Daily      multivitamin with minerals tablet tablet   1 tablet, Oral, Daily      omeprazole 40 MG capsule  Commonly known as: priLOSEC   40 mg, Daily      SUMAtriptan 50 MG tablet  Commonly known as: IMITREX   50 mg, Oral, Every 2 Hours PRN, Take one tablet at onset of headache. May repeat dose one time in 2 hours if headache not relieved.              Last Lab Results:   Lab Results (most recent)     Procedure Component Value Units Date/Time    Comprehensive Metabolic Panel [[id number]] Collected: 01/08/21 0647    Specimen: Blood Updated: 01/08/21 0816     Glucose 88 mg/dL      BUN 15 mg/dL      Creatinine 0.75 mg/dL      Sodium 137 mmol/L      Potassium 4.5 mmol/L      Chloride 103 mmol/L      CO2 27.7 mmol/L      Calcium 9.3 mg/dL      Total Protein 7.7 g/dL      Albumin 4.30 g/dL      ALT (SGPT) 18 U/L      AST (SGOT) 17 U/L      Alkaline Phosphatase 89 U/L      Total Bilirubin 0.7 mg/dL      eGFR Non African Amer 77 mL/min/1.73      Globulin 3.4 gm/dL      A/G Ratio 1.3 g/dL      BUN/Creatinine Ratio 20.0     Anion Gap 6.3 mmol/L     Narrative:      GFR Normal >60  Chronic Kidney Disease <60  Kidney Failure <15      CBC (No Diff) [[id number]]  (Normal) Collected: 01/08/21 0647    Specimen: Blood Updated: 01/08/21 0804     WBC 6.16 10*3/mm3      RBC 4.73 10*6/mm3      Hemoglobin 14.2 g/dL      Hematocrit 43.4 %      MCV 91.8 fL      MCH 30.0 pg      MCHC 32.7 g/dL      RDW 12.9 %      RDW-SD 43.8 fl      MPV 10.8 fL      Platelets 247 10*3/mm3     Troponin [[id number]]  (Normal) Collected: 01/07/21 1932    Specimen: Blood Updated: 01/1955     Troponin T <0.010 ng/mL     Narrative:      Troponin T Reference Range:  <= 0.03 ng/mL-   Negative for AMI  >0.03 ng/mL-     Abnormal for myocardial necrosis.  Clinicians would have to utilize clinical acumen, EKG, Troponin and serial changes to determine if it is an Acute Myocardial Infarction or myocardial injury due  to an underlying chronic condition.       Results may be falsely decreased if patient taking Biotin.      COVID-19,[facility] IN-HOUSE,Nasal Swab No Transport Media 3-4 HR TAT - Swab, Nasal Cavity [[id number]]  (Normal) Collected: 01/07/21 1744    Specimen: Swab from Nasal Cavity Updated: 01/07/21 1826     COVID19 Not Detected    Narrative:      Fact sheet for providers: https://www.fda.gov/media/867841/download     Fact sheet for patients: https://www.fda.gov/media/160950/download    Test performed by PCR.    BNP [[id number]]  (Normal) Collected: 01/07/21 1328    Specimen: Blood Updated: 01/07/21 1415     proBNP 20.3 pg/mL     Narrative:      Among patients with dyspnea, NT-proBNP is highly sensitive for the detection of acute congestive heart failure. In addition NT-proBNP of <300 pg/ml effectively rules out acute congestive heart failure with 99% negative predictive value.    Results may be falsely decreased if patient taking Biotin.      Urinalysis With Microscopic If Indicated (No Culture) - Urine, Clean Catch [[id number]]  (Normal) Collected: 01/07/21 1330    Specimen: Urine, Clean Catch Updated: 01/07/21 1402     Color, UA Yellow     Appearance, UA Clear     pH, UA 7.0     Specific Gravity, UA 1.015     Glucose, UA Negative     Ketones, UA Negative     Bilirubin, UA Negative     Blood, UA Negative     Protein, UA Negative     Leuk Esterase, UA Negative     Nitrite, UA Negative     Urobilinogen, UA 0.2 E.U./dL    Narrative:      Urine microscopic not indicated.    Troponin [[id number]]  (Normal) Collected: 01/07/21 1328    Specimen: Blood Updated: 01/07/21 1356     Troponin T <0.010 ng/mL     Narrative:      Troponin T Reference Range:  <= 0.03 ng/mL-   Negative for AMI  >0.03 ng/mL-     Abnormal for myocardial necrosis.  Clinicians would have to utilize clinical acumen, EKG, Troponin and serial changes to determine if it is an Acute Myocardial Infarction or myocardial injury due to an underlying chronic condition.        Results may be falsely decreased if patient taking Biotin.      Comprehensive Metabolic Panel [[id number]]  (Abnormal) Collected: 01/07/21 1328    Specimen: Blood Updated: 01/07/21 1353     Glucose 95 mg/dL      BUN 16 mg/dL      Creatinine 0.80 mg/dL      Sodium 135 mmol/L      Potassium 4.2 mmol/L      Chloride 99 mmol/L      CO2 25.9 mmol/L      Calcium 10.0 mg/dL      Total Protein 8.0 g/dL      Albumin 4.40 g/dL      ALT (SGPT) 21 U/L      AST (SGOT) 19 U/L      Alkaline Phosphatase 96 U/L      Total Bilirubin 0.6 mg/dL      eGFR Non African Amer 72 mL/min/1.73      Globulin 3.6 gm/dL      A/G Ratio 1.2 g/dL      BUN/Creatinine Ratio 20.0     Anion Gap 10.1 mmol/L     Narrative:      GFR Normal >60  Chronic Kidney Disease <60  Kidney Failure <15      D-dimer, Quantitative [[id number]]  (Abnormal) Collected: 01/07/21 1328    Specimen: Blood Updated: 01/07/21 1347     D-Dimer, Quantitative 1.12 MCGFEU/mL     Narrative:      Can be elevated in, but is not diagnostic for deep vein thrombosis (DVT) or pulmonary embolis (PE).  It is also elevated in other medical conditions.  Clinical correlation is required.  The negative cut-off value for the D-Dimer is 0.50 mcg FEU/mL for DVT and PE.      Protime-INR [[id number]]  (Normal) Collected: 01/07/21 1328    Specimen: Blood Updated: 01/07/21 1346     Protime 13.9 Seconds      INR 1.10    Narrative:      Therapeutic Ranges for INR: 2.0-3.0 (PT 20-30)                              2.5-3.5 (PT 25-34)    CBC & Differential [[id number]]  (Normal) Collected: 01/07/21 1328    Specimen: Blood Updated: 01/07/21 1522    Narrative:      The following orders were created for panel order CBC & Differential.  Procedure                               Abnormality         Status                     ---------                               -----------         ------                     CBC Auto Differential[[id number]]        Normal              Final result                 Please view  results for these tests on the individual orders.    CBC Auto Differential [[id number]]  (Normal) Collected: 01/07/21 1328    Specimen: Blood Updated: 01/07/21 1334     WBC 8.42 10*3/mm3      RBC 5.07 10*6/mm3      Hemoglobin 14.9 g/dL      Hematocrit 45.1 %      MCV 89.0 fL      MCH 29.4 pg      MCHC 33.0 g/dL      RDW 12.7 %      RDW-SD 41.2 fl      MPV 9.8 fL      Platelets 277 10*3/mm3      Neutrophil % 58.4 %      Lymphocyte % 31.5 %      Monocyte % 7.5 %      Eosinophil % 1.7 %      Basophil % 0.7 %      Immature Grans % 0.2 %      Neutrophils, Absolute 4.92 10*3/mm3      Lymphocytes, Absolute 2.65 10*3/mm3      Monocytes, Absolute 0.63 10*3/mm3      Eosinophils, Absolute 0.14 10*3/mm3      Basophils, Absolute 0.06 10*3/mm3      Immature Grans, Absolute 0.02 10*3/mm3      nRBC 0.0 /100 WBC         Imaging Results (Most Recent)     Procedure Component Value Units Date/Time    NM Lung Scan Perfusion Particulate [[id number]] Collected: 01/07/21 1617     Updated: 01/07/21 1620    Narrative:      PERFUSION STUDY OF THE LUNGS 01/07/2021           HISTORY:   Chest pain shortness of breath for 7 days. Elevated d-dimer.     DOSE:   5.7 mCi of technetium 99m MAA.     COMPARISON:   Chest x-ray from today     FINDINGS:   The perfusion images are normal       Impression:      Normal study. No perfusion defect is identified     This report was finalized on 1/7/2021 4:18 PM by Dr. Sudeep Clay MD.       XR Chest 1 View [[id number]] Collected: 01/07/21 1554     Updated: 01/07/21 1556    Narrative:      AP PORTABLE CHEST, 01/07/2021     HISTORY:  Chest pain shortness of air for one week     COMPARISON:  05/08/2019     TECHNIQUE:  AP portable chest x-ray.     FINDINGS:  Heart size and pulmonary vascularity are normal. The lungs are expanded  and clear. No visible pulmonary infiltrate or pleural effusion.           Impression:      Negative single view chest     This report was finalized on 1/7/2021 3:54 PM by Dr. Sudeep Clay MD.              PROCEDURES      Condition on Discharge:  Stable    Physical Exam at Discharge  Vital Signs  Temp:  [97.4 °F (36.3 °C)-98.4 °F (36.9 °C)] 97.5 °F (36.4 °C)  Heart Rate:  [77-93] 84  Resp:  [16-18] 18  BP: (104-151)/(61-93) 115/76    Physical Exam:  Physical Exam   Constitutional: Patient appears well-developed and well-nourished and in no acute distress   Cardiovascular: Regular rate, regular rhythm, S1 normal and S2 normal.  Exam reveals no gallop and no friction rub.  No murmur heard.  Pulmonary/Chest: Lungs are clear to auscultation bilaterally. No respiratory distress. No wheezes. No rhonchi. No rales.   Abdominal: Soft. Bowel sounds are normal. No distension and no mass. There is no tenderness.   Musculoskeletal: Normal Muscle tone  Extremities: No edema. No asymmetry.  Neurological: Cranial nerves II-XII are grossly intact with no focal deficits.    Discharge Disposition  Home    Visiting Nurse:    No     Home PT/OT:  No     Home Safety Evaluation:  No     DME  None    Discharge Diet:      Dietary Orders (From admission, onward)     Start     Ordered    01/08/21 0951  Diet Regular; Cardiac  Diet Effective Now     Question Answer Comment   Diet Texture / Consistency Regular    Common Modifiers Cardiac        01/08/21 0950                Activity at Discharge:  As tolerated    Follow-up Appointments  Stress exam on Monday, January 11th      Test Results Pending at Discharge  None     Remberto Coe MD  01/08/21  11:17 EST

## 2021-01-09 ENCOUNTER — READMISSION MANAGEMENT (OUTPATIENT)
Dept: CALL CENTER | Facility: HOSPITAL | Age: 66
End: 2021-01-09

## 2021-01-09 NOTE — OUTREACH NOTE
Prep Survey      Responses   [facility] facility patient discharged from?  [place]   Is LACE score < 7 ?  Yes   Emergency Room discharge w/ pulse ox?  No   Eligibility  Readm Mgmt   Discharge diagnosis   Chest pain eval ACS   Does the patient have one of the following disease processes/diagnoses(primary or secondary)?  Other   Does the patient have Home health ordered?  No   Is there a DME ordered?  No   Prep survey completed?  Yes          Ivy Boo RN

## 2021-01-11 ENCOUNTER — HOSPITAL ENCOUNTER (OUTPATIENT)
Dept: CARDIOLOGY | Facility: HOSPITAL | Age: 66
Discharge: HOME | End: 2021-01-11

## 2021-01-11 ENCOUNTER — TELEPHONE (OUTPATIENT)
Dept: CARDIOLOGY | Facility: CLINIC | Age: 66
End: 2021-01-11

## 2021-01-11 ENCOUNTER — READMISSION MANAGEMENT (OUTPATIENT)
Dept: CALL CENTER | Facility: HOSPITAL | Age: 66
End: 2021-01-11

## 2021-01-11 ENCOUNTER — HOSPITAL ENCOUNTER (OUTPATIENT)
Dept: NUCLEAR MEDICINE | Facility: HOSPITAL | Age: 66
Discharge: HOME | End: 2021-01-11

## 2021-01-11 DIAGNOSIS — R07.9 CHEST PAIN, UNSPECIFIED TYPE: ICD-10-CM

## 2021-01-11 LAB
BH CV NUCLEAR PRIOR STUDY: 3
BH CV STRESS BP STAGE 1: NORMAL
BH CV STRESS BP STAGE 2: NORMAL
BH CV STRESS DURATION MIN STAGE 1: 3
BH CV STRESS DURATION MIN STAGE 2: 2
BH CV STRESS DURATION SEC STAGE 1: 0
BH CV STRESS DURATION SEC STAGE 2: 18
BH CV STRESS GRADE STAGE 1: 10
BH CV STRESS GRADE STAGE 2: 12
BH CV STRESS HR STAGE 1: 118
BH CV STRESS HR STAGE 2: 138
BH CV STRESS METS STAGE 1: 5
BH CV STRESS METS STAGE 2: 7.5
BH CV STRESS PROTOCOL 1: NORMAL
BH CV STRESS RECOVERY BP: NORMAL MMHG
BH CV STRESS RECOVERY HR: 88 BPM
BH CV STRESS SPEED STAGE 1: 1.7
BH CV STRESS SPEED STAGE 2: 2.5
BH CV STRESS STAGE 1: 1
BH CV STRESS STAGE 2: 2
LV EF NUC BP: 70 %
MAXIMAL PREDICTED HEART RATE: 153 BPM
PERCENT MAX PREDICTED HR: 90.2 %
STRESS BASELINE BP: NORMAL MMHG
STRESS BASELINE HR: 67 BPM
STRESS O2 SAT REST: 100 %
STRESS PERCENT HR: 106 %
STRESS POST ESTIMATED WORKLOAD: 7.1 METS
STRESS POST EXERCISE DUR MIN: 5 MIN
STRESS POST EXERCISE DUR SEC: 19 SEC
STRESS POST O2 SAT PEAK: 100 %
STRESS POST PEAK BP: NORMAL MMHG
STRESS POST PEAK HR: 138 BPM
STRESS TARGET HR: 130 BPM

## 2021-01-11 PROCEDURE — 0 TECHNETIUM SESTAMIBI: Performed by: INTERNAL MEDICINE

## 2021-01-11 PROCEDURE — 93016 CV STRESS TEST SUPVJ ONLY: CPT | Performed by: INTERNAL MEDICINE

## 2021-01-11 PROCEDURE — 93018 CV STRESS TEST I&R ONLY: CPT | Performed by: INTERNAL MEDICINE

## 2021-01-11 PROCEDURE — A9500 TC99M SESTAMIBI: HCPCS | Performed by: INTERNAL MEDICINE

## 2021-01-11 PROCEDURE — 78452 HT MUSCLE IMAGE SPECT MULT: CPT

## 2021-01-11 PROCEDURE — 78452 HT MUSCLE IMAGE SPECT MULT: CPT | Performed by: INTERNAL MEDICINE

## 2021-01-11 PROCEDURE — 93017 CV STRESS TEST TRACING ONLY: CPT

## 2021-01-11 RX ADMIN — TECHNETIUM TC 99M SESTAMIBI 1 DOSE: 1 INJECTION INTRAVENOUS at 08:43

## 2021-01-11 RX ADMIN — TECHNETIUM TC 99M SESTAMIBI 1 DOSE: 1 INJECTION INTRAVENOUS at 07:03

## 2021-01-12 ENCOUNTER — TELEPHONE (OUTPATIENT)
Dept: CARDIOLOGY | Facility: CLINIC | Age: 66
End: 2021-01-12

## 2021-01-12 ENCOUNTER — READMISSION MANAGEMENT (OUTPATIENT)
Dept: CALL CENTER | Facility: HOSPITAL | Age: 66
End: 2021-01-12

## 2021-01-12 NOTE — OUTREACH NOTE
LAG < 7 Survey      Responses   [facility] facility patient discharged from?  [place]   Does the patient have one of the following disease processes/diagnoses(primary or secondary)?  Other   BHLAG <7 Attempt successful?  No   Unsuccessful attempts  Attempt 2          Gerardo Crockett RN

## 2021-01-12 NOTE — OUTREACH NOTE
LAG < 7 Survey      Responses   [facility] facility patient discharged from?  [place]   Does the patient have one of the following disease processes/diagnoses(primary or secondary)?  Other   BHLAG <7 Attempt successful?  No   Unsuccessful attempts  Attempt 1          Elza Leos LPN

## 2021-01-12 NOTE — PROGRESS NOTES
Please notify patient that she has a normal stress testing.  I would encourage her to continue taking her current medications and also increase level of activity and exercise regularly.    I will see her back in 3 months but she needs to call with any recurrent or persistent chest pain.  Please schedule III month follow-up appointment.    Thank you

## 2021-01-13 ENCOUNTER — READMISSION MANAGEMENT (OUTPATIENT)
Dept: CALL CENTER | Facility: HOSPITAL | Age: 66
End: 2021-01-13

## 2021-01-13 NOTE — OUTREACH NOTE
LAG < 7 Survey      Responses   [facility] patient discharged from?  [place]   Does the patient have one of the following disease processes/diagnoses(primary or secondary)?  Other   BHLAG <7 Attempt successful?  Yes   Call start time  1625   Call end time  1628   Discharge diagnosis   Chest pain   Is patient permission given to speak with other caregiver?  No   Meds reviewed with patient/caregiver?  Yes   Is the patient having any side effects they believe may be caused by any medication additions or changes?  No   Does the patient have all medications ordered at discharge?  Yes   Is the patient taking all medications as directed (includes completed medication regime)?  Yes   Does the patient have a primary care provider?   Yes   Does the patient have an appointment with their PCP within 7 days of discharge?  Yes   Comments regarding PCP  PCP Samantha MACDONALD. Patient reports that she saw her Dr today.   Has the patient kept scheduled appointments due by today?  Yes   Has home health visited the patient within 72 hours of discharge?  N/A   Psychosocial issues?  No   Did the patient receive a copy of their discharge instructions?  Yes   Nursing interventions  Reviewed instructions with patient   What is the patient's perception of their health status since discharge?  Improving   Is the patient/caregiver able to teach back signs and symptoms related to disease process for when to call PCP?  Yes   Is the patient/caregiver able to teach back signs and symptoms related to disease process for when to call 911?  Yes   Is the patient/caregiver able to teach back the hierarchy of who to call/visit for symptoms/problems? PCP, Specialist, Home health nurse, Urgent Care, ED, 911  Yes   If the patient is a current smoker, are they able to teach back resources for cessation?  Not a smoker   Graduated  Yes   Is the patient interested in additional calls from an ambulatory ?  NOTE:  applies to high risk  patients requiring additional follow-up.  No   Wrap up additional comments  Patient reports that she is doing well. Denies any new questions or concerns today.           Mackenzie Jacobo RN

## 2021-06-09 ENCOUNTER — TELEPHONE (OUTPATIENT)
Dept: OBSTETRICS AND GYNECOLOGY | Facility: CLINIC | Age: 66
End: 2021-06-09

## 2021-06-09 RX ORDER — MEDROXYPROGESTERONE ACETATE 2.5 MG/1
2.5 TABLET ORAL DAILY
Qty: 90 TABLET | Refills: 3 | Status: SHIPPED | OUTPATIENT
Start: 2021-06-09 | End: 2022-06-01

## 2021-06-09 RX ORDER — ESTRADIOL 0.5 MG/1
TABLET ORAL
Qty: 90 TABLET | Refills: 3 | Status: SHIPPED | OUTPATIENT
Start: 2021-06-09 | End: 2022-10-05

## 2021-06-09 NOTE — TELEPHONE ENCOUNTER
Patient called she wanted to follow up on how she has been doing since you wanted her to stop her provera and estrogen medication. She said that she continued to take them after she saw you in October but she was taking 1 each every other day to ween off until her medication ran out which was in February. Since February she has not been taking anything but she says that her hot flashes have become unbearable and she is wondering what she can do or if there is an alternative medication that she can take that wont put her at risk for strokes? She would like a call back about this.

## 2021-06-09 NOTE — TELEPHONE ENCOUNTER
Patient having tremendous hot flashes off her estrogen replacement.  Would like to go back on.  She was doing okay with half milligram estradiol every other day so she will try that but will take her 2.5 mg of Provera daily.  TITUS

## 2021-10-27 ENCOUNTER — APPOINTMENT (OUTPATIENT)
Dept: WOMENS IMAGING | Facility: HOSPITAL | Age: 66
End: 2021-10-27

## 2021-10-27 ENCOUNTER — PROCEDURE VISIT (OUTPATIENT)
Dept: OBSTETRICS AND GYNECOLOGY | Facility: CLINIC | Age: 66
End: 2021-10-27

## 2021-10-27 DIAGNOSIS — Z12.31 VISIT FOR SCREENING MAMMOGRAM: Primary | ICD-10-CM

## 2021-10-27 PROCEDURE — 77067 SCR MAMMO BI INCL CAD: CPT | Performed by: RADIOLOGY

## 2021-10-27 PROCEDURE — 77063 BREAST TOMOSYNTHESIS BI: CPT | Performed by: OBSTETRICS & GYNECOLOGY

## 2021-10-27 PROCEDURE — 77063 BREAST TOMOSYNTHESIS BI: CPT | Performed by: RADIOLOGY

## 2021-10-27 PROCEDURE — 77067 SCR MAMMO BI INCL CAD: CPT | Performed by: OBSTETRICS & GYNECOLOGY

## 2022-01-19 ENCOUNTER — PREP FOR SURGERY (OUTPATIENT)
Dept: OTHER | Facility: HOSPITAL | Age: 67
End: 2022-01-19

## 2022-01-19 ENCOUNTER — TELEPHONE (OUTPATIENT)
Dept: GASTROENTEROLOGY | Facility: CLINIC | Age: 67
End: 2022-01-19

## 2022-01-19 DIAGNOSIS — Z12.11 SCREENING FOR MALIGNANT NEOPLASM OF COLON: Primary | ICD-10-CM

## 2022-01-19 DIAGNOSIS — Z01.818 OTHER SPECIFIED PRE-OPERATIVE EXAMINATION: ICD-10-CM

## 2022-01-31 PROBLEM — Z12.11 SCREENING FOR MALIGNANT NEOPLASM OF COLON: Status: ACTIVE | Noted: 2022-01-31

## 2022-01-31 NOTE — TELEPHONE ENCOUNTER
Return call from patient.  Scheduled at [facility] on 06/03/2022 at 9:30am - arrive 8:30am.  Will mail instructions.

## 2022-03-23 ENCOUNTER — TELEPHONE (OUTPATIENT)
Dept: OBSTETRICS AND GYNECOLOGY | Facility: CLINIC | Age: 67
End: 2022-03-23

## 2022-03-23 DIAGNOSIS — N95.1 MENOPAUSAL STATE: Primary | ICD-10-CM

## 2022-03-23 NOTE — TELEPHONE ENCOUNTER
Pt would like to try an get her bone density on November 3rd at [facility]. She has her annual and mammogram here that day and lives out of town and was hoping to get them all knocked out that day.   Thanks brody

## 2022-06-01 RX ORDER — MEDROXYPROGESTERONE ACETATE 2.5 MG/1
TABLET ORAL
Qty: 90 TABLET | Refills: 2 | Status: SHIPPED | OUTPATIENT
Start: 2022-06-01 | End: 2023-02-21

## 2022-06-03 ENCOUNTER — TELEPHONE (OUTPATIENT)
Dept: GASTROENTEROLOGY | Facility: CLINIC | Age: 67
End: 2022-06-03

## 2022-06-03 ENCOUNTER — HOSPITAL ENCOUNTER (OUTPATIENT)
Facility: SURGERY CENTER | Age: 67
Setting detail: HOSPITAL OUTPATIENT SURGERY
Discharge: HOME | End: 2022-06-03
Attending: INTERNAL MEDICINE | Admitting: INTERNAL MEDICINE

## 2022-06-03 ENCOUNTER — ANESTHESIA (OUTPATIENT)
Dept: SURGERY | Facility: SURGERY CENTER | Age: 67
End: 2022-06-03

## 2022-06-03 ENCOUNTER — ANESTHESIA EVENT (OUTPATIENT)
Dept: SURGERY | Facility: SURGERY CENTER | Age: 67
End: 2022-06-03

## 2022-06-03 VITALS
BODY MASS INDEX: 27.93 KG/M2 | OXYGEN SATURATION: 96 % | HEART RATE: 70 BPM | HEIGHT: 64 IN | WEIGHT: 163.6 LBS | SYSTOLIC BLOOD PRESSURE: 150 MMHG | DIASTOLIC BLOOD PRESSURE: 70 MMHG | TEMPERATURE: 97.8 F | RESPIRATION RATE: 16 BRPM

## 2022-06-03 PROCEDURE — G0121 COLON CA SCRN NOT HI RSK IND: HCPCS | Performed by: INTERNAL MEDICINE

## 2022-06-03 PROCEDURE — 25010000002 PROPOFOL 10 MG/ML EMULSION: Performed by: STUDENT IN AN ORGANIZED HEALTH CARE EDUCATION/TRAINING PROGRAM

## 2022-06-03 RX ORDER — LIDOCAINE HYDROCHLORIDE 10 MG/ML
0.5 INJECTION, SOLUTION INFILTRATION; PERINEURAL ONCE AS NEEDED
Status: DISCONTINUED | OUTPATIENT
Start: 2022-06-03 | End: 2022-06-03 | Stop reason: HOSPADM

## 2022-06-03 RX ORDER — PROMETHAZINE HYDROCHLORIDE 25 MG/1
25 SUPPOSITORY RECTAL ONCE AS NEEDED
Status: DISCONTINUED | OUTPATIENT
Start: 2022-06-03 | End: 2022-06-03 | Stop reason: HOSPADM

## 2022-06-03 RX ORDER — SODIUM CHLORIDE, SODIUM LACTATE, POTASSIUM CHLORIDE, CALCIUM CHLORIDE 600; 310; 30; 20 MG/100ML; MG/100ML; MG/100ML; MG/100ML
1000 INJECTION, SOLUTION INTRAVENOUS CONTINUOUS
Status: DISCONTINUED | OUTPATIENT
Start: 2022-06-03 | End: 2022-06-03 | Stop reason: HOSPADM

## 2022-06-03 RX ORDER — SODIUM CHLORIDE 0.9 % (FLUSH) 0.9 %
10 SYRINGE (ML) INJECTION AS NEEDED
Status: DISCONTINUED | OUTPATIENT
Start: 2022-06-03 | End: 2022-06-03 | Stop reason: HOSPADM

## 2022-06-03 RX ORDER — CLOBETASOL PROPIONATE 0.5 MG/G
CREAM TOPICAL AS NEEDED
COMMUNITY
Start: 2022-02-25

## 2022-06-03 RX ORDER — MAGNESIUM HYDROXIDE 1200 MG/15ML
LIQUID ORAL AS NEEDED
Status: DISCONTINUED | OUTPATIENT
Start: 2022-06-03 | End: 2022-06-03 | Stop reason: HOSPADM

## 2022-06-03 RX ORDER — PROMETHAZINE HYDROCHLORIDE 12.5 MG/1
25 TABLET ORAL ONCE AS NEEDED
Status: DISCONTINUED | OUTPATIENT
Start: 2022-06-03 | End: 2022-06-03 | Stop reason: HOSPADM

## 2022-06-03 RX ORDER — ATORVASTATIN CALCIUM 10 MG/1
1 TABLET, FILM COATED ORAL DAILY
COMMUNITY
Start: 2022-06-01

## 2022-06-03 RX ORDER — PROPOFOL 10 MG/ML
VIAL (ML) INTRAVENOUS AS NEEDED
Status: DISCONTINUED | OUTPATIENT
Start: 2022-06-03 | End: 2022-06-03 | Stop reason: SURG

## 2022-06-03 RX ORDER — LIDOCAINE HYDROCHLORIDE 20 MG/ML
INJECTION, SOLUTION INFILTRATION; PERINEURAL AS NEEDED
Status: DISCONTINUED | OUTPATIENT
Start: 2022-06-03 | End: 2022-06-03 | Stop reason: SURG

## 2022-06-03 RX ADMIN — PROPOFOL 80 MG: 10 INJECTION, EMULSION INTRAVENOUS at 09:32

## 2022-06-03 RX ADMIN — SODIUM CHLORIDE, POTASSIUM CHLORIDE, SODIUM LACTATE AND CALCIUM CHLORIDE 1000 ML: 600; 310; 30; 20 INJECTION, SOLUTION INTRAVENOUS at 08:34

## 2022-06-03 RX ADMIN — LIDOCAINE HYDROCHLORIDE 60 MG: 20 INJECTION, SOLUTION INFILTRATION; PERINEURAL at 09:32

## 2022-06-03 RX ADMIN — PROPOFOL 160 MCG/KG/MIN: 10 INJECTION, EMULSION INTRAVENOUS at 09:32

## 2022-06-03 NOTE — ANESTHESIA PREPROCEDURE EVALUATION
Anesthesia Evaluation     Patient summary reviewed and Nursing notes reviewed   no history of anesthetic complications:  NPO Solid Status: > 8 hours  NPO Liquid Status: > 2 hours           Airway   Mallampati: II  TM distance: >3 FB  Neck ROM: full  Dental - normal exam     Pulmonary - negative pulmonary ROS   Cardiovascular   Exercise tolerance: good (4-7 METS)    (+) hypertension, hyperlipidemia,       Neuro/Psych  (+) headaches,    GI/Hepatic/Renal/Endo    (+)  GERD,      Musculoskeletal     Abdominal    Substance History      OB/GYN          Other   arthritis,                      Anesthesia Plan    ASA 3     MAC   total IV anesthesia(I have reviewed the patient's history with the patient and the chart, including all pertinent laboratory results and imaging. I have explained the risks of anesthesia including but not limited to dental damage, corneal abrasion, nerve injury, MI, stroke, and death. Questions asked and answered. Anesthetic plan discussed with patient and team as indicated. Patient expressed understanding of the above.  )    Anesthetic plan, all risks, benefits, and alternatives have been provided, discussed and informed consent has been obtained with: patient.        CODE STATUS:

## 2022-06-03 NOTE — H&P
Patient Care Team:  Samantha Song PA as PCP - General (Physician Assistant)    CHIEF COMPLAINT: Screening CRC    HISTORY OF PRESENT ILLNESS:  Last exam was 10 years back    Past Medical History:   Diagnosis Date   • Arthritis    • GERD (gastroesophageal reflux disease)    • Hyperlipidemia    • Hypertension    • Migraine    • Plantar fasciitis      Past Surgical History:   Procedure Laterality Date   • CHOLECYSTECTOMY       Family History   Problem Relation Age of Onset   • Breast cancer Maternal Grandmother      Social History     Tobacco Use   • Smoking status: Never Smoker   • Smokeless tobacco: Never Used   Substance Use Topics   • Alcohol use: No   • Drug use: No     Medications Prior to Admission   Medication Sig Dispense Refill Last Dose   • Acetaminophen 325 MG capsule Take 1-2 capsules by mouth 2 (Two) Times a Day As Needed.   Past Week at Unknown time   • aspirin 81 MG EC tablet Take 1 tablet by mouth Daily. 30 tablet 0 5/29/2022 at Unknown time   • atorvastatin (LIPITOR) 10 MG tablet Take 1 tablet by mouth Daily.   Past Week at Unknown time   • celecoxib (CeleBREX) 200 MG capsule Take 200 mg by mouth 2 (Two) Times a Day.   Past Week at Unknown time   • estradiol (ESTRACE) 0.5 MG tablet 1 tablet p.o. every 1 to 2 days. 90 tablet 3 Past Week at Unknown time   • lisinopril (PRINIVIL,ZESTRIL) 10 MG tablet 10 mg Daily.   Past Week at Unknown time   • medroxyPROGESTERone (PROVERA) 2.5 MG tablet TAKE 1 TABLET BY MOUTH ONCE DAILY 90 tablet 2 Past Week at Unknown time   • montelukast (SINGULAIR) 10 MG tablet Take 10 mg by mouth Daily.   Past Week at Unknown time   • omeprazole (PriLOSEC) 40 MG capsule 40 mg Daily.   Past Week at Unknown time   • SUMAtriptan (IMITREX) 50 MG tablet Take 50 mg by mouth Every 2 (Two) Hours As Needed for Migraine. Take one tablet at onset of headache. May repeat dose one time in 2 hours if headache not relieved.   Past Month at Unknown time   • clobetasol (TEMOVATE) 0.05 % cream     "More than a month at Unknown time     Allergies:  Patient has no known allergies.    REVIEW OF SYSTEMS:  Please see the above history of present illness for pertinent positives and negatives.  The remainder of the patient's systems have been reviewed and are negative.     Vital Signs  Temp:  [98 °F (36.7 °C)] 98 °F (36.7 °C)  Heart Rate:  [87] 87  Resp:  [16] 16  BP: (154)/(84) 154/84    Flowsheet Rows    Flowsheet Row First Filed Value   Admission Height 162.6 cm (64\") Documented at 05/31/2022 1405   Admission Weight 76.2 kg (168 lb) Documented at 05/31/2022 1405           Physical Exam:  Physical Exam   Constitutional: Patient appears well-developed and well-nourished and in no acute distress   HEENT:   Head: Normocephalic and atraumatic.   Eyes:  Pupils are equal, round, and reactive to light. EOM are intact. Sclerae are anicteric and non-injected.  Mouth and Throat: Patient has moist mucous membranes. Oropharynx is clear of any erythema or exudate.     Neck: Neck supple. No JVD present. No thyromegaly present. No lymphadenopathy present.  Cardiovascular: Regular rate, regular rhythm, S1 normal and S2 normal.  Exam reveals no gallop and no friction rub.  No murmur heard.  Pulmonary/Chest: Lungs are clear to auscultation bilaterally. No respiratory distress. No wheezes. No rhonchi. No rales.   Abdominal: Soft. Bowel sounds are normal. No distension and no mass. There is no hepatosplenomegaly. There is no tenderness.   Musculoskeletal: Normal Muscle tone  Extremities: No edema. Pulses are palpable in all 4 extremities.  Neurological: Patient is alert and oriented to person, place, and time. Cranial nerves II-XII are grossly intact with no focal deficits.  Skin: Skin is warm. No rash noted. Nails show no clubbing.  No cyanosis or erythema.    Debilities/Disabilities Identified: None  Emotional Behavior: Appropriate     Results Review:   I reviewed the patient's new clinical results.    Lab Results (most recent)     " None          Imaging Results (Most Recent)     None        reviewed    ECG/EMG Results (most recent)     None        reviewed    Assessment & Plan   Screening CRC/  colonoscopy      I discussed the patient's findings and my recommendations with patient.     Dmitry Dela Cruz MD  06/03/22  09:20 EDT    Time: 10 min prior to procedure.

## 2022-06-03 NOTE — BRIEF OP NOTE
COLONOSCOPY  Progress Note    Christiane Smith  6/3/2022    Pre-op Diagnosis:   Screening for malignant neoplasm of colon [Z12.11]       Post-Op Diagnosis Codes:     * Screening for malignant neoplasm of colon [Z12.11]     * Diverticulosis [K57.90]    Procedure/CPT® Codes:        Procedure(s):  COLONOSCOPY    Surgeon(s):  Dmitry Dela Cruz MD    Anesthesia: Monitored Anesthesia Care    Staff:   Endo Technician: Juma Mireles; Benny Acuna, RN  Endo Nurse: Yanna Barnard RN; Yael Wilburn RN         Estimated Blood Loss: none    Urine Voided: * No values recorded between 6/3/2022  9:28 AM and 6/3/2022  9:51 AM *    Specimens:                None          Drains: * No LDAs found *    Findings: Colon to TI good Prep  Sigmoid Diverticulosis          Complications: None          Dmitry Dela Cruz MD     Date: 6/3/2022  Time: 09:52 EDT

## 2022-06-03 NOTE — TELEPHONE ENCOUNTER
Per HIPAA release, LM on patient VM as per below.    Recall entered.    ----- Message from Dmitry Dela Cruz MD sent at 6/3/2022  9:53 AM EDT -----  Normal Exam so recall in 10 years

## 2022-06-03 NOTE — ANESTHESIA POSTPROCEDURE EVALUATION
"Patient: Christiane Smith    Procedure Summary     Date: 06/03/22 Room / Location: [facility]    Anesthesia Start: 0928 Anesthesia Stop: 0954    Procedure: COLONOSCOPY (N/A ) Diagnosis:       Screening for malignant neoplasm of colon      Diverticulosis      (Screening for malignant neoplasm of colon [Z12.11])    Surgeons: Dmitry Dela Cruz MD Provider: Jose You MD    Anesthesia Type: Not recorded ASA Status: 3          Anesthesia Type: No value filed.    Vitals  Vitals Value Taken Time   /68 06/03/22 0954   Temp 36.6 °C (97.8 °F) 06/03/22 0954   Pulse 73 06/03/22 0954   Resp 16 06/03/22 0954   SpO2 97 % 06/03/22 0954           Post Anesthesia Care and Evaluation    Patient location during evaluation: PACU  Patient participation: complete - patient participated  Level of consciousness: awake and alert  Pain management: adequate  Airway patency: patent  Anesthetic complications: No anesthetic complications  PONV Status: controlled  Cardiovascular status: acceptable and hemodynamically stable  Respiratory status: acceptable  Hydration status: acceptable    Comments: /68 (BP Location: Left arm, Patient Position: Lying)   Pulse 73   Temp 36.6 °C (97.8 °F) (Temporal)   Resp 16   Ht 162.6 cm (64\")   Wt 74.2 kg (163 lb 9.6 oz)   SpO2 97%   BMI 28.08 kg/m²       "

## 2022-06-13 ENCOUNTER — TELEPHONE (OUTPATIENT)
Dept: OBSTETRICS AND GYNECOLOGY | Facility: CLINIC | Age: 67
End: 2022-06-13

## 2022-06-13 NOTE — TELEPHONE ENCOUNTER
Provider: DR GUADARRAMA    Caller: NATANAEL ALVAREZ     Relationship to Patient:SELF    Phone Number:[phone] [place] TO [facility]     Reason for Call: HAD TO RESCHEDULE [place] MAMMO & ANNUAL WOULD LIKE DEXASCAN SAME DAY AS APPT 10/31/22

## 2022-06-13 NOTE — TELEPHONE ENCOUNTER
Patient is requesting for her bone density scan to be on the same day as her annual and mammo if possible. (10/31/2022)    Please advise,   Thank you

## 2022-06-13 NOTE — TELEPHONE ENCOUNTER
Pt also had dexa scheduled on 11/3/22 at [facility].  Advised to call [facility] at [phone] to r/s her DEXA to 10/31/22.

## 2022-08-22 ENCOUNTER — TELEPHONE (OUTPATIENT)
Dept: CARDIOLOGY | Facility: CLINIC | Age: 67
End: 2022-08-22

## 2022-10-05 RX ORDER — ESTRADIOL 0.5 MG/1
TABLET ORAL
Qty: 90 TABLET | Refills: 2 | Status: SHIPPED | OUTPATIENT
Start: 2022-10-05

## 2022-10-31 ENCOUNTER — OFFICE VISIT (OUTPATIENT)
Dept: OBSTETRICS AND GYNECOLOGY | Facility: CLINIC | Age: 67
End: 2022-10-31

## 2022-10-31 ENCOUNTER — APPOINTMENT (OUTPATIENT)
Dept: WOMENS IMAGING | Facility: HOSPITAL | Age: 67
End: 2022-10-31

## 2022-10-31 ENCOUNTER — PROCEDURE VISIT (OUTPATIENT)
Dept: OBSTETRICS AND GYNECOLOGY | Facility: CLINIC | Age: 67
End: 2022-10-31

## 2022-10-31 VITALS
WEIGHT: 168 LBS | HEIGHT: 64 IN | DIASTOLIC BLOOD PRESSURE: 87 MMHG | SYSTOLIC BLOOD PRESSURE: 150 MMHG | BODY MASS INDEX: 28.68 KG/M2

## 2022-10-31 DIAGNOSIS — Z12.31 VISIT FOR SCREENING MAMMOGRAM: Primary | ICD-10-CM

## 2022-10-31 DIAGNOSIS — Z78.0 POSTMENOPAUSE: ICD-10-CM

## 2022-10-31 DIAGNOSIS — N39.41 URGE INCONTINENCE: ICD-10-CM

## 2022-10-31 DIAGNOSIS — D25.9 UTERINE LEIOMYOMA, UNSPECIFIED LOCATION: ICD-10-CM

## 2022-10-31 DIAGNOSIS — Z79.890 HORMONE REPLACEMENT THERAPY (HRT): ICD-10-CM

## 2022-10-31 DIAGNOSIS — Z01.419 WOMEN'S ANNUAL ROUTINE GYNECOLOGICAL EXAMINATION: Primary | ICD-10-CM

## 2022-10-31 PROCEDURE — 77063 BREAST TOMOSYNTHESIS BI: CPT | Performed by: RADIOLOGY

## 2022-10-31 PROCEDURE — 77063 BREAST TOMOSYNTHESIS BI: CPT | Performed by: OBSTETRICS & GYNECOLOGY

## 2022-10-31 PROCEDURE — 99213 OFFICE O/P EST LOW 20 MIN: CPT | Performed by: NURSE PRACTITIONER

## 2022-10-31 PROCEDURE — 3015F CERV CANCER SCREEN DOCD: CPT | Performed by: NURSE PRACTITIONER

## 2022-10-31 PROCEDURE — G0101 CA SCREEN;PELVIC/BREAST EXAM: HCPCS | Performed by: NURSE PRACTITIONER

## 2022-10-31 PROCEDURE — 77067 SCR MAMMO BI INCL CAD: CPT | Performed by: OBSTETRICS & GYNECOLOGY

## 2022-10-31 PROCEDURE — 77067 SCR MAMMO BI INCL CAD: CPT | Performed by: RADIOLOGY

## 2022-10-31 RX ORDER — FLUTICASONE PROPIONATE 50 MCG
SPRAY, SUSPENSION (ML) NASAL AS NEEDED
COMMUNITY
Start: 2022-08-09

## 2022-10-31 RX ORDER — ALBUTEROL SULFATE 90 UG/1
AEROSOL, METERED RESPIRATORY (INHALATION) AS NEEDED
COMMUNITY
Start: 2022-08-25 | End: 2022-12-14

## 2022-10-31 RX ORDER — ZINC GLUCONATE 50 MG
50 TABLET ORAL DAILY
COMMUNITY

## 2022-10-31 NOTE — PROGRESS NOTES
GYN Annual Exam     Chief Complaint   Patient presents with   • Gynecologic Exam     AE, Mammo today        Christiane Smith is a 67 y.o. female who presents for annual well woman exam. She is postmenopausal. She denies vaginal spotting or discharge. She completes SBE monthly. She is maintained on HRT. She did try to stop HRT, but had worsening hot flashes and night sweats. She desire to continue use. Not in need of refill at this time.  Hx uterine fibroids. She would like repeat u/s to make sure this is not growing. BP elevated, states this has been a problem since she had Covid, this is being followed by PCP.     She is a patient of Dr. Lyn.  This is my first time meeting Christiane Smith    OB History        1    Para   1    Term   1            AB        Living           SAB        IAB        Ectopic        Molar        Multiple        Live Births                    Mammogram: today  Dexa scan: 2017 osteopenia, has appt scheduled 2022  Colonoscopy:   Last Pap : 10/22, NIL   History of abnormal Pap smear: yes - more than 20 years ago.   Family history of uterine, colon or ovarian cancer: no  Family history of breast cancer: yes - MGM  History of abnormal mammogram: yes - f/u WNL    Menopause:  Bleeding since? no  Vasomotor symptoms: no  HRT: yes  Incontinence?  Some urge incontinence   Dyspareunia: no      Past Medical History:   Diagnosis Date   • Arthritis    • GERD (gastroesophageal reflux disease)    • Hyperlipidemia    • Hypertension    • Migraine    • Plantar fasciitis        Past Surgical History:   Procedure Laterality Date   • CHOLECYSTECTOMY     • COLONOSCOPY N/A 2022    Procedure: COLONOSCOPY;  Surgeon: Dmitry Dela Cruz MD;  Location: [facility] MAIN OR;  Service: Gastroenterology;  Laterality: N/A;  diverticulosis, Hemorrhoids   • URETERAL STENT INSERTION           Current Outpatient Medications:   •  Acetaminophen 325 MG capsule, Take 1-2 capsules by mouth 2  (Two) Times a Day As Needed., Disp: , Rfl:   •  albuterol sulfate  (90 Base) MCG/ACT inhaler, , Disp: , Rfl:   •  aspirin 81 MG EC tablet, Take 1 tablet by mouth Daily., Disp: 30 tablet, Rfl: 0  •  atorvastatin (LIPITOR) 10 MG tablet, Take 1 tablet by mouth Daily., Disp: , Rfl:   •  celecoxib (CeleBREX) 200 MG capsule, Take 200 mg by mouth 2 (Two) Times a Day., Disp: , Rfl:   •  clobetasol (TEMOVATE) 0.05 % cream, , Disp: , Rfl:   •  estradiol (ESTRACE) 0.5 MG tablet, TAKE 1 TABLET BY MOUTH EVERY 1 TO 2 DAYS, Disp: 90 tablet, Rfl: 2  •  fluticasone (FLONASE) 50 MCG/ACT nasal spray, , Disp: , Rfl:   •  lisinopril (PRINIVIL,ZESTRIL) 10 MG tablet, 10 mg Daily., Disp: , Rfl:   •  medroxyPROGESTERone (PROVERA) 2.5 MG tablet, TAKE 1 TABLET BY MOUTH ONCE DAILY, Disp: 90 tablet, Rfl: 2  •  montelukast (SINGULAIR) 10 MG tablet, Take 10 mg by mouth Daily., Disp: , Rfl:   •  omeprazole (PriLOSEC) 40 MG capsule, 40 mg Daily., Disp: , Rfl:   •  SUMAtriptan (IMITREX) 50 MG tablet, Take 50 mg by mouth Every 2 (Two) Hours As Needed for Migraine. Take one tablet at onset of headache. May repeat dose one time in 2 hours if headache not relieved., Disp: , Rfl:   •  zinc gluconate 50 MG tablet, Take 1 tablet by mouth Daily., Disp: , Rfl:     No Known Allergies    Social History     Tobacco Use   • Smoking status: Never   • Smokeless tobacco: Never   Substance Use Topics   • Alcohol use: No   • Drug use: No       Family History   Problem Relation Age of Onset   • Breast cancer Maternal Grandmother        Review of Systems   Constitutional: Negative for chills, fatigue and fever.   Gastrointestinal: Negative for abdominal distention, abdominal pain, nausea and vomiting.   Endocrine: Negative for cold intolerance and heat intolerance.   Genitourinary: Negative for dyspareunia, dysuria, menstrual problem, pelvic pain, vaginal bleeding, vaginal discharge and vaginal pain.        + mild urge incontinence    Musculoskeletal: Negative  "for gait problem.   Skin: Negative for rash.   Neurological: Negative for dizziness and headaches.   Hematological: Does not bruise/bleed easily.   Psychiatric/Behavioral: Negative for behavioral problems.       /87   Ht 162.6 cm (64\")   Wt 76.2 kg (168 lb)   BMI 28.84 kg/m²     Physical Exam  Constitutional:       Appearance: Normal appearance.   Genitourinary:      Vulva, bladder and urethral meatus normal.      No lesions in the vagina.      Right Labia: No rash, tenderness, lesions, skin changes or Bartholin's cyst.     Left Labia: No tenderness, lesions, skin changes, Bartholin's cyst or rash.     No labial fusion noted.      No inguinal adenopathy present in the right or left side.     No vaginal discharge, erythema, tenderness, bleeding or ulceration.      No vaginal prolapse present.     No vaginal atrophy present.       Right Adnexa: not tender, not full, not palpable, no mass present and not absent.     Left Adnexa: not tender, not full, not palpable, no mass present and not absent.     No cervical motion tenderness, discharge, friability, lesion, polyp, nabothian cyst or eversion.      Uterus is not enlarged, fixed, tender, irregular or prolapsed.      No uterine mass detected.     No urethral tenderness or mass present.      Pelvic exam was performed with patient in the lithotomy position.   Breasts:     Breasts are symmetrical.      Right: Present. No swelling, bleeding, inverted nipple, mass, nipple discharge, skin change, tenderness or breast implant.      Left: Present. No swelling, bleeding, inverted nipple, mass, nipple discharge, skin change, tenderness or breast implant.   HENT:      Head: Normocephalic and atraumatic.   Eyes:      Pupils: Pupils are equal, round, and reactive to light.   Cardiovascular:      Rate and Rhythm: Normal rate.   Pulmonary:      Effort: Pulmonary effort is normal.   Abdominal:      General: There is no distension.      Palpations: Abdomen is soft. There is no " mass.      Tenderness: There is no abdominal tenderness. There is no guarding.      Hernia: No hernia is present. There is no hernia in the left inguinal area or right inguinal area.   Musculoskeletal:         General: Normal range of motion.      Cervical back: Normal range of motion and neck supple. No tenderness.   Lymphadenopathy:      Cervical: No cervical adenopathy.      Upper Body:      Right upper body: No supraclavicular, axillary or pectoral adenopathy.      Left upper body: No supraclavicular, axillary or pectoral adenopathy.      Lower Body: No right inguinal adenopathy. No left inguinal adenopathy.   Neurological:      General: No focal deficit present.      Mental Status: She is alert and oriented to person, place, and time.      Cranial Nerves: No cranial nerve deficit.   Skin:     General: Skin is warm and dry.   Psychiatric:         Mood and Affect: Mood normal.         Behavior: Behavior normal.         Thought Content: Thought content normal.         Judgment: Judgment normal.   Vitals and nursing note reviewed.         Assessment    Diagnoses and all orders for this visit:    1. Women's annual routine gynecological examination (Primary)  -     IGP, Rfx Aptima HPV ASCU    2. Postmenopause    3. Hormone replacement therapy (HRT)    4. Urge incontinence    5. Uterine leiomyoma, unspecified location  -     US Non-ob Transvaginal; Future         Plan     1) Breast Health - Clinical breast exam & mammogram yearly, Self breast awareness. Schedule screening mammogram.   2) Pap - collected  3) STD-no  4) Smoking status- nonsmoker  5) Colon health - screening colonoscopy recommended if not up to date  6) Overweight by BMI 28.84  7) Bone health - Weight bearing exercise, dietary calcium recommendations and vitamin D  reviewed.   8) Encouraged 150 minutes of exercise per week if not medically contraindicated  9) Reviewed urge incontinence, symptoms are mild and are only a problem when she is at home.  Encouraged kegel exercises, avoid caffeine and alcohol. Recommend timed voids   10) Will repeat TVUS to further evaluate uterine fibroids   11) Not in need of refill of HRT at this time. She was advised to take provera with estrogen to protect uterus and to call with any vaginal bleeding.     Follow up prn and one year    Melissa Rios, APRN  10/31/2022  10:52 EDT

## 2022-11-15 ENCOUNTER — OFFICE VISIT (OUTPATIENT)
Dept: CARDIOLOGY | Facility: CLINIC | Age: 67
End: 2022-11-15

## 2022-11-15 VITALS
WEIGHT: 168.52 LBS | DIASTOLIC BLOOD PRESSURE: 84 MMHG | BODY MASS INDEX: 28.77 KG/M2 | HEIGHT: 64 IN | SYSTOLIC BLOOD PRESSURE: 148 MMHG | HEART RATE: 76 BPM

## 2022-11-15 DIAGNOSIS — I10 PRIMARY HYPERTENSION: ICD-10-CM

## 2022-11-15 DIAGNOSIS — R06.02 SHORTNESS OF BREATH: Primary | ICD-10-CM

## 2022-11-15 PROBLEM — R07.9 CHEST PAIN: Status: RESOLVED | Noted: 2021-01-07 | Resolved: 2022-11-15

## 2022-11-15 PROBLEM — A08.11 GASTROENTERITIS DUE TO NOROVIRUS: Status: RESOLVED | Noted: 2019-05-10 | Resolved: 2022-11-15

## 2022-11-15 PROCEDURE — 99214 OFFICE O/P EST MOD 30 MIN: CPT | Performed by: INTERNAL MEDICINE

## 2022-11-15 PROCEDURE — 93000 ELECTROCARDIOGRAM COMPLETE: CPT | Performed by: INTERNAL MEDICINE

## 2022-11-15 NOTE — PROGRESS NOTES
Date of Office Visit: 11/15/22  Encounter Provider: Heriberto Elizondo MD  Place of Service: [facility] CARDIOLOGY  Patient Name: Christiane Smith  :1955    Chief Complaint   Patient presents with   • Shortness of Breath   :     HPI:     Ms. Smith is 67 y.o. and presents today for evaluation. I have reviewed prior notes and there are no changes except for any new updates described below. I have also reviewed any information entered into the medical record by the patient or by ancillary staff.     She presented in  with shortness of breath. An echo and treadmill stress test were normal. She presented again in 2021 with dyspnea and atypical chest pain. A another perfusion stress was normal, as was an echo.    Her shortness of breath really got bad after she had COVID in 2020.  She did not require hospitalization but she reports a severe course that she weathered at home.  She has not had orthopnea, PND, or leg swelling.  She has not had any chest pain.  She does get very winded with anything other than the most minor of exertion.  She does not have any symptoms at rest.  She denies palpitations, lightheadedness, or syncope.    Her father had valvular heart disease and required a valve replacement in his early 60s. She doesn't know if he had a CABG as well.     Past Medical History:   Diagnosis Date   • Arthritis    • Gastroenteritis due to norovirus 5/10/2019   • GERD (gastroesophageal reflux disease)    • Hyperlipidemia    • Hypertension    • Migraine    • Plantar fasciitis        Past Surgical History:   Procedure Laterality Date   • CHOLECYSTECTOMY     • COLONOSCOPY N/A 2022    Procedure: COLONOSCOPY;  Surgeon: Dmitry Dela Cruz MD;  Location: [facility] OR;  Service: Gastroenterology;  Laterality: N/A;  diverticulosis, Hemorrhoids   • URETERAL STENT INSERTION         Social History     Socioeconomic History   • Marital status:    •  Number of children: 1   Tobacco Use   • Smoking status: Never   • Smokeless tobacco: Never   Vaping Use   • Vaping Use: Never used   Substance and Sexual Activity   • Alcohol use: No   • Drug use: No   • Sexual activity: Defer       Family History   Problem Relation Age of Onset   • Heart attack Mother 74   • Valvular heart disease Father 60   • Breast cancer Maternal Grandmother        Review of Systems   Constitutional: Positive for malaise/fatigue.   Cardiovascular: Positive for dyspnea on exertion.   All other systems reviewed and are negative.      No Known Allergies      Current Outpatient Medications:   •  Acetaminophen 325 MG capsule, Take 1-2 capsules by mouth 2 (Two) Times a Day As Needed., Disp: , Rfl:   •  albuterol sulfate  (90 Base) MCG/ACT inhaler, As Needed., Disp: , Rfl:   •  aspirin 81 MG EC tablet, Take 1 tablet by mouth Daily., Disp: 30 tablet, Rfl: 0  •  atorvastatin (LIPITOR) 10 MG tablet, Take 1 tablet by mouth Daily., Disp: , Rfl:   •  celecoxib (CeleBREX) 200 MG capsule, Take 200 mg by mouth 2 (Two) Times a Day., Disp: , Rfl:   •  clobetasol (TEMOVATE) 0.05 % cream, As Needed., Disp: , Rfl:   •  estradiol (ESTRACE) 0.5 MG tablet, TAKE 1 TABLET BY MOUTH EVERY 1 TO 2 DAYS, Disp: 90 tablet, Rfl: 2  •  fluticasone (FLONASE) 50 MCG/ACT nasal spray, As Needed., Disp: , Rfl:   •  lisinopril (PRINIVIL,ZESTRIL) 10 MG tablet, 10 mg Daily., Disp: , Rfl:   •  medroxyPROGESTERone (PROVERA) 2.5 MG tablet, TAKE 1 TABLET BY MOUTH ONCE DAILY, Disp: 90 tablet, Rfl: 2  •  omeprazole (PriLOSEC) 40 MG capsule, 40 mg Daily., Disp: , Rfl:   •  SUMAtriptan (IMITREX) 50 MG tablet, Take 50 mg by mouth Every 2 (Two) Hours As Needed for Migraine. Take one tablet at onset of headache. May repeat dose one time in 2 hours if headache not relieved., Disp: , Rfl:   •  zinc gluconate 50 MG tablet, Take 1 tablet by mouth Daily., Disp: , Rfl:   •  montelukast (SINGULAIR) 10 MG tablet, Take 10 mg by mouth Daily., Disp: ,  "Rfl:       Objective:     Vitals:    11/15/22 1409   BP: 148/84   BP Location: Right arm   Pulse: 76   Weight: 76.4 kg (168 lb 8.3 oz)   Height: 162.6 cm (64\")     Body mass index is 28.93 kg/m².    Vitals reviewed.   Constitutional:       Appearance: Healthy appearance. Well-developed.   Eyes:      Conjunctiva/sclera: Conjunctivae normal.   HENT:      Head: Normocephalic.      Nose: Nose normal.         Comments: masked  Neck:      Vascular: No JVD. JVD normal.      Lymphadenopathy: No cervical adenopathy.   Pulmonary:      Effort: Pulmonary effort is normal.      Breath sounds: Normal breath sounds.   Cardiovascular:      Normal rate. Regular rhythm.      Murmurs: There is no murmur.   Pulses:     Intact distal pulses.   Edema:     Peripheral edema absent.   Abdominal:      Palpations: Abdomen is soft.      Tenderness: There is no abdominal tenderness.   Musculoskeletal: Normal range of motion.      Cervical back: Normal range of motion. Skin:     General: Skin is warm and dry.      Findings: No rash.   Neurological:      General: No focal deficit present.      Mental Status: Alert and oriented to person, place, and time.      Cranial Nerves: No cranial nerve deficit.   Psychiatric:         Behavior: Behavior normal.         Thought Content: Thought content normal.         Judgment: Judgment normal.           ECG 12 Lead    Date/Time: 11/15/2022 2:25 PM  Performed by: Heriberto Elizondo MD  Authorized by: Heriberto Elizondo MD   Comparison: compared with previous ECG   Similar to previous ECG  Rhythm: sinus rhythm  Conduction: conduction normal  ST Segments: ST segments normal  T Waves: T waves normal  QRS axis: normal  Other: no other findings    Clinical impression: normal ECG              Assessment:       Diagnosis Plan   1. Shortness of breath        2. Primary hypertension               Plan:       Mrs Smith reports ongoing shortness of breath for the last several years, much worse after having COVID in November " 2020.  I am going to get a stress echocardiogram.  If that is normal, I recommend that she have PFTs performed.    Just in case any of this could be due to to elevated end-diastolic pressure, and may end up adding HCTZ to her lisinopril.  This should help with her elevated blood pressure as well.  I will wait till the test has resulted before I make any change.    Sincerely,       Heriberto Elizondo MD

## 2022-11-28 ENCOUNTER — HOSPITAL ENCOUNTER (OUTPATIENT)
Dept: CARDIOLOGY | Facility: HOSPITAL | Age: 67
Discharge: HOME | End: 2022-11-28
Admitting: INTERNAL MEDICINE

## 2022-11-28 ENCOUNTER — APPOINTMENT (OUTPATIENT)
Dept: CARDIOLOGY | Facility: HOSPITAL | Age: 67
End: 2022-11-28

## 2022-11-28 VITALS
DIASTOLIC BLOOD PRESSURE: 80 MMHG | HEART RATE: 80 BPM | WEIGHT: 167.55 LBS | BODY MASS INDEX: 28.6 KG/M2 | HEIGHT: 64 IN | SYSTOLIC BLOOD PRESSURE: 174 MMHG

## 2022-11-28 DIAGNOSIS — R06.02 SHORTNESS OF BREATH: ICD-10-CM

## 2022-11-28 DIAGNOSIS — I10 PRIMARY HYPERTENSION: ICD-10-CM

## 2022-11-28 LAB
AORTIC DIMENSIONLESS INDEX: 0.8 (DI)
BH CV ECHO MEAS - AO MAX PG: 8.2 MMHG
BH CV ECHO MEAS - AO MEAN PG: 5 MMHG
BH CV ECHO MEAS - AO V2 MAX: 143 CM/SEC
BH CV ECHO MEAS - AO V2 VTI: 30.7 CM
BH CV ECHO MEAS - AVA(I,D): 2.26 CM2
BH CV ECHO MEAS - EDV(CUBED): 74.1 ML
BH CV ECHO MEAS - EDV(MOD-SP2): 62 ML
BH CV ECHO MEAS - EDV(MOD-SP4): 64 ML
BH CV ECHO MEAS - EF(MOD-BP): 62 %
BH CV ECHO MEAS - EF(MOD-SP2): 58.1 %
BH CV ECHO MEAS - EF(MOD-SP4): 64.1 %
BH CV ECHO MEAS - ESV(CUBED): 22 ML
BH CV ECHO MEAS - ESV(MOD-SP2): 26 ML
BH CV ECHO MEAS - ESV(MOD-SP4): 23 ML
BH CV ECHO MEAS - FS: 33.3 %
BH CV ECHO MEAS - IVS/LVPW: 0.9 CM
BH CV ECHO MEAS - IVSD: 0.9 CM
BH CV ECHO MEAS - LAT PEAK E' VEL: 7.7 CM/SEC
BH CV ECHO MEAS - LV MASS(C)D: 127.8 GRAMS
BH CV ECHO MEAS - LV MAX PG: 4.8 MMHG
BH CV ECHO MEAS - LV MEAN PG: 3 MMHG
BH CV ECHO MEAS - LV V1 MAX: 109 CM/SEC
BH CV ECHO MEAS - LV V1 VTI: 24.4 CM
BH CV ECHO MEAS - LVIDD: 4.2 CM
BH CV ECHO MEAS - LVIDS: 2.8 CM
BH CV ECHO MEAS - LVOT AREA: 2.8 CM2
BH CV ECHO MEAS - LVOT DIAM: 1.9 CM
BH CV ECHO MEAS - LVPWD: 1 CM
BH CV ECHO MEAS - MED PEAK E' VEL: 6.5 CM/SEC
BH CV ECHO MEAS - MR MAX PG: 158.2 MMHG
BH CV ECHO MEAS - MR MAX VEL: 628.8 CM/SEC
BH CV ECHO MEAS - MR MEAN PG: 106.7 MMHG
BH CV ECHO MEAS - MR MEAN VEL: 485.8 CM/SEC
BH CV ECHO MEAS - MR VTI: 237.8 CM
BH CV ECHO MEAS - MV A MAX VEL: 90.7 CM/SEC
BH CV ECHO MEAS - MV DEC SLOPE: 383.4 CM/SEC2
BH CV ECHO MEAS - MV DEC TIME: 0.19 MSEC
BH CV ECHO MEAS - MV E MAX VEL: 89.1 CM/SEC
BH CV ECHO MEAS - MV E/A: 0.98
BH CV ECHO MEAS - MV MAX PG: 4.5 MMHG
BH CV ECHO MEAS - MV MEAN PG: 1.93 MMHG
BH CV ECHO MEAS - MV P1/2T: 76.2 MSEC
BH CV ECHO MEAS - MV V2 VTI: 31.2 CM
BH CV ECHO MEAS - MVA(P1/2T): 2.9 CM2
BH CV ECHO MEAS - MVA(VTI): 2.23 CM2
BH CV ECHO MEAS - PA ACC TIME: 0.14 SEC
BH CV ECHO MEAS - PA PR(ACCEL): 14 MMHG
BH CV ECHO MEAS - PA V2 MAX: 79.8 CM/SEC
BH CV ECHO MEAS - RAP SYSTOLE: 3 MMHG
BH CV ECHO MEAS - RV MAX PG: 1.77 MMHG
BH CV ECHO MEAS - RV V1 MAX: 66.6 CM/SEC
BH CV ECHO MEAS - RV V1 VTI: 14.5 CM
BH CV ECHO MEAS - RVSP: 22 MMHG
BH CV ECHO MEAS - SV(LVOT): 69.5 ML
BH CV ECHO MEAS - SV(MOD-SP2): 36 ML
BH CV ECHO MEAS - SV(MOD-SP4): 41 ML
BH CV ECHO MEAS - TR MAX PG: 19.9 MMHG
BH CV ECHO MEAS - TR MAX VEL: 223 CM/SEC
BH CV ECHO MEASUREMENTS AVERAGE E/E' RATIO: 12.55
BH CV STRESS DURATION MIN STAGE 1: 3
BH CV STRESS DURATION SEC STAGE 1: 0
BH CV STRESS GRADE STAGE 1: 10
BH CV STRESS METS STAGE 1: 4.6
BH CV STRESS PROTOCOL 1: NORMAL
BH CV STRESS SPEED STAGE 1: 1.7
BH CV STRESS STAGE 1: 1
BH CV XLRA - RV BASE: 2.7 CM
BH CV XLRA - RV LENGTH: 7.1 CM
BH CV XLRA - RV MID: 2.3 CM
BH CV XLRA - TDI S': 8.6 CM/SEC
LEFT ATRIUM VOLUME INDEX: 21.6 ML/M2
MAXIMAL PREDICTED HEART RATE: 151 BPM
SINUS: 2.8 CM
STRESS BASELINE BP: NORMAL MMHG
STRESS BASELINE HR: 76 BPM
STRESS O2 SAT REST: 98 %
STRESS POST O2 SAT PEAK: 98 %
STRESS TARGET HR: 128 BPM

## 2022-11-28 PROCEDURE — 93306 TTE W/DOPPLER COMPLETE: CPT

## 2022-11-28 PROCEDURE — 93306 TTE W/DOPPLER COMPLETE: CPT | Performed by: INTERNAL MEDICINE

## 2022-11-28 PROCEDURE — 25010000002 PERFLUTREN (DEFINITY) 8.476 MG IN SODIUM CHLORIDE (PF) 0.9 % 10 ML INJECTION: Performed by: INTERNAL MEDICINE

## 2022-11-28 RX ADMIN — SODIUM CHLORIDE 2 ML: 9 INJECTION INTRAMUSCULAR; INTRAVENOUS; SUBCUTANEOUS at 17:19

## 2022-11-29 ENCOUNTER — HOSPITAL ENCOUNTER (OUTPATIENT)
Dept: PET IMAGING | Facility: HOSPITAL | Age: 67
Discharge: HOME | End: 2022-11-29
Admitting: OBSTETRICS & GYNECOLOGY

## 2022-11-29 DIAGNOSIS — N95.1 MENOPAUSAL STATE: ICD-10-CM

## 2022-11-29 PROCEDURE — 77080 DXA BONE DENSITY AXIAL: CPT

## 2022-11-29 RX ORDER — HYDROCHLOROTHIAZIDE 25 MG/1
25 TABLET ORAL DAILY
Qty: 90 TABLET | Refills: 3 | Status: SHIPPED | OUTPATIENT
Start: 2022-11-29

## 2022-11-29 NOTE — PROGRESS NOTES
I called -- normal LVSF, + diastolic dysfunction.  It was ordered as a stress echo, but they tried several times in the absolutely could not get an IV placed.  She has not been having chest pain, and as we have a reasonable explanation for why she is been feeling short of breath, can avoid a stress test for now.  I added HCTZ 25 mg and I want her to come back and see Nicolás in 6 weeks.  If she is feeling better, then that is great.  If she does not, we could then proceed with PFTs and deciding whether or not we want to reattempt a perfusion stress test.

## 2022-11-29 NOTE — PROGRESS NOTES
Please tell patient that her DEXA scan is almost identical to 2017 with normal spine and just a little osteopenia of her hip.  Thank you.

## 2022-11-30 ENCOUNTER — TELEPHONE (OUTPATIENT)
Dept: OBSTETRICS AND GYNECOLOGY | Facility: CLINIC | Age: 67
End: 2022-11-30

## 2022-11-30 NOTE — TELEPHONE ENCOUNTER
----- Message from Erick Toth MD sent at 11/29/2022  3:42 PM EST -----  Please tell patient that her DEXA scan is almost identical to 2017 with normal spine and just a little osteopenia of her hip.  Thank you.

## 2022-12-14 ENCOUNTER — HOSPITAL ENCOUNTER (EMERGENCY)
Facility: HOSPITAL | Age: 67
Discharge: HOME | End: 2022-12-14
Attending: EMERGENCY MEDICINE | Admitting: EMERGENCY MEDICINE

## 2022-12-14 ENCOUNTER — APPOINTMENT (OUTPATIENT)
Dept: GENERAL RADIOLOGY | Facility: HOSPITAL | Age: 67
End: 2022-12-14

## 2022-12-14 ENCOUNTER — TELEPHONE (OUTPATIENT)
Dept: CARDIOLOGY | Facility: CLINIC | Age: 67
End: 2022-12-14

## 2022-12-14 VITALS
DIASTOLIC BLOOD PRESSURE: 71 MMHG | OXYGEN SATURATION: 97 % | BODY MASS INDEX: 28.68 KG/M2 | WEIGHT: 168 LBS | TEMPERATURE: 98.9 F | SYSTOLIC BLOOD PRESSURE: 136 MMHG | HEIGHT: 64 IN | RESPIRATION RATE: 20 BRPM | HEART RATE: 82 BPM

## 2022-12-14 DIAGNOSIS — R06.00 DYSPNEA, UNSPECIFIED TYPE: Primary | ICD-10-CM

## 2022-12-14 LAB
ALBUMIN SERPL-MCNC: 4.8 G/DL (ref 3.5–5.2)
ALBUMIN/GLOB SERPL: 2 G/DL
ALP SERPL-CCNC: 101 U/L (ref 39–117)
ALT SERPL W P-5'-P-CCNC: 18 U/L (ref 1–33)
ANION GAP SERPL CALCULATED.3IONS-SCNC: 11 MMOL/L (ref 5–15)
AST SERPL-CCNC: 18 U/L (ref 1–32)
BASOPHILS # BLD AUTO: 0.06 10*3/MM3 (ref 0–0.2)
BASOPHILS NFR BLD AUTO: 0.7 % (ref 0–1.5)
BILIRUB SERPL-MCNC: 0.4 MG/DL (ref 0–1.2)
BILIRUB UR QL STRIP: NEGATIVE
BUN SERPL-MCNC: 16 MG/DL (ref 8–23)
BUN/CREAT SERPL: 22.9 (ref 7–25)
CALCIUM SPEC-SCNC: 9.9 MG/DL (ref 8.6–10.5)
CHLORIDE SERPL-SCNC: 103 MMOL/L (ref 98–107)
CLARITY UR: CLEAR
CO2 SERPL-SCNC: 24 MMOL/L (ref 22–29)
COLOR UR: YELLOW
CREAT SERPL-MCNC: 0.7 MG/DL (ref 0.57–1)
D DIMER PPP FEU-MCNC: 0.34 MCGFEU/ML (ref 0–0.69)
DEPRECATED RDW RBC AUTO: 39 FL (ref 37–54)
EGFRCR SERPLBLD CKD-EPI 2021: 93.8 ML/MIN/1.73
EOSINOPHIL # BLD AUTO: 0.03 10*3/MM3 (ref 0–0.4)
EOSINOPHIL NFR BLD AUTO: 0.4 % (ref 0.3–6.2)
ERYTHROCYTE [DISTWIDTH] IN BLOOD BY AUTOMATED COUNT: 12.1 % (ref 12.3–15.4)
FLUAV RNA RESP QL NAA+PROBE: NOT DETECTED
FLUBV RNA RESP QL NAA+PROBE: NOT DETECTED
GLOBULIN UR ELPH-MCNC: 2.4 GM/DL
GLUCOSE SERPL-MCNC: 92 MG/DL (ref 65–99)
GLUCOSE UR STRIP-MCNC: NEGATIVE MG/DL
HCT VFR BLD AUTO: 41.2 % (ref 34–46.6)
HGB BLD-MCNC: 14.1 G/DL (ref 12–15.9)
HGB UR QL STRIP.AUTO: NEGATIVE
HOLD SPECIMEN: NORMAL
IMM GRANULOCYTES # BLD AUTO: 0.02 10*3/MM3 (ref 0–0.05)
IMM GRANULOCYTES NFR BLD AUTO: 0.2 % (ref 0–0.5)
KETONES UR QL STRIP: NEGATIVE
LEUKOCYTE ESTERASE UR QL STRIP.AUTO: NEGATIVE
LYMPHOCYTES # BLD AUTO: 2.77 10*3/MM3 (ref 0.7–3.1)
LYMPHOCYTES NFR BLD AUTO: 33.5 % (ref 19.6–45.3)
MCH RBC QN AUTO: 29.7 PG (ref 26.6–33)
MCHC RBC AUTO-ENTMCNC: 34.2 G/DL (ref 31.5–35.7)
MCV RBC AUTO: 86.9 FL (ref 79–97)
MONOCYTES # BLD AUTO: 0.44 10*3/MM3 (ref 0.1–0.9)
MONOCYTES NFR BLD AUTO: 5.3 % (ref 5–12)
NEUTROPHILS NFR BLD AUTO: 4.96 10*3/MM3 (ref 1.7–7)
NEUTROPHILS NFR BLD AUTO: 59.9 % (ref 42.7–76)
NITRITE UR QL STRIP: NEGATIVE
NRBC BLD AUTO-RTO: 0 /100 WBC (ref 0–0.2)
NT-PROBNP SERPL-MCNC: 70 PG/ML (ref 0–900)
PH UR STRIP.AUTO: 7 [PH] (ref 4.5–8)
PLATELET # BLD AUTO: 257 10*3/MM3 (ref 140–450)
PMV BLD AUTO: 10.3 FL (ref 6–12)
POTASSIUM SERPL-SCNC: 4.8 MMOL/L (ref 3.5–5.2)
PROT SERPL-MCNC: 7.2 G/DL (ref 6–8.5)
PROT UR QL STRIP: NEGATIVE
RBC # BLD AUTO: 4.74 10*6/MM3 (ref 3.77–5.28)
SARS-COV-2 RNA RESP QL NAA+PROBE: NOT DETECTED
SODIUM SERPL-SCNC: 138 MMOL/L (ref 136–145)
SP GR UR STRIP: 1.01 (ref 1–1.03)
TROPONIN T SERPL-MCNC: <0.01 NG/ML (ref 0–0.03)
TROPONIN T SERPL-MCNC: <0.01 NG/ML (ref 0–0.03)
UROBILINOGEN UR QL STRIP: NORMAL
WBC NRBC COR # BLD: 8.28 10*3/MM3 (ref 3.4–10.8)
WHOLE BLOOD HOLD COAG: NORMAL
WHOLE BLOOD HOLD SPECIMEN: NORMAL

## 2022-12-14 PROCEDURE — 93005 ELECTROCARDIOGRAM TRACING: CPT

## 2022-12-14 PROCEDURE — 81003 URINALYSIS AUTO W/O SCOPE: CPT

## 2022-12-14 PROCEDURE — 84484 ASSAY OF TROPONIN QUANT: CPT

## 2022-12-14 PROCEDURE — 87636 SARSCOV2 & INF A&B AMP PRB: CPT

## 2022-12-14 PROCEDURE — 83880 ASSAY OF NATRIURETIC PEPTIDE: CPT

## 2022-12-14 PROCEDURE — 36415 COLL VENOUS BLD VENIPUNCTURE: CPT

## 2022-12-14 PROCEDURE — 80053 COMPREHEN METABOLIC PANEL: CPT

## 2022-12-14 PROCEDURE — 93010 ELECTROCARDIOGRAM REPORT: CPT | Performed by: INTERNAL MEDICINE

## 2022-12-14 PROCEDURE — 71046 X-RAY EXAM CHEST 2 VIEWS: CPT

## 2022-12-14 PROCEDURE — 85025 COMPLETE CBC W/AUTO DIFF WBC: CPT

## 2022-12-14 PROCEDURE — 99284 EMERGENCY DEPT VISIT MOD MDM: CPT

## 2022-12-14 PROCEDURE — 85379 FIBRIN DEGRADATION QUANT: CPT

## 2022-12-14 RX ORDER — SODIUM CHLORIDE 0.9 % (FLUSH) 0.9 %
10 SYRINGE (ML) INJECTION AS NEEDED
Status: DISCONTINUED | OUTPATIENT
Start: 2022-12-14 | End: 2022-12-14 | Stop reason: HOSPADM

## 2022-12-14 NOTE — TELEPHONE ENCOUNTER
Can she see Nicolás this week?  If her symptoms worsen before then, she would need to go to the ED.    Juanpablo strange

## 2022-12-14 NOTE — TELEPHONE ENCOUNTER
Called and left VM. Will continue to try to reach patient.     Tyra Haro RN  Triage [facility] [place]

## 2022-12-14 NOTE — DISCHARGE INSTRUCTIONS
Continue medications as directed.  Follow-up with your PCP as above.  Return to the ED for worsening symptoms or medical emergencies.

## 2022-12-14 NOTE — TELEPHONE ENCOUNTER
"Patient calls today saying that today she just feels \"so bad.\" I asked if she could elaborate a little more and all she told me was \" I don't know what comes over me but I just don't feel good for a few mins and then I feel fine.\" She said she guesses she can describe it as a lightheaded sensation. She said her BP is 150/79 HR 88. I asked if she has checked her vitals when she has one of these episodes and she said no. I told her to do so to see if there is any change.     She also states that she is still SOA despite taking the hctz. She said she does not notice any swelling or weight gain though.     Tyra Haro RN  Triage [facility] [place]    "

## 2022-12-14 NOTE — ED PROVIDER NOTES
EMERGENCY DEPARTMENT ENCOUNTER      Room Number: 11/11    History is provided by the patient, no translation services needed    HPI:    Chief complaint: Shortness of air    Location: Chest    Quality/Severity: Patient denies any pain at this time.    Timing/Duration: Since this morning    Modifying Factors: None    Associated Symptoms: None    Narrative: Pt is a 67 y.o. female who presents complaining of shortness of breath that started this morning.  Patient denies any cardiac or pulmonary history.  Patient states that she usually feels great, but upon awakening this morning she has felt ill.  She states that with her shortness of breath, she had an odd sensation in her chest and all over.  Patient denies any chest pain or cough.  She denies any fevers, chills, body aches, nausea, vomiting, diaphoresis.  Patient denies any pain at this time.      PMD: Samantha Song PA    REVIEW OF SYSTEMS  Review of Systems   Constitutional: Negative for chills and fever.   HENT: Negative for congestion, rhinorrhea and sore throat.    Eyes: Negative for photophobia and visual disturbance.   Respiratory: Positive for shortness of breath. Negative for cough and chest tightness.    Cardiovascular: Negative for chest pain, palpitations and leg swelling.   Gastrointestinal: Negative for abdominal pain, blood in stool, constipation, diarrhea, nausea and vomiting.   Genitourinary: Negative for difficulty urinating, dysuria, flank pain and hematuria.   Musculoskeletal: Negative for back pain, gait problem, myalgias, neck pain and neck stiffness.   Skin: Negative for color change, pallor, rash and wound.   Neurological: Negative for dizziness, syncope, weakness, numbness and headaches.   Psychiatric/Behavioral: Negative for confusion. The patient is not nervous/anxious.          PAST MEDICAL HISTORY  Active Ambulatory Problems     Diagnosis Date Noted   • Hypertension 05/10/2019   • GERD (gastroesophageal reflux disease) 05/10/2019    • Screening for malignant neoplasm of colon 01/31/2022     Resolved Ambulatory Problems     Diagnosis Date Noted   • Chest pain 05/08/2019   • Gastroenteritis due to norovirus 05/10/2019   • Chest pain 01/07/2021     Past Medical History:   Diagnosis Date   • Arthritis    • Hyperlipidemia    • Migraine    • Plantar fasciitis        PAST SURGICAL HISTORY  Past Surgical History:   Procedure Laterality Date   • CHOLECYSTECTOMY     • COLONOSCOPY N/A 06/03/2022    Procedure: COLONOSCOPY;  Surgeon: Dmitry Dela Cruz MD;  Location: [facility] OR;  Service: Gastroenterology;  Laterality: N/A;  diverticulosis, Hemorrhoids   • URETERAL STENT INSERTION         FAMILY HISTORY  Family History   Problem Relation Age of Onset   • Heart attack Mother 74   • Valvular heart disease Father 60   • Breast cancer Maternal Grandmother        SOCIAL HISTORY  Social History     Socioeconomic History   • Marital status:    • Number of children: 1   Tobacco Use   • Smoking status: Never   • Smokeless tobacco: Never   Vaping Use   • Vaping Use: Never used   Substance and Sexual Activity   • Alcohol use: No   • Drug use: No   • Sexual activity: Defer       ALLERGIES  Patient has no known allergies.      Current Facility-Administered Medications:   •  sodium chloride 0.9 % flush 10 mL, 10 mL, Intravenous, PRN, Emergency, Triage Protocol, MD    Current Outpatient Medications:   •  Acetaminophen 325 MG capsule, Take 1-2 capsules by mouth 2 (Two) Times a Day As Needed., Disp: , Rfl:   •  aspirin 81 MG EC tablet, Take 1 tablet by mouth Daily., Disp: 30 tablet, Rfl: 0  •  atorvastatin (LIPITOR) 10 MG tablet, Take 1 tablet by mouth Daily., Disp: , Rfl:   •  celecoxib (CeleBREX) 200 MG capsule, Take 200 mg by mouth 2 (Two) Times a Day., Disp: , Rfl:   •  clobetasol (TEMOVATE) 0.05 % cream, As Needed., Disp: , Rfl:   •  estradiol (ESTRACE) 0.5 MG tablet, TAKE 1 TABLET BY MOUTH EVERY 1 TO 2 DAYS, Disp: 90 tablet, Rfl: 2  •  fluticasone  (FLONASE) 50 MCG/ACT nasal spray, As Needed., Disp: , Rfl:   •  hydroCHLOROthiazide (HYDRODIURIL) 25 MG tablet, Take 1 tablet by mouth Daily., Disp: 90 tablet, Rfl: 3  •  lisinopril (PRINIVIL,ZESTRIL) 10 MG tablet, 10 mg Daily., Disp: , Rfl:   •  medroxyPROGESTERone (PROVERA) 2.5 MG tablet, TAKE 1 TABLET BY MOUTH ONCE DAILY, Disp: 90 tablet, Rfl: 2  •  montelukast (SINGULAIR) 10 MG tablet, Take 10 mg by mouth Daily., Disp: , Rfl:   •  omeprazole (PriLOSEC) 40 MG capsule, 40 mg Daily., Disp: , Rfl:   •  SUMAtriptan (IMITREX) 50 MG tablet, Take 50 mg by mouth Every 2 (Two) Hours As Needed for Migraine. Take one tablet at onset of headache. May repeat dose one time in 2 hours if headache not relieved., Disp: , Rfl:   •  zinc gluconate 50 MG tablet, Take 1 tablet by mouth Daily., Disp: , Rfl:     PHYSICAL EXAM  ED Triage Vitals [12/14/22 1517]   Temp Heart Rate Resp BP SpO2   98.9 °F (37.2 °C) 85 20 (!) 185/87 98 %      Temp src Heart Rate Source Patient Position BP Location FiO2 (%)   Oral Monitor Lying Right arm --       Physical Exam  Vitals and nursing note reviewed.   Constitutional:       General: She is not in acute distress.     Appearance: She is well-developed. She is not ill-appearing, toxic-appearing or diaphoretic.      Interventions: She is not intubated.  HENT:      Head: Normocephalic and atraumatic.   Eyes:      Extraocular Movements: Extraocular movements intact.      Conjunctiva/sclera: Conjunctivae normal.      Pupils: Pupils are equal, round, and reactive to light.   Neck:      Vascular: No hepatojugular reflux or JVD.      Trachea: No tracheal deviation.   Cardiovascular:      Rate and Rhythm: Normal rate and regular rhythm.      Heart sounds: Normal heart sounds.     No friction rub.   Pulmonary:      Effort: Pulmonary effort is normal. No tachypnea, bradypnea, accessory muscle usage or respiratory distress. She is not intubated.      Breath sounds: Normal breath sounds. No stridor. No decreased  breath sounds, wheezing, rhonchi or rales.   Chest:      Chest wall: No mass, deformity, tenderness, crepitus or edema.   Abdominal:      General: Bowel sounds are normal. There is no distension.      Palpations: Abdomen is soft. There is no mass.      Tenderness: There is no abdominal tenderness. There is no guarding or rebound.   Musculoskeletal:         General: Normal range of motion.      Cervical back: Normal range of motion and neck supple.      Right lower leg: No tenderness. No edema.      Left lower leg: No tenderness. No edema.   Skin:     General: Skin is warm and dry.      Coloration: Skin is not cyanotic or pale.      Findings: No ecchymosis, erythema or rash.      Nails: There is no clubbing.   Neurological:      Mental Status: She is alert and oriented to person, place, and time.   Psychiatric:         Mood and Affect: Mood and affect normal.           LAB RESULTS  Lab Results (last 24 hours)     Procedure Component Value Units Date/Time    CBC & Differential [[id number]]  (Abnormal) Collected: 12/14/22 1544    Specimen: Blood Updated: 12/14/22 1604    Narrative:      The following orders were created for panel order CBC & Differential.  Procedure                               Abnormality         Status                     ---------                               -----------         ------                     CBC Auto Differential[[id number]]        Abnormal            Final result                 Please view results for these tests on the individual orders.    Comprehensive Metabolic Panel [[id number]] Collected: 12/14/22 1544    Specimen: Blood Updated: 12/14/22 1623     Glucose 92 mg/dL      BUN 16 mg/dL      Creatinine 0.70 mg/dL      Sodium 138 mmol/L      Potassium 4.8 mmol/L      Chloride 103 mmol/L      CO2 24.0 mmol/L      Calcium 9.9 mg/dL      Total Protein 7.2 g/dL      Albumin 4.80 g/dL      ALT (SGPT) 18 U/L      AST (SGOT) 18 U/L      Alkaline Phosphatase 101 U/L      Total Bilirubin 0.4  mg/dL      Globulin 2.4 gm/dL      A/G Ratio 2.0 g/dL      BUN/Creatinine Ratio 22.9     Anion Gap 11.0 mmol/L      eGFR 93.8 mL/min/1.73      Comment: National Kidney Foundation and American Society of Nephrology (ASN) Task Force recommended calculation based on the Chronic Kidney Disease Epidemiology Collaboration (CKD-EPI) equation refit without adjustment for race.       Narrative:      GFR Normal >60  Chronic Kidney Disease <60  Kidney Failure <15      BNP [[id number]]  (Normal) Collected: 12/14/22 1544    Specimen: Blood Updated: 12/14/22 1636     proBNP 70.0 pg/mL     Narrative:      Among patients with dyspnea, NT-proBNP is highly sensitive for the detection of acute congestive heart failure. In addition NT-proBNP of <300 pg/ml effectively rules out acute congestive heart failure with 99% negative predictive value.    Results may be falsely decreased if patient taking Biotin.      Troponin [[id number]]  (Normal) Collected: 12/14/22 1544    Specimen: Blood Updated: 12/14/22 1636     Troponin T <0.010 ng/mL     Narrative:      Troponin T Reference Range:  <= 0.03 ng/mL-   Negative for AMI  >0.03 ng/mL-     Abnormal for myocardial necrosis.  Clinicians would have to utilize clinical acumen, EKG, Troponin and serial changes to determine if it is an Acute Myocardial Infarction or myocardial injury due to an underlying chronic condition.       Results may be falsely decreased if patient taking Biotin.      CBC Auto Differential [[id number]]  (Abnormal) Collected: 12/14/22 1544    Specimen: Blood Updated: 12/14/22 1604     WBC 8.28 10*3/mm3      RBC 4.74 10*6/mm3      Hemoglobin 14.1 g/dL      Hematocrit 41.2 %      MCV 86.9 fL      MCH 29.7 pg      MCHC 34.2 g/dL      RDW 12.1 %      RDW-SD 39.0 fl      MPV 10.3 fL      Platelets 257 10*3/mm3      Neutrophil % 59.9 %      Lymphocyte % 33.5 %      Monocyte % 5.3 %      Eosinophil % 0.4 %      Basophil % 0.7 %      Immature Grans % 0.2 %      Neutrophils, Absolute 4.96  "10*3/mm3      Lymphocytes, Absolute 2.77 10*3/mm3      Monocytes, Absolute 0.44 10*3/mm3      Eosinophils, Absolute 0.03 10*3/mm3      Basophils, Absolute 0.06 10*3/mm3      Immature Grans, Absolute 0.02 10*3/mm3      nRBC 0.0 /100 WBC     D-dimer, Quantitative [[id number]]  (Normal) Collected: 12/14/22 1544    Specimen: Blood Updated: 12/14/22 1629     D-Dimer, Quantitative 0.34 MCGFEU/mL     Narrative:      According to the assay 's published package insert, a normal (<0.50 MCGFEU/mL) D-dimer result in conjunction with a non-high clinical probability assessment, excludes deep vein thrombosis (DVT) and pulmonary embolism (PE) with high sensitivity.    D-dimer values increase with age and this can make VTE exclusion of an older population difficult. To address this, the American College of Physicians, based on best available evidence and recent guidelines, recommends that clinicians use age-adjusted D-dimer thresholds in patients greater than 50 years of age with: a) a low probability of PE who do not meet all Pulmonary Embolism Rule Out Criteria, or b) in those with intermediate probability of PE.   The formula for an age-adjusted D-dimer cut-off is \"age/100\".  For example, a 60 year old patient would have an age-adjusted cut-off of 0.60 MCGFEU/mL and an 80 year old 0.80 MCGFEU/mL.    COVID-19 and FLU A/B PCR - Swab, Nasopharynx [[id number]]  (Normal) Collected: 12/14/22 1544    Specimen: Swab from Nasopharynx Updated: 12/14/22 1621     COVID19 Not Detected     Influenza A PCR Not Detected     Influenza B PCR Not Detected    Narrative:      Fact sheet for providers: https://www.fda.gov/media/223813/download    Fact sheet for patients: https://www.fda.gov/media/791958/download    Test performed by PCR.    Urinalysis With Microscopic If Indicated (No Culture) - Urine, Clean Catch [[id number]]  (Normal) Collected: 12/14/22 1553    Specimen: Urine, Clean Catch Updated: 12/14/22 1605     Color, UA Yellow     " Appearance, UA Clear     pH, UA 7.0     Specific Gravity, UA 1.015     Glucose, UA Negative     Ketones, UA Negative     Bilirubin, UA Negative     Blood, UA Negative     Protein, UA Negative     Leuk Esterase, UA Negative     Nitrite, UA Negative     Urobilinogen, UA 0.2 E.U./dL    Narrative:      Urine microscopic not indicated.    Troponin [[id number]]  (Normal) Collected: 12/14/22 1802    Specimen: Blood Updated: 12/14/22 1855     Troponin T <0.010 ng/mL     Narrative:      Troponin T Reference Range:  <= 0.03 ng/mL-   Negative for AMI  >0.03 ng/mL-     Abnormal for myocardial necrosis.  Clinicians would have to utilize clinical acumen, EKG, Troponin and serial changes to determine if it is an Acute Myocardial Infarction or myocardial injury due to an underlying chronic condition.       Results may be falsely decreased if patient taking Biotin.              I ordered the above labs and reviewed the results    RADIOLOGY  XR Chest 2 View    Result Date: 12/14/2022  CR Chest 2 Vws INDICATION:  Shortness of air COMPARISON:  None. FINDINGS: PA and lateral views of the chest.  Heart and mediastinal contours are normal. The lungs are clear. No pneumothorax or pleural effusion.      No acute cardiopulmonary findings. Signer Name: ELLYN DAMICO MD  Signed: 12/14/2022 5:22 PM  Workstation Name: [facility]  [facility]      I ordered the above radiologic testing and reviewed the results    PROCEDURES  Procedures      PROGRESS AND CONSULTS  ED Course as of 12/14/22 1907   Wed Dec 14, 2022   1545 Patient appears well.  Respiratory rate and effort within normal limits.  She is able to speak in 7-12 word sentences between breaths. Vital signs stable.  Patient is hypertensive. [AH]   1705 Patient states that she feels much better at this time.  She has no complaints at this time.  She advises that her shortness of breath has improved.  The results that I have gotten back so far were discussed with the  patient.  She expressed understanding. [AH]   1832 EKG         EKG time / Interpretation time: 1526 / 1528  Rhythm/Rate: Sinus, 78   IN: 203  QRS, axis: -10  QTc 443  ST and T waves: No acute ST segment changes or T wave abnormalities.  EKG Tracing Interpreted Contemporaneously by me, independently viewed by me and MD. [AH]   1906 Results discussed with the patient.  Follow-up instructions given.  Return to the ED instructions given. [AH]      ED Course User Index  [AH] Marleny Middleton PA-C           MEDICAL DECISION MAKING    MDM     My differential diagnosis for dyspnea includes but is not limited to:  Asthma, COPD, pneumonia, pulmonary embolus, acute respiratory distress syndrome, pneumothorax, pleural effusion, pulmonary fibrosis, congestive heart failure, myocardial infarction, DKA, uremia, acidosis, sepsis, anemia, drug related, hyperventilation, CNS disease  DIAGNOSIS  Final diagnoses:   Dyspnea, unspecified type       Latest Documented Vital Signs:  As of 19:07 EST  BP- 136/71 HR- 82 Temp- 98.9 °F (37.2 °C) (Oral) O2 sat- 97%    DISPOSITION  Pt discharged    Discussed pertinent findings with the patient/family.  Patient/Family voiced understanding of need to follow-up for recheck and further testing as needed.  Return to the Emergency Department warnings were given.         Medication List      No changes were made to your prescriptions during this visit.              Follow-up Information     Samantha Song PA. Call today.    Specialty: Physician Assistant  Why: to schedule follow up  Contact information:  [address]  [place] [address]  [phone]             Go to  [facility].    Specialty: Emergency Medicine  Why: If symptoms worsen  Contact information:  [address] [address] [phone]                         Dictated utilizing Dragon dictation     Marleny Middleton PA-C  12/14/22 [id number]       Marleny Middleton PA-C  12/14/22  1907

## 2022-12-15 LAB — QT INTERVAL: 387 MS

## 2022-12-15 NOTE — TELEPHONE ENCOUNTER
Patient states that she ended up going to the ER yesterday since she felt so bad. She said everything checked out fine and they discharged her. She states that she is still SOA. I went ahead and scheduled her to see Nicolás tomorrow for a follow-up.    Tyra Haro RN  Triage [facility]

## 2022-12-20 ENCOUNTER — OFFICE VISIT (OUTPATIENT)
Dept: CARDIOLOGY | Facility: CLINIC | Age: 67
End: 2022-12-20

## 2022-12-20 VITALS
DIASTOLIC BLOOD PRESSURE: 80 MMHG | OXYGEN SATURATION: 98 % | SYSTOLIC BLOOD PRESSURE: 124 MMHG | HEART RATE: 82 BPM | RESPIRATION RATE: 16 BRPM | HEIGHT: 64 IN | WEIGHT: 164 LBS | BODY MASS INDEX: 28 KG/M2

## 2022-12-20 DIAGNOSIS — R06.02 SHORTNESS OF BREATH: Primary | ICD-10-CM

## 2022-12-20 DIAGNOSIS — R07.9 CHEST PAIN, UNSPECIFIED TYPE: ICD-10-CM

## 2022-12-20 PROCEDURE — 99214 OFFICE O/P EST MOD 30 MIN: CPT | Performed by: NURSE PRACTITIONER

## 2022-12-20 PROCEDURE — 93000 ELECTROCARDIOGRAM COMPLETE: CPT | Performed by: NURSE PRACTITIONER

## 2022-12-20 NOTE — PROGRESS NOTES
Date of Office Visit: 2022  Encounter Provider: THAIS Patricio  Place of Service: [facility] CARDIOLOGY  Patient Name: Christiane Smith  :1955  Primary Cardiologist: Dr. Elizondo    CC:  HAILEY Singh    HPI: Christiane Smith is a pleasant 67 y.o. female who presents 2022 for cardiac follow up.  I reviewed her past medical records including notes, labs and testing in preparation for today's visit.    She presented in  with shortness of breath. An echo and treadmill stress test were normal. She presented again in 2021 with dyspnea and atypical chest pain. A another perfusion stress was normal, as was an echo.     Her shortness of breath really got bad after she had COVID in 2020.  She did not require hospitalization but she reports a severe course that she weathered at home.  She has not had orthopnea, PND, or leg swelling.  She has not had any chest pain.  She does get very winded with anything other than the most minor of exertion.  She does not have any symptoms at rest.  She denies palpitations, lightheadedness, or syncope.     Her father had valvular heart disease and required a valve replacement in his early 60s. She doesn't know if he had a CABG as well.     She saw Dr. Elizondo in 2022 and was still reporting shortness of breath for the last several years, much worse after having COVID in 2020.  Stress echo was ordered. If that is normal, I recommend that she have PFTs performed.  They were unable to get an IV started so she cannot have the completed test.  Per Dr. Elizondo she did have normal LV S and F and  Grade II diastolic dysfunction.  She noted that it was ordered as a stress echo but because of the inability to get IV it was not completed as the stress echo.  She was not having any chest pain so she stated the stress test could wait for now.  Dr. Elizondo ordered HCTZ 25 mg to see if this would help.  She noted that  if she was not feeling better we would then proceed with PFTs and then decide whether or not we wanted to try to reattempt the perfusion stress test.    Patient returns today in follow-up.  She states she still just not feeling well.  She is not herself.  She continues to have palpitations, chest heaviness sometimes associated with nausea.  She is very fatigued.  She is short of breath that feels like it is just getting different.  She has to stop and rest with any activity.  Her  has noticed a big change as well.  She denies any lower extremity edema.  She has not had any syncope or presyncopal episodes.  She states she had 1 episode of dizziness last week.  She is taking her medications as directed.  She was seen in the ED on 10/14/2022 for shortness of breath.  Her work-up was unremarkable.  Her labs were all within normal limits including her proBNP.  Chest x-ray was negative.  EKG unchanged.         Past Medical History:   Diagnosis Date   • Arthritis    • Gastroenteritis due to norovirus 5/10/2019   • GERD (gastroesophageal reflux disease)    • Hyperlipidemia    • Hypertension    • Migraine    • Plantar fasciitis        Past Surgical History:   Procedure Laterality Date   • CHOLECYSTECTOMY     • COLONOSCOPY N/A 06/03/2022    Procedure: COLONOSCOPY;  Surgeon: Dmitry Dela Cruz MD;  Location: [facility] MAIN OR;  Service: Gastroenterology;  Laterality: N/A;  diverticulosis, Hemorrhoids   • URETERAL STENT INSERTION         Social History     Socioeconomic History   • Marital status:    • Number of children: 1   Tobacco Use   • Smoking status: Never   • Smokeless tobacco: Never   Vaping Use   • Vaping Use: Never used   Substance and Sexual Activity   • Alcohol use: No   • Drug use: No   • Sexual activity: Defer       Family History   Problem Relation Age of Onset   • Heart attack Mother 74   • Valvular heart disease Father 60   • Breast cancer Maternal Grandmother        The following portion of  "the patient's history were reviewed and updated as appropriate: past medical history, past surgical history, past social history, past family history, allergies, current medications, and problem list.    Review of Systems   Constitutional: Positive for malaise/fatigue. Negative for diaphoresis and fever.   HENT: Negative for congestion, hearing loss, hoarse voice, nosebleeds and sore throat.    Eyes: Negative for photophobia, vision loss in left eye, vision loss in right eye and visual disturbance.   Cardiovascular: Positive for chest pain (heaviness with SOA) and palpitations (\"feels funny\"). Negative for dyspnea on exertion, irregular heartbeat, leg swelling, near-syncope, orthopnea, paroxysmal nocturnal dyspnea and syncope.   Respiratory: Positive for shortness of breath. Negative for cough, hemoptysis, sleep disturbances due to breathing, snoring, sputum production and wheezing.    Endocrine: Negative for cold intolerance, heat intolerance, polydipsia, polyphagia and polyuria.   Hematologic/Lymphatic: Negative for bleeding problem. Does not bruise/bleed easily.   Skin: Negative for color change, dry skin, poor wound healing, rash and suspicious lesions.   Musculoskeletal: Negative for arthritis, back pain, falls, gout, joint pain, joint swelling, muscle cramps, muscle weakness and myalgias.   Gastrointestinal: Negative for bloating, abdominal pain, constipation, diarrhea, dysphagia, melena, nausea and vomiting.   Neurological: Positive for dizziness (1 episode last week). Negative for excessive daytime sleepiness, headaches, light-headedness, loss of balance, numbness, paresthesias, seizures, vertigo and weakness.   Psychiatric/Behavioral: Negative for depression, memory loss and substance abuse. The patient is not nervous/anxious.        No Known Allergies      Current Outpatient Medications:   •  Acetaminophen 325 MG capsule, Take 1-2 capsules by mouth 2 (Two) Times a Day As Needed., Disp: , Rfl:   •  aspirin " "81 MG EC tablet, Take 1 tablet by mouth Daily., Disp: 30 tablet, Rfl: 0  •  atorvastatin (LIPITOR) 10 MG tablet, Take 1 tablet by mouth Daily., Disp: , Rfl:   •  celecoxib (CeleBREX) 200 MG capsule, Take 200 mg by mouth 2 (Two) Times a Day., Disp: , Rfl:   •  clobetasol (TEMOVATE) 0.05 % cream, As Needed., Disp: , Rfl:   •  estradiol (ESTRACE) 0.5 MG tablet, TAKE 1 TABLET BY MOUTH EVERY 1 TO 2 DAYS, Disp: 90 tablet, Rfl: 2  •  fluticasone (FLONASE) 50 MCG/ACT nasal spray, As Needed., Disp: , Rfl:   •  hydroCHLOROthiazide (HYDRODIURIL) 25 MG tablet, Take 1 tablet by mouth Daily., Disp: 90 tablet, Rfl: 3  •  lisinopril (PRINIVIL,ZESTRIL) 10 MG tablet, 10 mg Daily., Disp: , Rfl:   •  medroxyPROGESTERone (PROVERA) 2.5 MG tablet, TAKE 1 TABLET BY MOUTH ONCE DAILY, Disp: 90 tablet, Rfl: 2  •  montelukast (SINGULAIR) 10 MG tablet, Take 10 mg by mouth Daily., Disp: , Rfl:   •  omeprazole (PriLOSEC) 40 MG capsule, 40 mg Daily., Disp: , Rfl:   •  SUMAtriptan (IMITREX) 50 MG tablet, Take 50 mg by mouth Every 2 (Two) Hours As Needed for Migraine. Take one tablet at onset of headache. May repeat dose one time in 2 hours if headache not relieved., Disp: , Rfl:   •  zinc gluconate 50 MG tablet, Take 1 tablet by mouth Daily., Disp: , Rfl:         Objective:     Vitals:    12/20/22 1442   BP: 124/80   Pulse: 82   Resp: 16   SpO2: 98%   Weight: 74.4 kg (164 lb)   Height: 162.6 cm (64\")     Body mass index is 28.15 kg/m².      Vitals reviewed.   Constitutional:       General: Not in acute distress.     Appearance: Well-developed and not in distress.   Eyes:      General:         Right eye: No discharge.         Left eye: No discharge.      Conjunctiva/sclera: Conjunctivae normal.   HENT:      Head: Normocephalic and atraumatic.      Right Ear: External ear normal.      Left Ear: External ear normal.      Nose: Nose normal.   Neck:      Thyroid: No thyromegaly.      Vascular: No JVD.      Trachea: No tracheal deviation.      " Lymphadenopathy: No cervical adenopathy.   Pulmonary:      Effort: Pulmonary effort is normal. No respiratory distress.      Breath sounds: Normal breath sounds. No wheezing. No rales.   Chest:      Chest wall: Not tender to palpatation.   Cardiovascular:      Normal rate. Regular rhythm.      No gallop.   Pulses:     Intact distal pulses.   Edema:     Peripheral edema absent.   Abdominal:      General: There is no distension.      Palpations: Abdomen is soft.      Tenderness: There is no abdominal tenderness.   Musculoskeletal: Normal range of motion.         General: No tenderness or deformity.      Cervical back: Normal range of motion and neck supple. Skin:     General: Skin is warm and dry.      Findings: No erythema or rash.   Neurological:      Mental Status: Alert and oriented to person, place, and time.      Coordination: Coordination normal.   Psychiatric:         Behavior: Behavior normal.         Thought Content: Thought content normal.         Cognition and Memory: Cognition normal.         Judgment: Judgment normal.               ECG 12 Lead    Date/Time: 12/20/2022 3:42 PM  Performed by: Cherise Coleman APRN  Authorized by: Cherise Coleman APRN   Comparison: compared with previous ECG from 12/14/2022  Similar to previous ECG  Rhythm: sinus rhythm  Rate: normal  Conduction: conduction normal  ST Segments: ST segments normal  T Waves: T waves normal  QRS axis: normal    Clinical impression: normal ECG              Assessment:       Diagnosis Plan   1. Shortness of breath  Stress Test With Myocardial Perfusion One Day      2. Chest pain, unspecified type  Stress Test With Myocardial Perfusion One Day             Plan:       1/2.  She continues to have shortness of breath accompanied with chest heaviness.  She was unable to finish the stress echo secondary to not able to access an IV site.  We will check a stress test.    3.  Hypertension-well-controlled    4.  Hyperlipidemia-continue lipid-lowering  therapy.  She is currently on atorvastatin 10 mg daily.    Check stress test    Addendum 12/27/2022-labs received from PCP dated 9/16/2022 shows CMP with glucose of 100, creatinine 0.0 with normal electrolytes and liver function test.  Cholesterol panel shows triglycerides 60, total cholesterol 143.  HDL 51 and LDL 80.    As always, it has been a pleasure to participate in your patient's care. Thank you.       Sincerely,       THAIS Patricio      Current Outpatient Medications:   •  Acetaminophen 325 MG capsule, Take 1-2 capsules by mouth 2 (Two) Times a Day As Needed., Disp: , Rfl:   •  aspirin 81 MG EC tablet, Take 1 tablet by mouth Daily., Disp: 30 tablet, Rfl: 0  •  atorvastatin (LIPITOR) 10 MG tablet, Take 1 tablet by mouth Daily., Disp: , Rfl:   •  celecoxib (CeleBREX) 200 MG capsule, Take 200 mg by mouth 2 (Two) Times a Day., Disp: , Rfl:   •  clobetasol (TEMOVATE) 0.05 % cream, As Needed., Disp: , Rfl:   •  estradiol (ESTRACE) 0.5 MG tablet, TAKE 1 TABLET BY MOUTH EVERY 1 TO 2 DAYS, Disp: 90 tablet, Rfl: 2  •  fluticasone (FLONASE) 50 MCG/ACT nasal spray, As Needed., Disp: , Rfl:   •  hydroCHLOROthiazide (HYDRODIURIL) 25 MG tablet, Take 1 tablet by mouth Daily., Disp: 90 tablet, Rfl: 3  •  lisinopril (PRINIVIL,ZESTRIL) 10 MG tablet, 10 mg Daily., Disp: , Rfl:   •  medroxyPROGESTERone (PROVERA) 2.5 MG tablet, TAKE 1 TABLET BY MOUTH ONCE DAILY, Disp: 90 tablet, Rfl: 2  •  montelukast (SINGULAIR) 10 MG tablet, Take 10 mg by mouth Daily., Disp: , Rfl:   •  omeprazole (PriLOSEC) 40 MG capsule, 40 mg Daily., Disp: , Rfl:   •  SUMAtriptan (IMITREX) 50 MG tablet, Take 50 mg by mouth Every 2 (Two) Hours As Needed for Migraine. Take one tablet at onset of headache. May repeat dose one time in 2 hours if headache not relieved., Disp: , Rfl:   •  zinc gluconate 50 MG tablet, Take 1 tablet by mouth Daily., Disp: , Rfl:       Dictated utilizing Dragon dictation

## 2022-12-27 ENCOUNTER — HOSPITAL ENCOUNTER (OUTPATIENT)
Dept: NUCLEAR MEDICINE | Facility: HOSPITAL | Age: 67
Discharge: HOME | End: 2022-12-27

## 2022-12-27 ENCOUNTER — HOSPITAL ENCOUNTER (OUTPATIENT)
Dept: CARDIOLOGY | Facility: HOSPITAL | Age: 67
Discharge: HOME | End: 2022-12-27

## 2022-12-27 DIAGNOSIS — R06.02 SHORTNESS OF BREATH: Primary | ICD-10-CM

## 2022-12-27 DIAGNOSIS — R06.02 SHORTNESS OF BREATH: ICD-10-CM

## 2022-12-27 DIAGNOSIS — R07.9 CHEST PAIN, UNSPECIFIED TYPE: ICD-10-CM

## 2022-12-27 LAB
BH CV REST NUCLEAR ISOTOPE DOSE: 11.8 MCI
BH CV STRESS BP STAGE 1: NORMAL
BH CV STRESS BP STAGE 2: NORMAL
BH CV STRESS DURATION MIN STAGE 1: 3
BH CV STRESS DURATION MIN STAGE 2: 3
BH CV STRESS DURATION SEC STAGE 1: 0
BH CV STRESS DURATION SEC STAGE 2: 0
BH CV STRESS GRADE STAGE 1: 10
BH CV STRESS GRADE STAGE 2: 12
BH CV STRESS HR STAGE 1: 113
BH CV STRESS HR STAGE 2: 133
BH CV STRESS METS STAGE 1: 4.6
BH CV STRESS METS STAGE 2: 7.1
BH CV STRESS NUCLEAR ISOTOPE DOSE: 35 MCI
BH CV STRESS PROTOCOL 1: NORMAL
BH CV STRESS RECOVERY BP: NORMAL MMHG
BH CV STRESS RECOVERY HR: 85 BPM
BH CV STRESS SPEED STAGE 1: 1.7
BH CV STRESS SPEED STAGE 2: 2.5
BH CV STRESS STAGE 1: 1
BH CV STRESS STAGE 2: 2
LV EF NUC BP: 78 %
MAXIMAL PREDICTED HEART RATE: 151 BPM
PERCENT MAX PREDICTED HR: 88.08 %
STRESS BASELINE BP: NORMAL MMHG
STRESS BASELINE HR: 70 BPM
STRESS O2 SAT REST: 99 %
STRESS PERCENT HR: 104 %
STRESS POST ESTIMATED WORKLOAD: 7.1 METS
STRESS POST EXERCISE DUR MIN: 6 MIN
STRESS POST EXERCISE DUR SEC: 0 SEC
STRESS POST O2 SAT PEAK: 99 %
STRESS POST PEAK BP: NORMAL MMHG
STRESS POST PEAK HR: 133 BPM
STRESS TARGET HR: 128 BPM

## 2022-12-27 PROCEDURE — 93016 CV STRESS TEST SUPVJ ONLY: CPT | Performed by: INTERNAL MEDICINE

## 2022-12-27 PROCEDURE — 0 TECHNETIUM SESTAMIBI: Performed by: NURSE PRACTITIONER

## 2022-12-27 PROCEDURE — 93017 CV STRESS TEST TRACING ONLY: CPT

## 2022-12-27 PROCEDURE — A9500 TC99M SESTAMIBI: HCPCS | Performed by: NURSE PRACTITIONER

## 2022-12-27 PROCEDURE — 78452 HT MUSCLE IMAGE SPECT MULT: CPT | Performed by: INTERNAL MEDICINE

## 2022-12-27 PROCEDURE — 78452 HT MUSCLE IMAGE SPECT MULT: CPT

## 2022-12-27 PROCEDURE — 93018 CV STRESS TEST I&R ONLY: CPT | Performed by: INTERNAL MEDICINE

## 2022-12-27 RX ADMIN — TECHNETIUM TC 99M SESTAMIBI 1 DOSE: 1 INJECTION INTRAVENOUS at 08:37

## 2022-12-27 RX ADMIN — TECHNETIUM TC 99M SESTAMIBI 1 DOSE: 1 INJECTION INTRAVENOUS at 07:22

## 2022-12-27 NOTE — PROGRESS NOTES
Stress test is normal.  I have made a referral to pulmonology for her continued SOA.    Please call.

## 2022-12-28 ENCOUNTER — TELEPHONE (OUTPATIENT)
Dept: CARDIOLOGY | Facility: CLINIC | Age: 67
End: 2022-12-28

## 2022-12-28 NOTE — TELEPHONE ENCOUNTER
Caller: Christiane Smith    Relationship: Self    Best call back number: [phone]    What is the best time to reach you: ANYTIME    Who are you requesting to speak with (clinical staff, provider,  specific staff member): ANYONE    What was the call regarding: PT HAD STRESS TEST DONE YESTERDAY AND SPOKE WITH OFFICE TODAY ABOUT RESULTS - PT FORGOT TO CLARIFY WHETHER APPT ON 01.10.23 IS STILL NEEDED AS IT WAS MADE IN NOV     Do you require a callback: YES PLEASE, VM IS OKAY

## 2023-02-21 RX ORDER — MEDROXYPROGESTERONE ACETATE 2.5 MG/1
TABLET ORAL
Qty: 90 TABLET | Refills: 1 | Status: SHIPPED | OUTPATIENT
Start: 2023-02-21

## 2023-08-01 RX ORDER — MEDROXYPROGESTERONE ACETATE 2.5 MG/1
TABLET ORAL
Qty: 90 TABLET | Refills: 1 | Status: SHIPPED | OUTPATIENT
Start: 2023-08-01

## 2023-11-14 ENCOUNTER — PROCEDURE VISIT (OUTPATIENT)
Dept: OBSTETRICS AND GYNECOLOGY | Facility: CLINIC | Age: 68
End: 2023-11-14
Payer: MEDICARE

## 2023-11-14 DIAGNOSIS — Z12.31 VISIT FOR SCREENING MAMMOGRAM: Primary | ICD-10-CM

## 2023-12-01 NOTE — PROGRESS NOTES
Date of Office Visit: 23  Encounter Provider: Peter Wilson MA  Place of Service: [facility] CARDIOLOGY  Patient Name: Christiane Smith  :1955    No chief complaint on file.  :     HPI:     Ms. Smith is 68 y.o. and presents today for evaluation. I have reviewed prior notes and there are no changes except for any new updates described below. I have also reviewed any information entered into the medical record by the patient or by ancillary staff.     She presented in  with shortness of breath. An echo and treadmill stress test were normal. She presented again in 2021 with dyspnea and atypical chest pain. A another perfusion stress was normal, as was an echo.    Her shortness of breath really got bad after she had COVID in 2020.  She did not require hospitalization but she reports a severe course that she weathered at home.  She has not had orthopnea, PND, or leg swelling.  She has not had any chest pain.  She does get very winded with anything other than the most minor of exertion.  She does not have any symptoms at rest.  She denies palpitations, lightheadedness, or syncope.    Her father had valvular heart disease and required a valve replacement in his early 60s. She doesn't know if he had a CABG as well.     Past Medical History:   Diagnosis Date   • Arthritis    • Gastroenteritis due to norovirus 5/10/2019   • GERD (gastroesophageal reflux disease)    • Hyperlipidemia    • Hypertension    • Migraine    • Plantar fasciitis        Past Surgical History:   Procedure Laterality Date   • CHOLECYSTECTOMY     • COLONOSCOPY N/A 2022    Procedure: COLONOSCOPY;  Surgeon: Dmitry Dela Cruz MD;  Location: [facility] OR;  Service: Gastroenterology;  Laterality: N/A;  diverticulosis, Hemorrhoids   • URETERAL STENT INSERTION         Social History     Socioeconomic History   • Marital status:    • Number of children: 1   Tobacco Use   •  Smoking status: Never   • Smokeless tobacco: Never   Vaping Use   • Vaping Use: Never used   Substance and Sexual Activity   • Alcohol use: No   • Drug use: No   • Sexual activity: Defer       Family History   Problem Relation Age of Onset   • Heart attack Mother 74   • Valvular heart disease Father 60   • Breast cancer Maternal Grandmother        Review of Systems   Constitutional: Positive for malaise/fatigue.   Cardiovascular:  Positive for dyspnea on exertion.   All other systems reviewed and are negative.      No Known Allergies      Current Outpatient Medications:   •  Acetaminophen 325 MG capsule, Take 1-2 capsules by mouth 2 (Two) Times a Day As Needed., Disp: , Rfl:   •  aspirin 81 MG EC tablet, Take 1 tablet by mouth Daily., Disp: 30 tablet, Rfl: 0  •  atorvastatin (LIPITOR) 10 MG tablet, Take 1 tablet by mouth Daily., Disp: , Rfl:   •  celecoxib (CeleBREX) 200 MG capsule, Take 200 mg by mouth 2 (Two) Times a Day., Disp: , Rfl:   •  clobetasol (TEMOVATE) 0.05 % cream, As Needed., Disp: , Rfl:   •  estradiol (ESTRACE) 0.5 MG tablet, TAKE 1 TABLET BY MOUTH EVERY 1 TO 2 DAYS, Disp: 90 tablet, Rfl: 2  •  fluticasone (FLONASE) 50 MCG/ACT nasal spray, As Needed., Disp: , Rfl:   •  hydroCHLOROthiazide (HYDRODIURIL) 25 MG tablet, Take 1 tablet by mouth Daily., Disp: 90 tablet, Rfl: 3  •  lisinopril (PRINIVIL,ZESTRIL) 10 MG tablet, 10 mg Daily., Disp: , Rfl:   •  medroxyPROGESTERone (PROVERA) 2.5 MG tablet, TAKE 1 TABLET BY MOUTH ONCE DAILY, Disp: 90 tablet, Rfl: 1  •  montelukast (SINGULAIR) 10 MG tablet, Take 10 mg by mouth Daily., Disp: , Rfl:   •  omeprazole (PriLOSEC) 40 MG capsule, 40 mg Daily., Disp: , Rfl:   •  SUMAtriptan (IMITREX) 50 MG tablet, Take 50 mg by mouth Every 2 (Two) Hours As Needed for Migraine. Take one tablet at onset of headache. May repeat dose one time in 2 hours if headache not relieved., Disp: , Rfl:   •  zinc gluconate 50 MG tablet, Take 1 tablet by mouth Daily., Disp: , Rfl:        Objective:     There were no vitals filed for this visit.    There is no height or weight on file to calculate BMI.    Vitals reviewed.   Constitutional:       Appearance: Healthy appearance. Well-developed.   Eyes:      Conjunctiva/sclera: Conjunctivae normal.   HENT:      Head: Normocephalic.      Nose: Nose normal.         Comments: masked  Neck:      Vascular: No JVD. JVD normal.      Lymphadenopathy: No cervical adenopathy.   Pulmonary:      Effort: Pulmonary effort is normal.      Breath sounds: Normal breath sounds.   Cardiovascular:      Normal rate. Regular rhythm.      Murmurs: There is no murmur.   Pulses:     Intact distal pulses.   Edema:     Peripheral edema absent.   Abdominal:      Palpations: Abdomen is soft.      Tenderness: There is no abdominal tenderness.   Musculoskeletal: Normal range of motion.      Cervical back: Normal range of motion. Skin:     General: Skin is warm and dry.      Findings: No rash.   Neurological:      General: No focal deficit present.      Mental Status: Alert and oriented to person, place, and time.      Cranial Nerves: No cranial nerve deficit.   Psychiatric:         Behavior: Behavior normal.         Thought Content: Thought content normal.         Judgment: Judgment normal.       Procedures        Assessment:      No diagnosis found.         Plan:       Mrs Smith reports ongoing shortness of breath for the last several years, much worse after having COVID in November 2020.  I am going to get a stress echocardiogram.  If that is normal, I recommend that she have PFTs performed.    Just in case any of this could be due to to elevated end-diastolic pressure, and may end up adding HCTZ to her lisinopril.  This should help with her elevated blood pressure as well.  I will wait till the test has resulted before I make any change.    Sincerely,       Peter Wilson MA

## 2023-12-01 NOTE — PROGRESS NOTES
RM:________     PCP: Samantha Song PA    : 1955  AGE: 68 y.o.  EST PATIENT     REASON FOR VISIT/  CC:        BP Readings from Last 3 Encounters:   22 124/80   22 136/71   22 174/80      Wt Readings from Last 3 Encounters:   22 74.4 kg (164 lb)   22 76.2 kg (168 lb)   22 76 kg (167 lb 8.8 oz)        WT: ____________ BP: __________L __________R HR______    CHEST PAIN: _____________    SOA: _____________PALPS: _______________     LIGHTHEADED: ___________FATIGUE: ________________ EDEMA __________    ALLERGIES:Patient has no known allergies. SMOKING HISTORY:  Social History     Tobacco Use    Smoking status: Never    Smokeless tobacco: Never   Vaping Use    Vaping Use: Never used   Substance Use Topics    Alcohol use: No    Drug use: No     CAFFEINE USE_________________  ALCOHOL ______________________

## 2023-12-01 NOTE — PROGRESS NOTES
Date of Office Visit: 23  Encounter Provider: Heriberto Elizondo MD  Place of Service: [facility] CARDIOLOGY  Patient Name: Christiane Smith  :1955    Chief Complaint   Patient presents with    Hypertension   :     HPI:     Ms. Smith is 68 y.o. and presents today in follow up. I have reviewed prior notes and there are no changes except for any new updates described below. I have also reviewed any information entered into the medical record by the patient or by ancillary staff.     She presented in  with shortness of breath. An echo and treadmill stress test were normal. She presented again in 2021 with dyspnea and atypical chest pain after having had COVID in 2020. Another perfusion stress was normal, as was an echo.    She presented in 2022 with worsening dyspnea. A perfusion stress was normal. An echo revealed normal LVSF and grade 2 diastolic dysfunction. I recommended that she start HCTZ.     The addition of the diuretic did not improve her shortness of breath.  She was then evaluated by pulmonary who started her on an inhaler, and this has helped.  She no longer has any shortness of breath.  She was admitted to [facility] few months ago with an episode of syncope.  She was standing but had not changed positions.  There was no prodrome other than the abrupt onset of a  terrible taste in her mouth and then she lost consciousness.  She had mild acute renal insufficiency and she was orthostatic. She had a normal 13-day rhythm monitor and normal echocardiogram.  HCTZ and lisinopril were stopped.      She checks her blood pressure at home and says her values are similar to what we obtained today.     Past Medical History:   Diagnosis Date    Arthritis     Gastroenteritis due to norovirus 05/10/2019    GERD (gastroesophageal reflux disease)     Hyperlipidemia     Hypertension     Migraine     Plantar fasciitis     Screening for malignant  neoplasm of colon 01/31/2022       Past Surgical History:   Procedure Laterality Date    CHOLECYSTECTOMY      COLONOSCOPY N/A 06/03/2022    Procedure: COLONOSCOPY;  Surgeon: Dmitry Dela Cruz MD;  [place] [facility] [place]  Service: Gastroenterology;  Laterality: N/A;  diverticulosis, Hemorrhoids    URETERAL STENT INSERTION         Social History     Socioeconomic History    Marital status:     Number of children: 1   Tobacco Use    Smoking status: Never    Smokeless tobacco: Never   Vaping Use    Vaping Use: Never used   Substance and Sexual Activity    Alcohol use: No    Drug use: No    Sexual activity: Defer       Family History   Problem Relation Age of Onset    Heart attack Mother 74    Valvular heart disease Father 60    Breast cancer Maternal Grandmother        Review of Systems   Constitutional: Positive for malaise/fatigue.   Cardiovascular:  Positive for dyspnea on exertion.   All other systems reviewed and are negative.      No Known Allergies      Current Outpatient Medications:     Acetaminophen 325 MG capsule, Take 1-2 capsules by mouth 2 (Two) Times a Day As Needed., Disp: , Rfl:     amLODIPine (NORVASC) 5 MG tablet, Take 1 tablet by mouth As Needed (as needed at night)., Disp: , Rfl:     clobetasol (TEMOVATE) 0.05 % cream, As Needed., Disp: , Rfl:     fluticasone (FLONASE) 50 MCG/ACT nasal spray, As Needed., Disp: , Rfl:     Magnesium 400 MG tablet, 400 mg Daily., Disp: , Rfl:     medroxyPROGESTERone (PROVERA) 2.5 MG tablet, TAKE 1 TABLET BY MOUTH ONCE DAILY, Disp: 90 tablet, Rfl: 1    montelukast (SINGULAIR) 10 MG tablet, Take 1 tablet by mouth Daily., Disp: , Rfl:     omeprazole (PriLOSEC) 40 MG capsule, 1 capsule Daily., Disp: , Rfl:     SUMAtriptan (IMITREX) 50 MG tablet, Take 1 tablet by mouth Every 2 (Two) Hours As Needed for Migraine. Take one tablet at onset of headache. May repeat dose one time in 2 hours if headache not relieved., Disp: , Rfl:     traMADol (ULTRAM) 50 MG  "tablet, Take 1 tablet by mouth As Needed., Disp: , Rfl:     Vitamin D, Cholecalciferol, 25 MCG (1000 UT) capsule, Take  by mouth Daily., Disp: , Rfl:     zinc gluconate 50 MG tablet, Take 1 tablet by mouth Daily., Disp: , Rfl:       Objective:     Vitals:    12/05/23 0914   BP: 146/86   BP Location: Left arm   Patient Position: Sitting   Cuff Size: Adult   Pulse: 84   SpO2: 98%   Weight: 71.7 kg (158 lb)   Height: 162.6 cm (64\")       Body mass index is 27.12 kg/m².    Vitals reviewed.   Constitutional:       Appearance: Well-developed and not in distress.   Eyes:      Conjunctiva/sclera: Conjunctivae normal.   HENT:      Head: Normocephalic.      Nose: Nose normal.   Neck:      Thyroid: Thyroid normal.      Vascular: No JVD. JVD normal.      Lymphadenopathy: No cervical adenopathy.   Pulmonary:      Effort: Pulmonary effort is normal.      Breath sounds: Normal breath sounds.   Cardiovascular:      Normal rate. Regular rhythm.      Murmurs: There is no murmur.   Pulses:     Intact distal pulses.   Edema:     Peripheral edema absent.   Abdominal:      Palpations: Abdomen is soft.      Tenderness: There is no abdominal tenderness.   Musculoskeletal: Normal range of motion.      Cervical back: Normal range of motion. Skin:     General: Skin is warm and dry.      Findings: No rash.   Neurological:      General: No focal deficit present.      Mental Status: Alert and oriented to person, place, and time.      Cranial Nerves: No cranial nerve deficit.   Psychiatric:         Behavior: Behavior normal.         Thought Content: Thought content normal.         Judgment: Judgment normal.           ECG 12 Lead    Date/Time: 12/5/2023 9:29 AM  Performed by: Heriberto Elizondo MD    Authorized by: Heriberto Elizondo MD  Comparison: compared with previous ECG   Similar to previous ECG  Rhythm: sinus rhythm  Conduction: conduction normal  ST Segments: ST segments normal  T Waves: T waves normal  QRS axis: normal  Other: no other " findings    Clinical impression: normal ECG            Assessment:       Diagnosis Plan   1. Primary hypertension  ECG 12 Lead      2. Shortness of breath  ECG 12 Lead      3. Syncope and collapse             Plan:       1.  Her blood pressure is suboptimally controlled.  She takes amlodipine 5 mg as needed but I asked her to cut it in half and take 2.5 mg daily.    2 this has resolved with inhaler therapy.  It did not improve with diuretic therapy.  She has had extensive cardiac testing that has been unremarkable..     3.  It is unclear what happened.  She was standing and did not have any lightheadedness but had an awful taste in her mouth before she lost consciousness. She was dehydrated and orthostatic.  She had an echocardiogram that was normal and showed normal diastolic function for age.  I actually went back and looked at the previous echo that we performed and I felt that it showed normal left atrial pressure as well.  If she has further episodes of syncope, we will likely need to place a loop recorder.      Sincerely,       Heriberto Elizondo MD

## 2023-12-05 ENCOUNTER — OFFICE VISIT (OUTPATIENT)
Dept: CARDIOLOGY | Facility: CLINIC | Age: 68
End: 2023-12-05
Payer: MEDICARE

## 2023-12-05 VITALS
HEIGHT: 64 IN | WEIGHT: 158 LBS | BODY MASS INDEX: 26.98 KG/M2 | OXYGEN SATURATION: 98 % | DIASTOLIC BLOOD PRESSURE: 86 MMHG | HEART RATE: 84 BPM | SYSTOLIC BLOOD PRESSURE: 146 MMHG

## 2023-12-05 DIAGNOSIS — R06.02 SHORTNESS OF BREATH: ICD-10-CM

## 2023-12-05 DIAGNOSIS — I10 PRIMARY HYPERTENSION: Primary | ICD-10-CM

## 2023-12-05 DIAGNOSIS — R55 SYNCOPE AND COLLAPSE: ICD-10-CM

## 2023-12-05 PROCEDURE — 1159F MED LIST DOCD IN RCRD: CPT | Performed by: INTERNAL MEDICINE

## 2023-12-05 PROCEDURE — 1160F RVW MEDS BY RX/DR IN RCRD: CPT | Performed by: INTERNAL MEDICINE

## 2023-12-05 PROCEDURE — 93000 ELECTROCARDIOGRAM COMPLETE: CPT | Performed by: INTERNAL MEDICINE

## 2023-12-05 PROCEDURE — 3079F DIAST BP 80-89 MM HG: CPT | Performed by: INTERNAL MEDICINE

## 2023-12-05 PROCEDURE — 99214 OFFICE O/P EST MOD 30 MIN: CPT | Performed by: INTERNAL MEDICINE

## 2023-12-05 PROCEDURE — 3077F SYST BP >= 140 MM HG: CPT | Performed by: INTERNAL MEDICINE

## 2023-12-05 RX ORDER — FAMOTIDINE 20 MG
TABLET ORAL DAILY
COMMUNITY

## 2023-12-05 RX ORDER — AMLODIPINE BESYLATE 5 MG/1
5 TABLET ORAL AS NEEDED
COMMUNITY

## 2023-12-05 RX ORDER — CALCIUM CARBONATE 300MG(750)
400 TABLET,CHEWABLE ORAL DAILY
COMMUNITY

## 2023-12-05 RX ORDER — TRAMADOL HYDROCHLORIDE 50 MG/1
50 TABLET ORAL AS NEEDED
COMMUNITY

## 2024-03-26 RX ORDER — MEDROXYPROGESTERONE ACETATE 2.5 MG/1
2.5 TABLET ORAL DAILY
Qty: 90 TABLET | Refills: 3 | Status: SHIPPED | OUTPATIENT
Start: 2024-03-26

## 2024-09-17 ENCOUNTER — OFFICE VISIT (OUTPATIENT)
Dept: CARDIOLOGY | Facility: CLINIC | Age: 69
End: 2024-09-17
Payer: MEDICARE

## 2024-09-17 VITALS
BODY MASS INDEX: 27.28 KG/M2 | OXYGEN SATURATION: 98 % | DIASTOLIC BLOOD PRESSURE: 74 MMHG | WEIGHT: 159.8 LBS | SYSTOLIC BLOOD PRESSURE: 116 MMHG | HEIGHT: 64 IN | HEART RATE: 73 BPM

## 2024-09-17 DIAGNOSIS — I10 PRIMARY HYPERTENSION: Primary | ICD-10-CM

## 2024-09-17 DIAGNOSIS — R60.0 BILATERAL LEG EDEMA: ICD-10-CM

## 2024-09-17 DIAGNOSIS — R06.02 SHORTNESS OF BREATH: ICD-10-CM

## 2024-09-17 DIAGNOSIS — R55 SYNCOPE AND COLLAPSE: ICD-10-CM

## 2024-09-17 PROCEDURE — 93000 ELECTROCARDIOGRAM COMPLETE: CPT | Performed by: PHYSICIAN ASSISTANT

## 2024-09-17 PROCEDURE — 3074F SYST BP LT 130 MM HG: CPT | Performed by: PHYSICIAN ASSISTANT

## 2024-09-17 PROCEDURE — 3078F DIAST BP <80 MM HG: CPT | Performed by: PHYSICIAN ASSISTANT

## 2024-09-17 PROCEDURE — 99214 OFFICE O/P EST MOD 30 MIN: CPT | Performed by: PHYSICIAN ASSISTANT

## 2024-09-17 RX ORDER — CETIRIZINE HYDROCHLORIDE 10 MG/1
10 TABLET ORAL DAILY
COMMUNITY

## 2024-09-17 RX ORDER — HYDROCHLOROTHIAZIDE 25 MG/1
25 TABLET ORAL DAILY
COMMUNITY

## 2024-09-20 ENCOUNTER — LAB (OUTPATIENT)
Dept: LAB | Facility: HOSPITAL | Age: 69
End: 2024-09-20
Payer: MEDICARE

## 2024-09-20 DIAGNOSIS — R06.02 SHORTNESS OF BREATH: ICD-10-CM

## 2024-09-20 DIAGNOSIS — R60.0 BILATERAL LEG EDEMA: ICD-10-CM

## 2024-09-20 DIAGNOSIS — I10 PRIMARY HYPERTENSION: ICD-10-CM

## 2024-09-20 DIAGNOSIS — R55 SYNCOPE AND COLLAPSE: ICD-10-CM

## 2024-09-20 LAB
ANION GAP SERPL CALCULATED.3IONS-SCNC: 17.2 MMOL/L (ref 5–15)
BUN SERPL-MCNC: 16 MG/DL (ref 8–23)
BUN/CREAT SERPL: 17 (ref 7–25)
CALCIUM SPEC-SCNC: 10.3 MG/DL (ref 8.6–10.5)
CHLORIDE SERPL-SCNC: 100 MMOL/L (ref 98–107)
CO2 SERPL-SCNC: 22.8 MMOL/L (ref 22–29)
CREAT SERPL-MCNC: 0.94 MG/DL (ref 0.57–1)
EGFRCR SERPLBLD CKD-EPI 2021: 65 ML/MIN/1.73
GLUCOSE SERPL-MCNC: 78 MG/DL (ref 65–99)
NT-PROBNP SERPL-MCNC: 77.6 PG/ML (ref 0–900)
POTASSIUM SERPL-SCNC: 2.8 MMOL/L (ref 3.5–5.2)
SODIUM SERPL-SCNC: 140 MMOL/L (ref 136–145)

## 2024-09-20 PROCEDURE — 36415 COLL VENOUS BLD VENIPUNCTURE: CPT

## 2024-09-20 PROCEDURE — 83880 ASSAY OF NATRIURETIC PEPTIDE: CPT

## 2024-09-20 PROCEDURE — 80048 BASIC METABOLIC PNL TOTAL CA: CPT

## 2024-09-23 ENCOUNTER — TELEPHONE (OUTPATIENT)
Dept: CARDIOLOGY | Facility: CLINIC | Age: 69
End: 2024-09-23
Payer: MEDICARE

## 2024-09-23 DIAGNOSIS — R55 SYNCOPE AND COLLAPSE: ICD-10-CM

## 2024-09-23 DIAGNOSIS — I10 PRIMARY HYPERTENSION: Primary | ICD-10-CM

## 2024-09-23 RX ORDER — POTASSIUM CHLORIDE 1500 MG/1
20 TABLET, EXTENDED RELEASE ORAL DAILY
Qty: 90 TABLET | Refills: 0 | Status: SHIPPED | OUTPATIENT
Start: 2024-09-23

## 2024-09-27 ENCOUNTER — HOSPITAL ENCOUNTER (OUTPATIENT)
Dept: CARDIOLOGY | Facility: HOSPITAL | Age: 69
Discharge: HOME | End: 2024-09-27
Payer: MEDICARE

## 2024-09-27 ENCOUNTER — LAB (OUTPATIENT)
Dept: LAB | Facility: HOSPITAL | Age: 69
End: 2024-09-27
Payer: MEDICARE

## 2024-09-27 VITALS
HEART RATE: 90 BPM | HEIGHT: 64 IN | BODY MASS INDEX: 27.1 KG/M2 | DIASTOLIC BLOOD PRESSURE: 74 MMHG | SYSTOLIC BLOOD PRESSURE: 168 MMHG | WEIGHT: 158.73 LBS

## 2024-09-27 DIAGNOSIS — R06.02 SHORTNESS OF BREATH: ICD-10-CM

## 2024-09-27 DIAGNOSIS — I10 PRIMARY HYPERTENSION: ICD-10-CM

## 2024-09-27 DIAGNOSIS — R55 SYNCOPE AND COLLAPSE: ICD-10-CM

## 2024-09-27 DIAGNOSIS — R60.0 BILATERAL LEG EDEMA: ICD-10-CM

## 2024-09-27 LAB
ANION GAP SERPL CALCULATED.3IONS-SCNC: 15 MMOL/L (ref 5–15)
AORTIC ARCH: 3.2 CM
AORTIC DIMENSIONLESS INDEX: 0.7 (DI)
ASCENDING AORTA: 2.6 CM
BH CV ECHO MEAS - ACS: 1.7 CM
BH CV ECHO MEAS - AO MAX PG: 8.8 MMHG
BH CV ECHO MEAS - AO MEAN PG: 5 MMHG
BH CV ECHO MEAS - AO ROOT AREA (BSA CORRECTED): 1.7 CM2
BH CV ECHO MEAS - AO ROOT DIAM: 2.9 CM
BH CV ECHO MEAS - AO V2 MAX: 148 CM/SEC
BH CV ECHO MEAS - AO V2 VTI: 27.5 CM
BH CV ECHO MEAS - AVA(I,D): 1.78 CM2
BH CV ECHO MEAS - EDV(CUBED): 36.7 ML
BH CV ECHO MEAS - EDV(MOD-SP2): 38 ML
BH CV ECHO MEAS - EDV(MOD-SP4): 46 ML
BH CV ECHO MEAS - EF(MOD-BP): 60.8 %
BH CV ECHO MEAS - EF(MOD-SP2): 65.8 %
BH CV ECHO MEAS - EF(MOD-SP4): 58.7 %
BH CV ECHO MEAS - ESV(CUBED): 8.8 ML
BH CV ECHO MEAS - ESV(MOD-SP2): 13 ML
BH CV ECHO MEAS - ESV(MOD-SP4): 19 ML
BH CV ECHO MEAS - FS: 38 %
BH CV ECHO MEAS - IVS/LVPW: 1.08 CM
BH CV ECHO MEAS - IVSD: 1.02 CM
BH CV ECHO MEAS - LAT PEAK E' VEL: 8.8 CM/SEC
BH CV ECHO MEAS - LV DIASTOLIC VOL/BSA (35-75): 26 CM2
BH CV ECHO MEAS - LV MASS(C)D: 92.8 GRAMS
BH CV ECHO MEAS - LV MAX PG: 4.9 MMHG
BH CV ECHO MEAS - LV MEAN PG: 3 MMHG
BH CV ECHO MEAS - LV SYSTOLIC VOL/BSA (12-30): 10.7 CM2
BH CV ECHO MEAS - LV V1 MAX: 111 CM/SEC
BH CV ECHO MEAS - LV V1 VTI: 19.4 CM
BH CV ECHO MEAS - LVIDD: 3.3 CM
BH CV ECHO MEAS - LVIDS: 2.06 CM
BH CV ECHO MEAS - LVOT AREA: 2.5 CM2
BH CV ECHO MEAS - LVOT DIAM: 1.79 CM
BH CV ECHO MEAS - LVPWD: 0.94 CM
BH CV ECHO MEAS - MED PEAK E' VEL: 7.6 CM/SEC
BH CV ECHO MEAS - MV A MAX VEL: 93.8 CM/SEC
BH CV ECHO MEAS - MV DEC SLOPE: 530.4 CM/SEC2
BH CV ECHO MEAS - MV DEC TIME: 0.2 SEC
BH CV ECHO MEAS - MV E MAX VEL: 79.2 CM/SEC
BH CV ECHO MEAS - MV E/A: 0.84
BH CV ECHO MEAS - MV MAX PG: 5.5 MMHG
BH CV ECHO MEAS - MV MEAN PG: 2.33 MMHG
BH CV ECHO MEAS - MV P1/2T: 62.2 MSEC
BH CV ECHO MEAS - MV V2 VTI: 27.2 CM
BH CV ECHO MEAS - MVA(P1/2T): 3.5 CM2
BH CV ECHO MEAS - MVA(VTI): 1.8 CM2
BH CV ECHO MEAS - PA ACC TIME: 0.12 SEC
BH CV ECHO MEAS - PA V2 MAX: 111 CM/SEC
BH CV ECHO MEAS - QP/QS: 1.23
BH CV ECHO MEAS - RAP SYSTOLE: 3 MMHG
BH CV ECHO MEAS - RV MAX PG: 1.97 MMHG
BH CV ECHO MEAS - RV V1 MAX: 70.2 CM/SEC
BH CV ECHO MEAS - RV V1 VTI: 12.4 CM
BH CV ECHO MEAS - RVOT DIAM: 2.48 CM
BH CV ECHO MEAS - RVSP: 23 MMHG
BH CV ECHO MEAS - SV(LVOT): 49 ML
BH CV ECHO MEAS - SV(MOD-SP2): 25 ML
BH CV ECHO MEAS - SV(MOD-SP4): 27 ML
BH CV ECHO MEAS - SV(RVOT): 60.1 ML
BH CV ECHO MEAS - SVI(LVOT): 27.7 ML/M2
BH CV ECHO MEAS - SVI(MOD-SP2): 14.1 ML/M2
BH CV ECHO MEAS - SVI(MOD-SP4): 15.3 ML/M2
BH CV ECHO MEAS - TR MAX PG: 20.4 MMHG
BH CV ECHO MEAS - TR MAX VEL: 225.9 CM/SEC
BH CV ECHO MEASUREMENTS AVERAGE E/E' RATIO: 9.66
BH CV XLRA - RV BASE: 2.7 CM
BH CV XLRA - RV LENGTH: 5.6 CM
BH CV XLRA - RV MID: 3.4 CM
BH CV XLRA - TDI S': 22.2 CM/SEC
BUN SERPL-MCNC: 11 MG/DL (ref 8–23)
BUN/CREAT SERPL: 12.6 (ref 7–25)
CALCIUM SPEC-SCNC: 9.6 MG/DL (ref 8.6–10.5)
CHLORIDE SERPL-SCNC: 106 MMOL/L (ref 98–107)
CO2 SERPL-SCNC: 21 MMOL/L (ref 22–29)
CREAT SERPL-MCNC: 0.87 MG/DL (ref 0.57–1)
EGFRCR SERPLBLD CKD-EPI 2021: 71.3 ML/MIN/1.73
GLUCOSE SERPL-MCNC: 95 MG/DL (ref 65–99)
LEFT ATRIUM VOLUME INDEX: 13.8 ML/M2
POTASSIUM SERPL-SCNC: 3.5 MMOL/L (ref 3.5–5.2)
SINUS: 2.6 CM
SODIUM SERPL-SCNC: 142 MMOL/L (ref 136–145)
STJ: 2.23 CM

## 2024-09-27 PROCEDURE — 80048 BASIC METABOLIC PNL TOTAL CA: CPT

## 2024-09-27 PROCEDURE — 93306 TTE W/DOPPLER COMPLETE: CPT

## 2024-09-27 PROCEDURE — 36415 COLL VENOUS BLD VENIPUNCTURE: CPT

## 2024-09-30 ENCOUNTER — TELEPHONE (OUTPATIENT)
Dept: CARDIOLOGY | Facility: CLINIC | Age: 69
End: 2024-09-30
Payer: MEDICARE

## 2024-09-30 NOTE — TELEPHONE ENCOUNTER
----- Message from Lachelle Monahan sent at 9/27/2024  4:05 PM EDT -----  Pls let patient know that echocardiogram looks good.  It is unchanged from a previous echocardiogram in 2022  ----- Message -----  From: Erick Pierre Jr., MD  Sent: 9/27/2024   3:24 PM EDT  To: Xiang Victor PA-C

## 2024-09-30 NOTE — TELEPHONE ENCOUNTER
Reviewed recommendations with patient, verbalized understanding, will call with any further questions or complaints.  Pt states that she will stop amlodipine as recommended, moniter BP, and call if it starts to go up.    Xiang- pt states that her PCP stopped her HCTZ on 9/23 (I updated her med list to reflect this), and started her on klorcon (unsure of dose, so this is not added to her list at this time).  She states that she see's her PCP again next week.    Mayra Murcia, OSBALDO  Triage Nurse  09/30/24 10:09 EDT

## 2024-09-30 NOTE — TELEPHONE ENCOUNTER
Results called to pt.  Instructed to call with any further questions or concerns.  Verbalized understanding.    Xiang- pt has received both echo and lab results, and states that she continues to have lower extremity swelling unchanged from when she saw you.  She states that she still takes the amlodipine (your note from 9/16 does mention possibly stopping this).  She is asking what she should do now?  She has a follow up in December with Dr. Elizondo, but states that she needs to do something about this swelling before then.    Mayra Murcia, RN  Triage Nurse, [facility] [place]  09/30/24 09:16 EDT

## 2024-10-07 ENCOUNTER — TELEPHONE (OUTPATIENT)
Dept: ONCOLOGY | Facility: CLINIC | Age: 69
End: 2024-10-07
Payer: MEDICARE

## 2024-10-07 NOTE — TELEPHONE ENCOUNTER
Caller: Christiane Smith    Relationship to patient: Self    Best call back number: [phone]    Chief complaint:     Type of visit: LAB & NEW PATIENT     Requested date: WOULD LIKE SOONER APPT NO WEDUESDAY, CAN DO [place] FOR THE FIRST VISIT. CALL TO R/S     If rescheduling, when is the original appointment: 10/22/2024    Additional notes:

## 2024-10-22 ENCOUNTER — CONSULT (OUTPATIENT)
Dept: ONCOLOGY | Facility: CLINIC | Age: 69
End: 2024-10-22
Payer: MEDICARE

## 2024-10-22 ENCOUNTER — LAB (OUTPATIENT)
Dept: LAB | Facility: HOSPITAL | Age: 69
End: 2024-10-22
Payer: MEDICARE

## 2024-10-22 VITALS
BODY MASS INDEX: 27.21 KG/M2 | HEIGHT: 64 IN | OXYGEN SATURATION: 100 % | HEART RATE: 91 BPM | WEIGHT: 159.4 LBS | SYSTOLIC BLOOD PRESSURE: 149 MMHG | TEMPERATURE: 97.4 F | DIASTOLIC BLOOD PRESSURE: 92 MMHG

## 2024-10-22 DIAGNOSIS — D64.9 ANEMIA, UNSPECIFIED TYPE: Primary | ICD-10-CM

## 2024-10-22 DIAGNOSIS — D64.9 ANEMIA, UNSPECIFIED TYPE: ICD-10-CM

## 2024-10-22 LAB
BASOPHILS # BLD AUTO: 0.06 10*3/MM3 (ref 0–0.2)
BASOPHILS NFR BLD AUTO: 1 % (ref 0–1.5)
DEPRECATED RDW RBC AUTO: 40.7 FL (ref 37–54)
EOSINOPHIL # BLD AUTO: 0.14 10*3/MM3 (ref 0–0.4)
EOSINOPHIL NFR BLD AUTO: 2.4 % (ref 0.3–6.2)
ERYTHROCYTE [DISTWIDTH] IN BLOOD BY AUTOMATED COUNT: 12.4 % (ref 12.3–15.4)
FERRITIN SERPL-MCNC: 73.7 NG/ML (ref 13–150)
HCT VFR BLD AUTO: 44.7 % (ref 34–46.6)
HGB BLD-MCNC: 14.5 G/DL (ref 12–15.9)
IMM GRANULOCYTES # BLD AUTO: 0.01 10*3/MM3 (ref 0–0.05)
IMM GRANULOCYTES NFR BLD AUTO: 0.2 % (ref 0–0.5)
IRON 24H UR-MRATE: 69 MCG/DL (ref 37–145)
IRON SATN MFR SERPL: 16 % (ref 20–50)
LYMPHOCYTES # BLD AUTO: 1.86 10*3/MM3 (ref 0.7–3.1)
LYMPHOCYTES NFR BLD AUTO: 32.1 % (ref 19.6–45.3)
MCH RBC QN AUTO: 29.1 PG (ref 26.6–33)
MCHC RBC AUTO-ENTMCNC: 32.4 G/DL (ref 31.5–35.7)
MCV RBC AUTO: 89.8 FL (ref 79–97)
MONOCYTES # BLD AUTO: 0.48 10*3/MM3 (ref 0.1–0.9)
MONOCYTES NFR BLD AUTO: 8.3 % (ref 5–12)
NEUTROPHILS NFR BLD AUTO: 3.25 10*3/MM3 (ref 1.7–7)
NEUTROPHILS NFR BLD AUTO: 56 % (ref 42.7–76)
PLATELET # BLD AUTO: 291 10*3/MM3 (ref 140–450)
PMV BLD AUTO: 9.8 FL (ref 6–12)
RBC # BLD AUTO: 4.98 10*6/MM3 (ref 3.77–5.28)
TIBC SERPL-MCNC: 438 MCG/DL (ref 298–536)
TRANSFERRIN SERPL-MCNC: 294 MG/DL (ref 200–360)
WBC NRBC COR # BLD AUTO: 5.8 10*3/MM3 (ref 3.4–10.8)

## 2024-10-22 PROCEDURE — 85025 COMPLETE CBC W/AUTO DIFF WBC: CPT | Performed by: INTERNAL MEDICINE

## 2024-10-22 PROCEDURE — 3077F SYST BP >= 140 MM HG: CPT | Performed by: INTERNAL MEDICINE

## 2024-10-22 PROCEDURE — 82728 ASSAY OF FERRITIN: CPT | Performed by: INTERNAL MEDICINE

## 2024-10-22 PROCEDURE — 99204 OFFICE O/P NEW MOD 45 MIN: CPT | Performed by: INTERNAL MEDICINE

## 2024-10-22 PROCEDURE — 84466 ASSAY OF TRANSFERRIN: CPT | Performed by: INTERNAL MEDICINE

## 2024-10-22 PROCEDURE — 83540 ASSAY OF IRON: CPT | Performed by: INTERNAL MEDICINE

## 2024-10-22 PROCEDURE — 36415 COLL VENOUS BLD VENIPUNCTURE: CPT

## 2024-10-22 PROCEDURE — 3080F DIAST BP >= 90 MM HG: CPT | Performed by: INTERNAL MEDICINE

## 2024-10-22 PROCEDURE — 1126F AMNT PAIN NOTED NONE PRSNT: CPT | Performed by: INTERNAL MEDICINE

## 2024-10-22 NOTE — PROGRESS NOTES
Subjective     REASON FOR CONSULTATION:  toe blistering  Provide an opinion on any further workup or treatment                             REQUESTING PHYSICIAN:  Duncan    RECORDS OBTAINED:  Records of the patients history including those obtained from the referring provider were reviewed and summarized in detail.    HISTORY OF PRESENT ILLNESS:  The patient is a 69 y.o. year old female who is here for an opinion about the above issue.    History of Present Illness   This is a 69-year-old lady referred her primary care provider for evaluation of toe blistering.  The patient does not have a known hematologic diagnosis.  She states that she has intermittent blistering on the toes usually the dorsal aspect which occur every few months and has happened for several years.  She has previously seen dermatology and had a skin biopsy with no diagnosis given.  She can did not have I exacerbating risk factors for the blistering.  After the blistering occurs the patient usually loses a toenail near the affected site.  Her toenails are chronically discolored.  She does not have rash or blistering any other location.  She denies any constitutional symptoms    Past Medical History:   Diagnosis Date    Arthritis     Gastroenteritis due to norovirus 05/10/2019    GERD (gastroesophageal reflux disease)     Hyperlipidemia     Hypertension     Kidney stones     Migraine     Plantar fasciitis     Screening for malignant neoplasm of colon 01/31/2022        Past Surgical History:   Procedure Laterality Date    ABLATION OF DYSRHYTHMIC FOCUS  [id number]    CHOLECYSTECTOMY      COLONOSCOPY N/A 06/03/2022    Procedure: COLONOSCOPY;  Surgeon: Dmitry Dela Cruz MD;  [place] [facility] [place];  Service: Gastroenterology;  Laterality: N/A;  diverticulosis, Hemorrhoids    CYSTOSCOPY      TOTAL HIP ARTHROPLASTY Left 03/14/2024    URETERAL STENT INSERTION          Current Outpatient Medications on File Prior to Visit   Medication Sig Dispense  Refill    cetirizine (zyrTEC) 10 MG tablet Take 1 tablet by mouth Daily.      fluticasone (FLONASE) 50 MCG/ACT nasal spray Administer 1 spray into the nostril(s) as directed by provider As Needed.      Magnesium 400 MG tablet 400 mg Daily.      medroxyPROGESTERone (PROVERA) 2.5 MG tablet Take 1 tablet by mouth Daily. 90 tablet 3    omeprazole (PriLOSEC) 40 MG capsule 1 capsule Daily.      potassium chloride (KLOR-CON M20) 20 MEQ CR tablet Take 1 tablet by mouth Daily. 90 tablet 0    Vitamin D, Cholecalciferol, 25 MCG (1000 UT) capsule Take  by mouth Daily.      zinc gluconate 50 MG tablet Take 1 tablet by mouth Daily.      Acetaminophen 325 MG capsule Take 1-2 capsules by mouth 2 (Two) Times a Day As Needed. (Patient not taking: Reported on 10/22/2024)      amLODIPine (NORVASC) 5 MG tablet Take 1 tablet by mouth As Needed (as needed at night). (Patient not taking: Reported on 10/22/2024)      clobetasol (TEMOVATE) 0.05 % cream As Needed. (Patient not taking: Reported on 10/22/2024)      montelukast (SINGULAIR) 10 MG tablet Take 1 tablet by mouth Daily. (Patient not taking: Reported on 10/22/2024)      SUMAtriptan (IMITREX) 50 MG tablet Take 1 tablet by mouth Every 2 (Two) Hours As Needed for Migraine. Take one tablet at onset of headache. May repeat dose one time in 2 hours if headache not relieved. (Patient not taking: Reported on 10/22/2024)      traMADol (ULTRAM) 50 MG tablet Take 1 tablet by mouth As Needed. (Patient not taking: Reported on 10/22/2024)       No current facility-administered medications on file prior to visit.        ALLERGIES:  No Known Allergies     Social History     Socioeconomic History    Marital status:     Number of children: 1   Tobacco Use    Smoking status: Never    Smokeless tobacco: Never   Vaping Use    Vaping status: Never Used   Substance and Sexual Activity    Alcohol use: No    Drug use: Never    Sexual activity: Defer        Family History   Problem Relation Age of Onset  "   Heart disease Mother     Heart attack Mother 74    Arthritis Father     Valvular heart disease Father 60    Hypertension Sister     Hyperlipidemia Sister     Cancer Maternal Aunt     Breast cancer Maternal Grandmother         Review of Systems   Constitutional: Negative.    HENT: Negative.     Respiratory: Negative.     Cardiovascular: Negative.    Gastrointestinal: Negative.    Genitourinary: Negative.    Musculoskeletal: Negative.    Skin:         See the HPI   Neurological: Negative.    Psychiatric/Behavioral: Negative.            Objective     Vitals:    10/22/24 1316   BP: 149/92   Pulse: 91   Temp: 97.4 °F (36.3 °C)   TempSrc: Oral   SpO2: 100%   Weight: 72.3 kg (159 lb 6.4 oz)   Height: 162 cm (63.78\")   PainSc: 0-No pain          No data to display                Physical Exam    CONSTITUTIONAL: pleasant well-developed adult woman  HEENT: no icterus, no thrush, moist membranes  MUSC: no edema, normal gait  NEURO: alert and oriented x3, normal strength  PSYCH: normal mood  Skin: She has what appears to be fungal infection affecting the toenails, some reddened skin in between the toes but no active blistering, no other skin rash except some wounds from recent dermatology resection/punch biopsies    RECENT LABS:  Hematology WBC   Date Value Ref Range Status   03/05/2024 10.66 4.5 - 11.0 10*3/uL Final     RBC   Date Value Ref Range Status   03/05/2024 4.84 4.0 - 5.2 10*6/uL Final     Hemoglobin   Date Value Ref Range Status   03/05/2024 14.1 12.0 - 16.0 g/dL Final     Hematocrit   Date Value Ref Range Status   03/05/2024 42.2 36.0 - 46.0 % Final     Platelets   Date Value Ref Range Status   03/05/2024 289 140 - 440 10*3/uL Final          Assessment & Plan     This is a 69-year-old lady with blistering lesions intermittently on the dorsal aspect of the toes.  From pictures she showed me and given the context of fungal infection of the nails I suspect this is bullous tinea pedis.  I am going to check a CBC " ferritin iron profile but porphyria cutanea tarda does not typically affect the toes as it does hands and she has never had blistering on the knuckles of the hands.  I recommended she see podiatry for valuation and treatment of tinea.

## 2024-10-23 ENCOUNTER — TELEPHONE (OUTPATIENT)
Dept: ONCOLOGY | Facility: CLINIC | Age: 69
End: 2024-10-23
Payer: MEDICARE

## 2024-10-23 ENCOUNTER — PATIENT ROUNDING (BHMG ONLY) (OUTPATIENT)
Dept: ONCOLOGY | Facility: CLINIC | Age: 69
End: 2024-10-23
Payer: MEDICARE

## 2024-10-23 RX ORDER — FERROUS SULFATE 325(65) MG
325 TABLET ORAL
Qty: 30 TABLET | Refills: 3 | Status: SHIPPED | OUTPATIENT
Start: 2024-10-23

## 2024-10-23 NOTE — TELEPHONE ENCOUNTER
Patient verbalized understanding of Dr. Lowery's message. Educated on benefits of taking iron with food and s/s of intolerance. She says she believes it has been less than 5 years since her last colonoscopy and that they told her she should have the next one at 10 year roberto.

## 2024-10-23 NOTE — TELEPHONE ENCOUNTER
----- Message from Juma Lowery sent at 10/23/2024  7:49 AM EDT -----  PIP iron levels little on low side would take ferrous sulfate 325 daily or every other day for couple months to replace.  Has she had colonoscopy?

## 2024-11-19 ENCOUNTER — PROCEDURE VISIT (OUTPATIENT)
Dept: OBSTETRICS AND GYNECOLOGY | Facility: CLINIC | Age: 69
End: 2024-11-19
Payer: MEDICARE

## 2024-11-19 ENCOUNTER — OFFICE VISIT (OUTPATIENT)
Dept: OBSTETRICS AND GYNECOLOGY | Facility: CLINIC | Age: 69
End: 2024-11-19
Payer: MEDICARE

## 2024-11-19 VITALS
WEIGHT: 160.2 LBS | DIASTOLIC BLOOD PRESSURE: 94 MMHG | HEIGHT: 64 IN | HEART RATE: 78 BPM | BODY MASS INDEX: 27.35 KG/M2 | SYSTOLIC BLOOD PRESSURE: 190 MMHG

## 2024-11-19 DIAGNOSIS — Z01.419 ENCOUNTER FOR ROUTINE GYNECOLOGIC EXAMINATION IN MEDICARE PATIENT: ICD-10-CM

## 2024-11-19 DIAGNOSIS — Z01.419 ENCOUNTER FOR GYNECOLOGICAL EXAMINATION WITHOUT ABNORMAL FINDING: Primary | ICD-10-CM

## 2024-11-19 DIAGNOSIS — Z12.31 VISIT FOR SCREENING MAMMOGRAM: Primary | ICD-10-CM

## 2024-11-19 NOTE — PROGRESS NOTES
CC- Here for annual exam.   (Prev penta patient here for her annual today. Mammo today, pap 10/2022 neg, dexa 2022, colon 2022)     Christiane Smith is a 69 y.o. female who presents for annual well woman exam. She is menopausal.  She is experiencing and/or reports no bothersome menopause symptoms.  She denies postmenopausal vaginal bleeding.  She denies abdominal pain.  Today, she wishes to specifically address just routine screening exam.  I explained to her that current ACOG guidelines state that screening Pap smears are no longer recommended after the age of 65, nor after hysterectomy for benign reasons.    OB History          1    Para   1    Term   1            AB        Living             SAB        IAB        Ectopic        Molar        Multiple        Live Births                    Current contraception: post menopausal status  History of abnormal Pap smear: no  Family history of uterine, colon or ovarian cancer: no  History of abnormal mammogram: no  Family history of breast cancer: no  Last Pap :   Last mammogram: Done today  Last colonoscopy:   Last DEXA: , with osteopenia      Past Medical History:   Diagnosis Date    Arthritis     Gastroenteritis due to norovirus 05/10/2019    GERD (gastroesophageal reflux disease)     Hyperlipidemia     Hypertension     Kidney stones     Migraine     Plantar fasciitis     Screening for malignant neoplasm of colon 2022       Past Surgical History:   Procedure Laterality Date    ABLATION OF DYSRHYTHMIC FOCUS  [id number]    CHOLECYSTECTOMY      COLONOSCOPY N/A 2022    Procedure: COLONOSCOPY;  Surgeon: Dmitry Dela Cruz MD;  Location: [facility] MAIN OR;  Service: Gastroenterology;  Laterality: N/A;  diverticulosis, Hemorrhoids    CYSTOSCOPY      TOTAL HIP ARTHROPLASTY Left 2024    URETERAL STENT INSERTION           Current Outpatient Medications:     cetirizine (zyrTEC) 10 MG tablet, Take 1 tablet by mouth Daily.,  Disp: , Rfl:     ferrous sulfate 325 (65 FE) MG tablet, Take 1 tablet by mouth Daily With Breakfast. May take every other day, if you cannot tolerate daily dose., Disp: 30 tablet, Rfl: 3    fluticasone (FLONASE) 50 MCG/ACT nasal spray, Administer 1 spray into the nostril(s) as directed by provider As Needed., Disp: , Rfl:     Magnesium 400 MG tablet, 400 mg Daily., Disp: , Rfl:     medroxyPROGESTERone (PROVERA) 2.5 MG tablet, Take 1 tablet by mouth Daily., Disp: 90 tablet, Rfl: 3    omeprazole (PriLOSEC) 40 MG capsule, 1 capsule Daily., Disp: , Rfl:     potassium chloride (KLOR-CON M20) 20 MEQ CR tablet, Take 1 tablet by mouth Daily., Disp: 90 tablet, Rfl: 0    Vitamin D, Cholecalciferol, 25 MCG (1000 UT) capsule, Take  by mouth Daily., Disp: , Rfl:     zinc gluconate 50 MG tablet, Take 1 tablet by mouth Daily., Disp: , Rfl:     Acetaminophen 325 MG capsule, Take 1-2 capsules by mouth 2 (Two) Times a Day As Needed. (Patient not taking: Reported on 11/19/2024), Disp: , Rfl:     amLODIPine (NORVASC) 5 MG tablet, Take 1 tablet by mouth As Needed (as needed at night). (Patient not taking: Reported on 11/19/2024), Disp: , Rfl:     clobetasol (TEMOVATE) 0.05 % cream, As Needed. (Patient not taking: Reported on 9/17/2024), Disp: , Rfl:     montelukast (SINGULAIR) 10 MG tablet, Take 1 tablet by mouth Daily. (Patient not taking: Reported on 9/17/2024), Disp: , Rfl:     SUMAtriptan (IMITREX) 50 MG tablet, Take 1 tablet by mouth Every 2 (Two) Hours As Needed for Migraine. Take one tablet at onset of headache. May repeat dose one time in 2 hours if headache not relieved. (Patient not taking: Reported on 9/17/2024), Disp: , Rfl:     traMADol (ULTRAM) 50 MG tablet, Take 1 tablet by mouth As Needed. (Patient not taking: Reported on 11/19/2024), Disp: , Rfl:     No Known Allergies    Social History     Tobacco Use    Smoking status: Never    Smokeless tobacco: Never   Vaping Use    Vaping status: Never Used   Substance Use Topics  "   Alcohol use: No    Drug use: Never       Family History   Problem Relation Age of Onset    Heart disease Mother     Heart attack Mother 74    Arthritis Father     Valvular heart disease Father 60    Hypertension Sister     Hyperlipidemia Sister     Cancer Maternal Aunt     Breast cancer Maternal Grandmother        Review of Systems   Constitutional:  Negative for diaphoresis and fatigue.   Genitourinary:  Negative for pelvic pain and vaginal bleeding.   Patient reports that she is not currently experiencing any symptoms of urinary incontinence.      BP (!) 190/94   Pulse 78   Ht 162 cm (63.78\")   Wt 72.7 kg (160 lb 3.2 oz)   BMI 27.69 kg/m²     Physical Exam  Constitutional:       Appearance: She is normal weight.   Genitourinary:      Bladder and urethral meatus normal.      No lesions in the vagina.      Right Labia: No lesions.     Left Labia: No lesions.     No vaginal discharge, tenderness or bleeding.      No vaginal prolapse present.     No vaginal atrophy present.       Right Adnexa: not tender, not full and no mass present.     Left Adnexa: not tender, not full and no mass present.     No cervical motion tenderness, discharge, friability or lesion.      Uterus is not enlarged, fixed or tender.      No uterine mass detected.     Uterus is midaxial.   Breasts:     Right: No mass, nipple discharge, skin change or tenderness.      Left: No mass, nipple discharge, skin change or tenderness.   Abdominal:      General: Abdomen is flat.      Palpations: Abdomen is soft. There is no mass.      Tenderness: There is no abdominal tenderness.   Neurological:      Mental Status: She is alert.   Vitals reviewed.             Assessment     1) GYN annual well woman exam.   2) normal menopausal exam.  3) osteopenia.  Will recommend repeat DEXA study in 1 year.     Plan     1) Breast Health - Clinical breast exam & mammogram reviewed specifically American Cancer Society recommendations for screening specific to her, " and Self breast awareness monthly  2) Pap -done today  3) Smoking status-negative  4) Colon health - screening colonoscopy recommended if not up to date  5) Bone health - Weight bearing exercise, dietary calcium recommendations and vitamin D reviewed.   6) Encouraged to be wary of information obtained via social media and internet based on source and search.   7) Follow up prn and one year      Aris Beard MD   11/19/2024  11:20 EST

## 2024-11-26 LAB
CYTOLOGIST CVX/VAG CYTO: NORMAL
CYTOLOGY CVX/VAG DOC CYTO: NORMAL
CYTOLOGY CVX/VAG DOC THIN PREP: NORMAL
DX ICD CODE: NORMAL
HPV GENOTYPE REFLEX: NORMAL
HPV I/H RISK 4 DNA CVX QL PROBE+SIG AMP: NEGATIVE
Lab: NORMAL
OTHER STN SPEC: NORMAL
STAT OF ADQ CVX/VAG CYTO-IMP: NORMAL

## 2024-12-11 NOTE — PROGRESS NOTES
RM:________     PCP: Samantha Song PA    : 1955  AGE: 69 y.o.  EST PATIENT   REASON FOR VISIT/  CC:    Wt Readings from Last 3 Encounters:   24 72.7 kg (160 lb 3.2 oz)   10/22/24 72.3 kg (159 lb 6.4 oz)   24 72 kg (158 lb 11.7 oz)      BP Readings from Last 3 Encounters:   24 (!) 190/94   10/22/24 149/92   24 168/74      WT: ____________ BP: __________L __________R HR______    ALLERGIES:Patient has no known allergies. SMOKING HISTORY:  Social History     Tobacco Use    Smoking status: Never    Smokeless tobacco: Never   Vaping Use    Vaping status: Never Used   Substance Use Topics    Alcohol use: No    Drug use: Never     CAFFEINE USE_________________  ALCOHOL ______________________    Below is the patient's most recent value for Albumin, ALT, AST, BUN, Calcium, Chloride, Cholesterol, CO2, Creatinine, GFR, Glucose, HDL, Hematocrit, Hemoglobin, Hemoglobin A1C, LDL, Magnesium, Phosphorus, Platelets, Potassium, PSA, Sodium, Triglycerides, TSH and WBC.   Lab Results   Component Value Date    ALBUMIN 3.6 2023    ALT 18 2022    AST 18 2022    BUN 11 2024    CALCIUM 9.6 2024     2024    CHOL 129 2019    CO2 21.0 (L) 2024    CREATININE 0.87 2024    GLU 79 2018    HDL 57 2024    HCT 44.7 10/22/2024    HGB 14.5 10/22/2024    HGBA1C 5.00 2019     (H) 2024    MG 2.0 2023     10/22/2024    K 3.5 2024     2024    TRIG 95 2024    TSH 1.490 2019    WBC 5.80 10/22/2024          NEW DIAGNOSIS/ SURGERY/ HOSP OR ED VISITS: ______________________    __________________________________________________________________      RECENT LABS OR DIAGNOSTIC TESTING:  _____________________________    __________________________________________________________________      ASSESSMENT/ PLAN:  _______________________________________________    __________________________________________________________________

## 2024-12-17 ENCOUNTER — OFFICE VISIT (OUTPATIENT)
Dept: CARDIOLOGY | Facility: CLINIC | Age: 69
End: 2024-12-17
Payer: MEDICARE

## 2024-12-17 VITALS
OXYGEN SATURATION: 98 % | SYSTOLIC BLOOD PRESSURE: 144 MMHG | BODY MASS INDEX: 27.52 KG/M2 | WEIGHT: 161.2 LBS | DIASTOLIC BLOOD PRESSURE: 92 MMHG | HEIGHT: 64 IN | HEART RATE: 87 BPM

## 2024-12-17 DIAGNOSIS — I10 PRIMARY HYPERTENSION: Primary | ICD-10-CM

## 2024-12-17 PROCEDURE — 1159F MED LIST DOCD IN RCRD: CPT | Performed by: INTERNAL MEDICINE

## 2024-12-17 PROCEDURE — 3077F SYST BP >= 140 MM HG: CPT | Performed by: INTERNAL MEDICINE

## 2024-12-17 PROCEDURE — 3080F DIAST BP >= 90 MM HG: CPT | Performed by: INTERNAL MEDICINE

## 2024-12-17 PROCEDURE — 99213 OFFICE O/P EST LOW 20 MIN: CPT | Performed by: INTERNAL MEDICINE

## 2024-12-17 PROCEDURE — 1160F RVW MEDS BY RX/DR IN RCRD: CPT | Performed by: INTERNAL MEDICINE

## 2024-12-17 RX ORDER — FLUTICASONE FUROATE AND VILANTEROL TRIFENATATE 100; 25 UG/1; UG/1
1 POWDER RESPIRATORY (INHALATION) DAILY
COMMUNITY
Start: 2024-11-18

## 2024-12-17 NOTE — PROGRESS NOTES
Date of Office Visit: 24  Encounter Provider: Heriberto Elizondo MD  Place of Service: [facility] CARDIOLOGY  Patient Name: Christiane Smith  :1955    Chief Complaint   Patient presents with    Follow-up   :     HPI:     Ms. Smith is 69 y.o. and presents today in follow up. I have reviewed prior notes and there are no changes except for any new updates described below. I have also reviewed any information entered into the medical record by the patient or by ancillary staff.     She presented in  with shortness of breath. An echo and treadmill stress test were normal. She presented again in 2021 with dyspnea and atypical chest pain after having had COVID in 2020. Another perfusion stress was normal, as was an echo.    She presented in 2022 with worsening dyspnea. A perfusion stress was normal. An echo revealed normal LVSF and grade 2 diastolic dysfunction. I recommended that she start HCTZ.     The addition of the diuretic did not improve her shortness of breath.  She was then evaluated by pulmonary who started her on an inhaler, and this helped.  She no longer has any shortness of breath.    She was admitted to [facility] in  with an episode of syncope.  She was standing but had not changed positions.  There was no prodrome other than the abrupt onset of a  terrible taste in her mouth and then she lost consciousness.  She had mild acute renal insufficiency and she was orthostatic. She had a normal 13-day rhythm monitor and normal echocardiogram.  HCTZ and lisinopril were stopped.      At one point, she was put back on HCTZ, and amlodipine was added. This caused leg edema and hypokalemia. She has stopped all medications, and when she checks her BP at home, it's 120s/80s.     She feels well and has no worrisome cardiac issues.She had a completely normal echo in 2023.    Past Medical History:   Diagnosis Date    Arthritis      Gastroenteritis due to norovirus 05/10/2019    GERD (gastroesophageal reflux disease)     Hyperlipidemia     Hypertension     Kidney stones     Migraine     Plantar fasciitis     Screening for malignant neoplasm of colon 01/31/2022       Past Surgical History:   Procedure Laterality Date    ABLATION OF DYSRHYTHMIC FOCUS  [id number]    CHOLECYSTECTOMY      COLONOSCOPY N/A 06/03/2022    Procedure: COLONOSCOPY;  Surgeon: Dmitry Dela Cruz MD;  Location: [facility] MAIN OR;  Service: Gastroenterology;  Laterality: N/A;  diverticulosis, Hemorrhoids    CYSTOSCOPY      SKIN CANCER EXCISION  10/17/2024    nose    TOTAL HIP ARTHROPLASTY Left 03/14/2024    URETERAL STENT INSERTION         Social History     Socioeconomic History    Marital status:     Number of children: 1   Tobacco Use    Smoking status: Never    Smokeless tobacco: Never   Vaping Use    Vaping status: Never Used   Substance and Sexual Activity    Alcohol use: No    Drug use: Never    Sexual activity: Defer       Family History   Problem Relation Age of Onset    Heart disease Mother     Heart attack Mother 74    Arthritis Father     Valvular heart disease Father 60    Hypertension Sister     Hyperlipidemia Sister     Cancer Maternal Aunt     Breast cancer Maternal Grandmother        Review of Systems   Constitutional: Positive for malaise/fatigue.   Cardiovascular:  Positive for dyspnea on exertion.   All other systems reviewed and are negative.      No Known Allergies      Current Outpatient Medications:     Acetaminophen 325 MG capsule, Take 1-2 capsules by mouth 2 (Two) Times a Day As Needed., Disp: , Rfl:     Breo Ellipta 100-25 MCG/ACT aerosol powder , Inhale 1 puff Daily., Disp: , Rfl:     cetirizine (zyrTEC) 10 MG tablet, Take 1 tablet by mouth Daily., Disp: , Rfl:     ferrous sulfate 325 (65 FE) MG tablet, Take 1 tablet by mouth Daily With Breakfast. May take every other day, if you cannot tolerate daily dose., Disp: 30 tablet, Rfl: 3     "fluticasone (FLONASE) 50 MCG/ACT nasal spray, Administer 1 spray into the nostril(s) as directed by provider As Needed., Disp: , Rfl:     Magnesium 400 MG tablet, 400 mg Daily., Disp: , Rfl:     medroxyPROGESTERone (PROVERA) 2.5 MG tablet, Take 1 tablet by mouth Daily., Disp: 90 tablet, Rfl: 3    omeprazole (PriLOSEC) 40 MG capsule, 1 capsule Daily., Disp: , Rfl:     potassium chloride (KLOR-CON M20) 20 MEQ CR tablet, Take 1 tablet by mouth Daily., Disp: 90 tablet, Rfl: 0    traMADol (ULTRAM) 50 MG tablet, Take 1 tablet by mouth As Needed., Disp: , Rfl:     Vitamin D, Cholecalciferol, 25 MCG (1000 UT) capsule, Take  by mouth Daily., Disp: , Rfl:     zinc gluconate 50 MG tablet, Take 1 tablet by mouth Daily., Disp: , Rfl:     amLODIPine (NORVASC) 5 MG tablet, Take 1 tablet by mouth As Needed (as needed at night). (Patient not taking: Reported on 10/22/2024), Disp: , Rfl:     clobetasol (TEMOVATE) 0.05 % cream, As Needed. (Patient not taking: Reported on 12/17/2024), Disp: , Rfl:     montelukast (SINGULAIR) 10 MG tablet, Take 1 tablet by mouth Daily. (Patient not taking: Reported on 12/17/2024), Disp: , Rfl:     SUMAtriptan (IMITREX) 50 MG tablet, Take 1 tablet by mouth Every 2 (Two) Hours As Needed for Migraine. Take one tablet at onset of headache. May repeat dose one time in 2 hours if headache not relieved. (Patient not taking: Reported on 12/17/2024), Disp: , Rfl:       Objective:     Vitals:    12/17/24 1457   BP: 144/92   Pulse: 87   SpO2: 98%   Weight: 73.1 kg (161 lb 3.2 oz)   Height: 162.6 cm (64\")       Body mass index is 27.67 kg/m².    Vitals reviewed.   Constitutional:       Appearance: Well-developed and not in distress.   Eyes:      Conjunctiva/sclera: Conjunctivae normal.   HENT:      Head: Normocephalic.      Nose: Nose normal.   Neck:      Thyroid: Thyroid normal.      Vascular: No JVD. JVD normal.      Lymphadenopathy: No cervical adenopathy.   Pulmonary:      Effort: Pulmonary effort is normal.    "   Breath sounds: Normal breath sounds.   Cardiovascular:      Normal rate. Regular rhythm.      Murmurs: There is no murmur.   Pulses:     Intact distal pulses.   Edema:     Peripheral edema absent.   Abdominal:      Palpations: Abdomen is soft.      Tenderness: There is no abdominal tenderness.   Musculoskeletal: Normal range of motion. Skin:     General: Skin is warm and dry.   Neurological:      Mental Status: Alert and oriented to person, place, and time.      Cranial Nerves: No cranial nerve deficit.   Psychiatric:         Behavior: Behavior normal.         Thought Content: Thought content normal.         Judgment: Judgment normal.         Procedures      Assessment:       Diagnosis Plan   1. Primary hypertension             Plan:       1.  Her blood pressure is a bit high today but it's well controlled at home. She didn't tolerate amlodipine due to edema, and HCTZ caused hypokalemia. Now that she's off HCTZ, she can stop potassium, as well.     If I were to need an agent in the future, I could try an ARB vs spironolactone.     Sincerely,       Heriberto Elizondo MD

## 2025-01-17 ENCOUNTER — TRANSCRIBE ORDERS (OUTPATIENT)
Dept: ADMINISTRATIVE | Facility: HOSPITAL | Age: 70
End: 2025-01-17
Payer: MEDICARE

## 2025-01-17 DIAGNOSIS — Z78.0 MENOPAUSE PRESENT: Primary | ICD-10-CM

## 2025-01-20 RX ORDER — FERROUS SULFATE 325(65) MG
325 TABLET ORAL
Qty: 90 TABLET | Refills: 1 | Status: SHIPPED | OUTPATIENT
Start: 2025-01-20

## 2025-01-28 ENCOUNTER — APPOINTMENT (OUTPATIENT)
Dept: BONE DENSITY | Facility: HOSPITAL | Age: 70
End: 2025-01-28
Payer: MEDICARE

## 2025-01-28 DIAGNOSIS — Z78.0 MENOPAUSE PRESENT: ICD-10-CM

## 2025-01-28 PROCEDURE — 77080 DXA BONE DENSITY AXIAL: CPT

## 2025-04-07 RX ORDER — MEDROXYPROGESTERONE ACETATE 2.5 MG/1
2.5 TABLET ORAL DAILY
Qty: 90 TABLET | Refills: 2 | Status: SHIPPED | OUTPATIENT
Start: 2025-04-07

## 2025-04-07 NOTE — TELEPHONE ENCOUNTER
Last seen 11/19/24 former myles pt  Requesting provera refill please.  Pharmacy -   [id number] - [place], [place] - [address] [phone] [facility] [phone]

## 2025-05-12 ENCOUNTER — APPOINTMENT (OUTPATIENT)
Dept: GENERAL RADIOLOGY | Facility: HOSPITAL | Age: 70
End: 2025-05-12
Payer: MEDICARE

## 2025-05-12 ENCOUNTER — HOSPITAL ENCOUNTER (EMERGENCY)
Facility: HOSPITAL | Age: 70
Discharge: HOME | End: 2025-05-12
Payer: MEDICARE

## 2025-05-12 VITALS
DIASTOLIC BLOOD PRESSURE: 96 MMHG | OXYGEN SATURATION: 98 % | TEMPERATURE: 98 F | HEIGHT: 62 IN | SYSTOLIC BLOOD PRESSURE: 205 MMHG | BODY MASS INDEX: 30.91 KG/M2 | RESPIRATION RATE: 18 BRPM | WEIGHT: 168 LBS | HEART RATE: 82 BPM

## 2025-05-12 DIAGNOSIS — S42.292A CLOSED FRACTURE OF HEAD OF LEFT HUMERUS, INITIAL ENCOUNTER: Primary | ICD-10-CM

## 2025-05-12 PROCEDURE — 73030 X-RAY EXAM OF SHOULDER: CPT

## 2025-05-12 PROCEDURE — 73060 X-RAY EXAM OF HUMERUS: CPT

## 2025-05-12 PROCEDURE — 73070 X-RAY EXAM OF ELBOW: CPT

## 2025-05-12 PROCEDURE — 99283 EMERGENCY DEPT VISIT LOW MDM: CPT

## 2025-05-12 RX ORDER — HYDROCODONE BITARTRATE AND ACETAMINOPHEN 5; 325 MG/1; MG/1
1 TABLET ORAL ONCE
Refills: 0 | Status: COMPLETED | OUTPATIENT
Start: 2025-05-12 | End: 2025-05-12

## 2025-05-12 RX ADMIN — HYDROCODONE BITARTRATE AND ACETAMINOPHEN 1 TABLET: 5; 325 TABLET ORAL at 23:24

## 2025-05-13 NOTE — DISCHARGE INSTRUCTIONS
You were seen in the emergency department today for shoulder pain. Vital signs + a medical screening exam were performed, and the need for any labwork and/ or imaging were discussed. Results of the above, if performed, were also discussed as well as their clinical significance.     You should schedule a follow-up appointment with your family medicine doctor within the next 2-3 days. If you do not have a family medicine provider we can provide you with contact information to establish care.    You were diagnosed with a fracture of the left humerus, which is the upper arm, near the shoulder joint. Keep shoulder sling in place until cleared by orthopedic surgery. You can either call your own orthopedic surgeon or the provided orthopedic surgeon I have included in your discharge instructions.     Any prescriptions written today can have a paper copy provided for you to take to any pharmacy you would like in the event that your primary pharmacy is closed.  For any medications that were prescribed as a daily medication and for which you received a dose in the emergency department such as antibiotics, unless otherwise instructed take your 1st dose tomorrow. If you would prefer a paper copy of your prescription, let your treating physician or nurse know.     It is important to remember that symptoms and progression of illness/ injury can change, so do not hesitate to return to the Emergency Department for any new or worsening symptoms.     You should return to the Emergency Department or seek immediate medical care if you develop new or worsening symptoms including but not limited to numbness in your fingers, uncontrolled pain, swelling in the lower arm, inability to move your fingers.

## 2025-05-13 NOTE — ED PROVIDER NOTES
Subjective   History of Present Illness    69-year-old female with history of hypertension, GERD, presenting to the emergency department for evaluation of arm pain.  Patient reports that she was playing pickle ball earlier in the day when she tripped, falling onto her left shoulder, left knee.  She has small abrasion to the left knee but has been ambulatory and has no significant pain there.  Has been having pain at the left shoulder, no numbness tingling in the arm or fingers, no chest pain, no neck pain, no back pain.    Review of Systems    ROS as specified in HPI.      Past Medical History:   Diagnosis Date    Arthritis     Gastroenteritis due to norovirus 05/10/2019    GERD (gastroesophageal reflux disease)     Hyperlipidemia     Hypertension     Kidney stones     Migraine     Plantar fasciitis     Screening for malignant neoplasm of colon 01/31/2022       No Known Allergies    Past Surgical History:   Procedure Laterality Date    ABLATION OF DYSRHYTHMIC FOCUS  [id number]    CHOLECYSTECTOMY      COLONOSCOPY N/A 06/03/2022    Procedure: COLONOSCOPY;  Surgeon: Dmitry Dela Cruz MD;  Location: [facility] [place] [facility] MAIN OR;  Service: Gastroenterology;  Laterality: N/A;  diverticulosis, Hemorrhoids    CYSTOSCOPY      SKIN CANCER EXCISION  10/17/2024    nose    TOTAL HIP ARTHROPLASTY Left 03/14/2024    URETERAL STENT INSERTION         Family History   Problem Relation Age of Onset    Heart disease Mother     Heart attack Mother 74    Arthritis Father     Valvular heart disease Father 60    Hypertension Sister     Hyperlipidemia Sister     Cancer Maternal Aunt     Breast cancer Maternal Grandmother        Social History     Socioeconomic History    Marital status:     Number of children: 1   Tobacco Use    Smoking status: Never    Smokeless tobacco: Never   Vaping Use    Vaping status: Never Used   Substance and Sexual Activity    Alcohol use: No    Drug use: Never    Sexual activity: Defer           Objective    Physical Exam  Vitals reviewed.   Constitutional:       General: She is not in acute distress.     Appearance: She is normal weight. She is not toxic-appearing.   HENT:      Head: Normocephalic and atraumatic.      Nose: Nose normal. No congestion.      Mouth/Throat:      Mouth: Mucous membranes are moist.      Pharynx: Oropharynx is clear.   Eyes:      General: No scleral icterus.     Conjunctiva/sclera: Conjunctivae normal.   Cardiovascular:      Pulses: Normal pulses.   Pulmonary:      Effort: Pulmonary effort is normal. No respiratory distress.   Abdominal:      General: There is no distension.      Palpations: Abdomen is soft.      Tenderness: There is no abdominal tenderness.   Musculoskeletal:      Cervical back: Normal range of motion and neck supple. No rigidity.      Comments: Normal range of motion bilateral lower extremities as well as the right upper extremity.  Limited range of motion of the left shoulder due to pain, no gross deformities.  Normal pulses bilateral upper extremities.  Small abrasion to the left anterior knee, no tenderness or knee laxity.   Skin:     General: Skin is warm and dry.      Capillary Refill: Capillary refill takes less than 2 seconds.      Coloration: Skin is not jaundiced.   Neurological:      Mental Status: She is alert and oriented to person, place, and time. Mental status is at baseline.      Sensory: No sensory deficit.      Motor: No weakness.   Psychiatric:         Mood and Affect: Mood normal.         Behavior: Behavior normal.         Procedures           ED Course                                                       Medical Decision Making  Problems Addressed:  Closed fracture of head of left humerus, initial encounter: complicated acute illness or injury    Amount and/or Complexity of Data Reviewed  Radiology: ordered.    Risk  Prescription drug management.        Final diagnoses:   Closed fracture of head of left humerus, initial encounter       ED  Disposition  ED Disposition       ED Disposition   Discharge    Condition   Stable    Comment   --               Samantha Song PA  [address]  [place] [id number] [phone]    Call in 3 days      Carlyle Roche MD  [address]  [place] [address] [phone]    Call   if you do not have the information for your previous orthopedic surgeon for follow-up         Medication List      No changes were made to your prescriptions during this visit.         ED Course: 69-year-old female presenting for left shoulder pain.  Mechanical fall, no prodromal symptoms.  No blood thinner use, did not hit her head, neurovascularly intact.  Tenderness at the left shoulder with no gross deformities.  X-ray of the left shoulder showed fracture at the left humeral head, nondisplaced.  Will place patient in a sling, provided information for orthopedic surgery for follow-up.    Patient appropriate for discharge without further workup/ management from the Emergency Department. All questions were answered. Close return and follow-up instructions were provided, and pt was discharged home in stable condition.      EKG: None    Consults: None    Incidental Findings: None    Ideal body weight: 48.9 kg (107 lb 14.6 oz)  Adjusted ideal body weight: 59.9 kg (131 lb 15.2 oz)       Les Fink DO  05/12/25 6559

## 2025-05-14 ENCOUNTER — OFFICE VISIT (OUTPATIENT)
Dept: ORTHOPEDIC SURGERY | Facility: CLINIC | Age: 70
End: 2025-05-14
Payer: MEDICARE

## 2025-05-14 VITALS
HEART RATE: 85 BPM | HEIGHT: 62 IN | WEIGHT: 168 LBS | BODY MASS INDEX: 30.91 KG/M2 | SYSTOLIC BLOOD PRESSURE: 166 MMHG | DIASTOLIC BLOOD PRESSURE: 81 MMHG

## 2025-05-14 DIAGNOSIS — S42.295A OTHER CLOSED NONDISPLACED FRACTURE OF PROXIMAL END OF LEFT HUMERUS, INITIAL ENCOUNTER: Primary | ICD-10-CM

## 2025-05-14 NOTE — PROGRESS NOTES
Subjective:     Patient ID: Christiane Smith is a 69 y.o. female.    Chief Complaint:  Left shoulder pain, new patient  History of Present Illness  History of Present Illness  The patient presents to the clinic today for evaluation of left shoulder pain.    The pain began acutely after a fall on 05/12/2025 while playing pickleball. She tripped and landed with her left arm in an outstretched position, immediately experiencing pain in her left shoulder. She sought further evaluation at the emergency department where x-rays were taken. Due to concerns of a potential fracture in her left proximal humerus, she was placed in a sling. She reports that her pain has only minimally improved since the incident. She rates her current pain level as an 8 out of 10, describing it as moderate to severe in intensity, located over the lateral proximal arm and extending down to the midshaft of the left arm. The pain is characterized as stabbing and shooting, and is exacerbated by local pressure. She is right-hand dominant. She reports minimal improvement with ice, mild improvement with the use of a sling and anti-inflammatory medications. She does not experience any radiation of pain below the elbow, numbness, tingling, fevers, chills, or sweats.    SOCIAL HISTORY  Exercise: Plays pickle ball     Social History     Occupational History    Occupation: RETIRED   Tobacco Use    Smoking status: Never     Passive exposure: Never    Smokeless tobacco: Never   Vaping Use    Vaping status: Never Used   Substance and Sexual Activity    Alcohol use: No    Drug use: Never    Sexual activity: Defer      Past Medical History:   Diagnosis Date    Arthritis     Gastroenteritis due to norovirus 05/10/2019    GERD (gastroesophageal reflux disease)     Hyperlipidemia     Hypertension     Kidney stones     Migraine     Plantar fasciitis     Screening for malignant neoplasm of colon 01/31/2022     Past Surgical History:   Procedure Laterality Date     "ABLATION OF DYSRHYTHMIC FOCUS  [id number]    CHOLECYSTECTOMY      COLONOSCOPY N/A 06/03/2022    Procedure: COLONOSCOPY;  Surgeon: Dmitry Dela Cruz MD;  Location: [facility] MAIN OR;  Service: Gastroenterology;  Laterality: N/A;  diverticulosis, Hemorrhoids    CYSTOSCOPY      SKIN CANCER EXCISION  10/17/2024    nose    TOTAL HIP ARTHROPLASTY Left 03/14/2024    URETERAL STENT INSERTION         Family History   Problem Relation Age of Onset    Heart disease Mother     Heart attack Mother 74    Arthritis Father     Valvular heart disease Father 60    Hypertension Sister     Hyperlipidemia Sister     Cancer Maternal Aunt     Breast cancer Maternal Grandmother          Review of Systems        Objective:  Vitals:    05/14/25 1048   BP: 166/81   Pulse: 85   Weight: 76.2 kg (168 lb)   Height: 156.2 cm (61.5\")         05/14/25  1048   Weight: 76.2 kg (168 lb)     Body mass index is 31.23 kg/m².  Physical Exam    Vital signs reviewed.   General: No acute distress, alert and oriented  Eyes: conjunctiva clear; pupils equally round and reactive  ENT: external ears and nose atraumatic; oropharynx clear  CV: no peripheral edema  Resp: normal respiratory effort  Skin: no rashes or wounds; normal turgor  Psych: mood and affect appropriate; recent and remote memory intact          Physical Exam  - Integumentary: Moderate soft tissue swelling and mild ecchymosis noted along the proximal lateral arm over the region of the greater tuberosity extending down to the proximal shaft region of the humerus in the subdeltoid region  - Musculoskeletal:    - Left Shoulder: Moderate soft tissue swelling and focal tenderness to palpation along the proximal lateral arm over the region of the greater tuberosity extending down to the proximal shaft region of the humerus in the subdeltoid region. Mild ecchymosis noted. Tolerates passive forward flexion to 20 degrees, passive external rotation to neutral    - Left Elbow: Able to flex and extend left " elbow 0 to 120 degrees, 4 out of 5 strength    - Left Wrist: Able to flex and extend left wrist, 4+ out of 5 strength    - Left Hand/Wrist: Able to flex and extend all digits of left hand, MCP and IP joints, 4+ out of 5 strength. Brisk capillary refill in all digits    - Others: Positive sensation to light touch in all distributions, left arm, symmetric to the right. 2+ radial pulse         Imaging:  XR Humerus Left  Result Date: 5/12/2025  Impression: Nondisplaced fracture of the humeral head at the greater tuberosity. Electronically Signed: Adam Tai MD  5/12/2025 10:52 PM EDT  Workstation ID: [id number]    XR Shoulder 2+ View Left  Result Date: 5/12/2025  Impression: No radiographic evidence of an acute fracture or malalignment. Electronically Signed: Kendall Costello MD  5/12/2025 10:50 PM EDT  Workstation ID: [id number]    XR ELBOW 2 VIEW LEFT  Result Date: 5/12/2025  Impression: No radiographic evidence of an acute fracture or malalignment. Electronically Signed: Kendall Costello MD  5/12/2025 10:50 PM EDT  Workstation ID: [id number]       Independent review of outside imaging as well as independent interpretation of x-rays indicates nondisplaced fracture of the left greater tuberosity, glenohumeral alignment appears to be acceptable at this point in time, no evidence of significant humeral head elevation.    Assessment:        1. Other closed nondisplaced fracture of proximal end of left humerus, initial encounter           Plan:          Assessment & Plan  Left proximal humerus fracture:  Discussed treatment options at length with patient including not limited to observation, closed treatment, and surgical treatment with open reduction internal fixation of greater tuberosity fracture.  Given relatively acceptable alignment of fracture, patient was to proceed with conservative treatment with closed treatment without manipulation of left proximal humerus fracture.  Recommended continued use of the sling and  initiation of pendulum exercises in 1 week. Soft tissue massage and anti-inflammatory medications are advised as tolerated for pain control.    Follow-up  Follow up in 4 weeks with repeat x-rays of the left shoulder. If the fracture appears stable with signs of healing, then advance to formal therapy and discontinue sling use.    Christiane Smith was in agreement with plan and had all questions answered.     Orders:  No orders of the defined types were placed in this encounter.      Medications:  No orders of the defined types were placed in this encounter.      Followup:  No follow-ups on file.    Diagnoses and all orders for this visit:    1. Other closed nondisplaced fracture of proximal end of left humerus, initial encounter (Primary)          BMI is >= 30 and <35. (Class 1 Obesity). The following options were offered after discussion;: exercise counseling/recommendations       Dictated utilizing Dragon dictation     Patient or patient representative verbalized consent for the use of Ambient Listening during the visit with  Carlyle Roche MD for chart documentation. 5/27/2025  11:11 EDT

## 2025-05-15 ENCOUNTER — PATIENT ROUNDING (BHMG ONLY) (OUTPATIENT)
Dept: ORTHOPEDIC SURGERY | Facility: CLINIC | Age: 70
End: 2025-05-15
Payer: MEDICARE

## 2025-05-15 NOTE — PROGRESS NOTES
A My-Chart message has been sent to the patient for PATIENT ROUNDING with [facility].

## 2025-05-16 ENCOUNTER — TELEPHONE (OUTPATIENT)
Dept: ORTHOPEDIC SURGERY | Facility: CLINIC | Age: 70
End: 2025-05-16

## 2025-05-16 NOTE — TELEPHONE ENCOUNTER
Caller:     Relationship:     Best call back number:[phone] (home)       What is the best time to reach you: ANYTIME. ITS HER CELL NUMBER    Who are you requesting to speak with (clinical staff, provider,  specific staff member): BILLING ISSUE    Do you know the name of the person who called: INCOMING PATIENT CALL     What was the call regarding: PATIENT WAS SEEN WEDNESDAY 05/14/2025    PATIENT WAS GIVEN A SLING. PATIENT DID NOT GET A RECEIPT. SHE NEEDS TO KNOW THIS INFORMATION SO SHE CAN VERIFY IT AGAINST HER CHECKING ACCOUNT.       Is it okay if the provider responds through vogogohart:

## 2025-07-14 RX ORDER — FERROUS SULFATE 325(65) MG
TABLET ORAL
Qty: 90 TABLET | Refills: 1 | OUTPATIENT
Start: 2025-07-14

## (undated) DEVICE — JACKT LAB F/R KNIT CUFF/COLR XLG BLU

## (undated) DEVICE — Device

## (undated) DEVICE — CANN NASL CO2 TRULINK W/O2 A/

## (undated) DEVICE — KT ORCA ORCAPOD DISP STRL

## (undated) DEVICE — FLEX ADVANTAGE 1500CC: Brand: FLEX ADVANTAGE